# Patient Record
Sex: FEMALE | Race: WHITE | NOT HISPANIC OR LATINO | Employment: UNEMPLOYED | URBAN - METROPOLITAN AREA
[De-identification: names, ages, dates, MRNs, and addresses within clinical notes are randomized per-mention and may not be internally consistent; named-entity substitution may affect disease eponyms.]

---

## 2017-08-08 ENCOUNTER — OFFICE VISIT (OUTPATIENT)
Dept: MEDICAL GROUP | Facility: MEDICAL CENTER | Age: 37
End: 2017-08-08
Payer: COMMERCIAL

## 2017-08-08 VITALS
WEIGHT: 120 LBS | HEIGHT: 65 IN | DIASTOLIC BLOOD PRESSURE: 60 MMHG | HEART RATE: 65 BPM | BODY MASS INDEX: 19.99 KG/M2 | RESPIRATION RATE: 14 BRPM | SYSTOLIC BLOOD PRESSURE: 102 MMHG | TEMPERATURE: 99.8 F | OXYGEN SATURATION: 97 %

## 2017-08-08 DIAGNOSIS — M06.9 RHEUMATOID ARTHRITIS, INVOLVING UNSPECIFIED SITE, UNSPECIFIED RHEUMATOID FACTOR PRESENCE: ICD-10-CM

## 2017-08-08 DIAGNOSIS — Z00.00 PREVENTATIVE HEALTH CARE: ICD-10-CM

## 2017-08-08 PROCEDURE — 99203 OFFICE O/P NEW LOW 30 MIN: CPT | Performed by: PHYSICIAN ASSISTANT

## 2017-08-08 RX ORDER — SULFASALAZINE 500 MG/1
1500 TABLET ORAL 2 TIMES DAILY
Qty: 180 TAB | Refills: 2 | Status: SHIPPED | OUTPATIENT
Start: 2017-08-08 | End: 2017-10-03 | Stop reason: SDUPTHER

## 2017-08-08 ASSESSMENT — PATIENT HEALTH QUESTIONNAIRE - PHQ9: CLINICAL INTERPRETATION OF PHQ2 SCORE: 0

## 2017-08-08 NOTE — Clinical Note
[facility]  Kate Quinones PA-C  [address]  [place] [address]  Fax: [phone]   Authorization for Release/Disclosure of   Protected Health Information   Name: SUNSHINE MILLER : 1980 SSN: [id number]   Address: [facility] [place] [address] Phone:    [phone] (home)    I authorize the entity listed below to release/disclose the PHI below to:   [facility]/Kate Quinones PA-C and Fe Ortiz PA-C   Provider or Entity Name:     Address   City, State, Zip   Phone:      Fax:     Reason for request: continuity of care   Information to be released:    [  ] LAST COLONOSCOPY,  including any PATH REPORT and follow-up  [  ] LAST FIT/COLOGUARD RESULT [  ] LAST DEXA  [  ] LAST MAMMOGRAM  [  ] LAST PAP  [  ] LAST LABS [  ] RETINA EXAM REPORT  [  ] IMMUNIZATION RECORDS  [  ] Release all info      [  ] Check here and initial the line next to each item to release ALL health information INCLUDING  _____ Care and treatment for drug and / or alcohol abuse  _____ HIV testing, infection status, or AIDS  _____ Genetic Testing    DATES OF SERVICE OR TIME PERIOD TO BE DISCLOSED: _____________  I understand and acknowledge that:  * This Authorization may be revoked at any time by you in writing, except if your health information has already been used or disclosed.  * Your health information that will be used or disclosed as a result of you signing this authorization could be re-disclosed by the recipient. If this occurs, your re-disclosed health information may no longer be protected by State or Federal laws.  * You may refuse to sign this Authorization. Your refusal will not affect your ability to obtain treatment.  * This Authorization becomes effective upon signing and will  on (date) __________.      If no date is indicated, this Authorization will  one (1) year from the signature date.    Name: Sunshine Miller    Signature:   Date:     2017       PLEASE FAX REQUESTED RECORDS BACK TO: [phone]  [phone]

## 2017-08-08 NOTE — ASSESSMENT & PLAN NOTE
Pt has known RA, was under care of Dr. Malhotra. She currently takes Tocilizumab and Sulfasalazine. Last labs march 2017, last xrays last yr.   States RA is controlled on current meds,. Joints mostly affected R wrist, neck, collar bone.

## 2017-08-08 NOTE — Clinical Note
[facility]  Kate Quinones PA-C  [address]  [place] [address]  Fax: [phone]   Authorization for Release/Disclosure of   Protected Health Information   Name: SUNSHINE MILLER : 1980 SSN: [id number]   Address: [facility] [place] [address] Phone:    [phone] (home)    I authorize the entity listed below to release/disclose the PHI below to:   [facility]/Kate Quinones PA-C and Fe Ortiz PA-C   Provider or Entity Name:     Address   City, State, Zip   Phone:      Fax:     Reason for request: continuity of care   Information to be released:    [  ] LAST COLONOSCOPY,  including any PATH REPORT and follow-up  [  ] LAST FIT/COLOGUARD RESULT [  ] LAST DEXA  [  ] LAST MAMMOGRAM  [  ] LAST PAP  [  ] LAST LABS [  ] RETINA EXAM REPORT  [  ] IMMUNIZATION RECORDS  [  ] Release all info      [  ] Check here and initial the line next to each item to release ALL health information INCLUDING  _____ Care and treatment for drug and / or alcohol abuse  _____ HIV testing, infection status, or AIDS  _____ Genetic Testing    DATES OF SERVICE OR TIME PERIOD TO BE DISCLOSED: _____________  I understand and acknowledge that:  * This Authorization may be revoked at any time by you in writing, except if your health information has already been used or disclosed.  * Your health information that will be used or disclosed as a result of you signing this authorization could be re-disclosed by the recipient. If this occurs, your re-disclosed health information may no longer be protected by State or Federal laws.  * You may refuse to sign this Authorization. Your refusal will not affect your ability to obtain treatment.  * This Authorization becomes effective upon signing and will  on (date) __________.      If no date is indicated, this Authorization will  one (1) year from the signature date.    Name: Sunshine Miller    Signature:   Date:     2017       PLEASE FAX REQUESTED RECORDS BACK TO: [phone]  [phone]

## 2017-08-08 NOTE — MR AVS SNAPSHOT
"        Sunshine Durony   2017 1:40 PM   Office Visit   MRN: [id number]    Department:  [facility]   Dept Phone:  [phone]    Description:  Female : 1980   Provider:  Fe Ortiz PA-C           Reason for Visit     Establish Care Referrals (Rheumatologist) DX: RA      Allergies as of 2017     No Known Allergies      You were diagnosed with     Rheumatoid arthritis, involving unspecified site, unspecified rheumatoid factor presence (CMS-HCC)   [6221932]       Preventative health care   [172351]         Vital Signs     Blood Pressure Pulse Temperature Respirations Height Weight    102/60 mmHg 65 37.7 °C (99.8 °F) 14 1.651 m (5' 5\") 54.432 kg (120 lb)    Body Mass Index Oxygen Saturation Last Menstrual Period Smoking Status          19.97 kg/m2 97% 2017 Never Smoker         Basic Information     Date Of Birth Sex Race Ethnicity Preferred Language    1980 Female White Non- English      Problem List              ICD-10-CM Priority Class Noted - Resolved    Rheumatoid arthritis (CMS-HCC) M06.9   2014 - Present    Knee mass M25.869   2014 - Present    Hemorrhoid K64.9   2014 - Present      Health Maintenance        Date Due Completion Dates    PAP SMEAR 3/14/2016 3/14/2013 (Prv Comp)    Override on 3/14/2013: Previously completed (per patient, Dr. Sana Aguayo)    IMM INFLUENZA (1) 2017 10/21/2013    IMM DTaP/Tdap/Td Vaccine (2 - Td) 10/21/2023 10/21/2013            Current Immunizations     Influenza Vaccine Quad Inj (Pf) 10/21/2013  7:45 PM    MMR/Varicella Combined Vaccine  Incomplete    Tdap Vaccine  Incomplete, 10/21/2013  5:30 PM      Below and/or attached are the medications your provider expects you to take. Review all of your home medications and newly ordered medications with your provider and/or pharmacist. Follow medication instructions as directed by your provider and/or pharmacist. Please keep your medication list with you and share with " your provider. Update the information when medications are discontinued, doses are changed, or new medications (including over-the-counter products) are added; and carry medication information at all times in the event of emergency situations     Allergies:  No Known Allergies          Medications  Valid as of: August 08, 2017 -  1:59 PM    Generic Name Brand Name Tablet Size Instructions for use    Hydroxychloroquine Sulfate (Tab) PLAQUENIL 200 MG Take 200 mg by mouth every day.        Ibuprofen (Tab) MOTRIN 800 MG Take 1 Tab by mouth every 8 hours as needed for Mild Pain.        Norethindrone Acet-Ethinyl Est (Tab) Norethindrone Acet-Ethinyl Est 1.5-30 MG-MCG Take  by mouth.        Prenatal Vit-Fe Fumarate-FA (Tab) PRENATAL S 27-0.8 MG Take 1 Tab by mouth every morning.        SulfaSALAzine (Tab) AZULFIDINE 500 MG Take 3 Tabs by mouth 2 times a day. Take 3,  500 mg tabs 2 times daily        Tocilizumab (Solution Prefilled Syringe) Tocilizumab 162 MG/0.9ML Inject 1 Each as instructed every 7 days. Indications: Moderate to Severe Rheumatoid Arthritis        .                 Medicines prescribed today were sent to:     [facility] [place], [place] - [address] [facility]    [address] [facility] [place] [address]    Phone: [phone] Fax: [phone]    Open 24 Hours?: No    [facility] - [address]    [address] [facility] [address]    Phone: [phone] Fax: [phone]    Open 24 Hours?: No      Medication refill instructions:       If your prescription bottle indicates you have medication refills left, it is not necessary to call your provider’s office. Please contact your pharmacy and they will refill your medication.    If your prescription bottle indicates you do not have any refills left, you may request refills at any time through one of the following ways: The online [employer] system (except Urgent Care), by calling your provider’s office, or by  asking your pharmacy to contact your provider’s office with a refill request. Medication refills are processed only during regular business hours and may not be available until the next business day. Your provider may request additional information or to have a follow-up visit with you prior to refilling your medication.   *Please Note: Medication refills are assigned a new Rx number when refilled electronically. Your pharmacy may indicate that no refills were authorized even though a new prescription for the same medication is available at the pharmacy. Please request the medicine by name with the pharmacy before contacting your provider for a refill.        Your To Do List     Future Labs/Procedures Complete By Expires    CBC WITH DIFFERENTIAL  As directed 8/9/2018    COMP METABOLIC PANEL  As directed 8/9/2018    LIPID PROFILE  As directed 8/9/2018    TSH WITH REFLEX TO FT4  As directed 8/8/2018    VITAMIN D,25 HYDROXY  As directed 8/8/2018      Referral     A referral request has been sent to our patient care coordination department. Please allow 3-5 business days for us to process this request and contact you either by phone or mail. If you do not hear from us by the 5th business day, please call us at [phone].           [employer] Access Code: Activation code not generated  Current [employer] Status: Active

## 2017-08-30 ENCOUNTER — HOSPITAL ENCOUNTER (OUTPATIENT)
Dept: RADIOLOGY | Facility: MEDICAL CENTER | Age: 37
End: 2017-08-30
Attending: OBSTETRICS & GYNECOLOGY
Payer: COMMERCIAL

## 2017-08-30 DIAGNOSIS — R19.00 PELVIC MASS: ICD-10-CM

## 2017-08-30 PROCEDURE — 74177 CT ABD & PELVIS W/CONTRAST: CPT

## 2017-09-02 ENCOUNTER — HOSPITAL ENCOUNTER (OUTPATIENT)
Dept: LAB | Facility: MEDICAL CENTER | Age: 37
End: 2017-09-02
Attending: PHYSICIAN ASSISTANT
Payer: COMMERCIAL

## 2017-09-02 DIAGNOSIS — Z00.00 PREVENTATIVE HEALTH CARE: ICD-10-CM

## 2017-09-02 LAB
25(OH)D3 SERPL-MCNC: 32 NG/ML (ref 30–100)
ALBUMIN SERPL BCP-MCNC: 4.3 G/DL (ref 3.2–4.9)
ALBUMIN/GLOB SERPL: 2 G/DL
ALP SERPL-CCNC: 37 U/L (ref 30–99)
ALT SERPL-CCNC: 16 U/L (ref 2–50)
ANION GAP SERPL CALC-SCNC: 7 MMOL/L (ref 0–11.9)
AST SERPL-CCNC: 22 U/L (ref 12–45)
BASOPHILS # BLD AUTO: 1 % (ref 0–1.8)
BASOPHILS # BLD: 0.04 K/UL (ref 0–0.12)
BILIRUB SERPL-MCNC: 0.6 MG/DL (ref 0.1–1.5)
BUN SERPL-MCNC: 11 MG/DL (ref 8–22)
CALCIUM SERPL-MCNC: 9.3 MG/DL (ref 8.5–10.5)
CHLORIDE SERPL-SCNC: 106 MMOL/L (ref 96–112)
CHOLEST SERPL-MCNC: 172 MG/DL (ref 100–199)
CO2 SERPL-SCNC: 26 MMOL/L (ref 20–33)
CREAT SERPL-MCNC: 0.79 MG/DL (ref 0.5–1.4)
EOSINOPHIL # BLD AUTO: 0.09 K/UL (ref 0–0.51)
EOSINOPHIL NFR BLD: 2.3 % (ref 0–6.9)
ERYTHROCYTE [DISTWIDTH] IN BLOOD BY AUTOMATED COUNT: 45.6 FL (ref 35.9–50)
FASTING STATUS PATIENT QL REPORTED: NORMAL
GFR SERPL CREATININE-BSD FRML MDRD: >60 ML/MIN/1.73 M 2
GLOBULIN SER CALC-MCNC: 2.2 G/DL (ref 1.9–3.5)
GLUCOSE SERPL-MCNC: 82 MG/DL (ref 65–99)
HCT VFR BLD AUTO: 34.7 % (ref 37–47)
HDLC SERPL-MCNC: 95 MG/DL
HGB BLD-MCNC: 11.1 G/DL (ref 12–16)
IMM GRANULOCYTES # BLD AUTO: 0.01 K/UL (ref 0–0.11)
IMM GRANULOCYTES NFR BLD AUTO: 0.3 % (ref 0–0.9)
LDLC SERPL CALC-MCNC: 66 MG/DL
LYMPHOCYTES # BLD AUTO: 1.56 K/UL (ref 1–4.8)
LYMPHOCYTES NFR BLD: 39.2 % (ref 22–41)
MCH RBC QN AUTO: 30.4 PG (ref 27–33)
MCHC RBC AUTO-ENTMCNC: 32 G/DL (ref 33.6–35)
MCV RBC AUTO: 95.1 FL (ref 81.4–97.8)
MONOCYTES # BLD AUTO: 0.39 K/UL (ref 0–0.85)
MONOCYTES NFR BLD AUTO: 9.8 % (ref 0–13.4)
NEUTROPHILS # BLD AUTO: 1.89 K/UL (ref 2–7.15)
NEUTROPHILS NFR BLD: 47.4 % (ref 44–72)
NRBC # BLD AUTO: 0 K/UL
NRBC BLD AUTO-RTO: 0 /100 WBC
PLATELET # BLD AUTO: 252 K/UL (ref 164–446)
PMV BLD AUTO: 9.9 FL (ref 9–12.9)
POTASSIUM SERPL-SCNC: 4 MMOL/L (ref 3.6–5.5)
PROT SERPL-MCNC: 6.5 G/DL (ref 6–8.2)
RBC # BLD AUTO: 3.65 M/UL (ref 4.2–5.4)
SODIUM SERPL-SCNC: 139 MMOL/L (ref 135–145)
TRIGL SERPL-MCNC: 53 MG/DL (ref 0–149)
TSH SERPL DL<=0.005 MIU/L-ACNC: 1.32 UIU/ML (ref 0.3–3.7)
WBC # BLD AUTO: 4 K/UL (ref 4.8–10.8)

## 2017-09-02 PROCEDURE — 82306 VITAMIN D 25 HYDROXY: CPT

## 2017-09-02 PROCEDURE — 36415 COLL VENOUS BLD VENIPUNCTURE: CPT

## 2017-09-02 PROCEDURE — 85025 COMPLETE CBC W/AUTO DIFF WBC: CPT

## 2017-09-02 PROCEDURE — 80061 LIPID PANEL: CPT

## 2017-09-02 PROCEDURE — 84443 ASSAY THYROID STIM HORMONE: CPT

## 2017-09-02 PROCEDURE — 80053 COMPREHEN METABOLIC PANEL: CPT

## 2017-09-29 DIAGNOSIS — M06.9 RHEUMATOID ARTHRITIS, INVOLVING UNSPECIFIED SITE, UNSPECIFIED RHEUMATOID FACTOR PRESENCE: ICD-10-CM

## 2017-10-05 RX ORDER — TOCILIZUMAB 180 MG/ML
INJECTION, SOLUTION SUBCUTANEOUS
Qty: 3.6 ML | Refills: 1 | Status: SHIPPED | OUTPATIENT
Start: 2017-10-05 | End: 2017-10-25 | Stop reason: SDUPTHER

## 2017-10-05 RX ORDER — SULFASALAZINE 500 MG/1
1500 TABLET ORAL 2 TIMES DAILY
Qty: 180 TAB | Refills: 2 | Status: SHIPPED | OUTPATIENT
Start: 2017-10-05 | End: 2017-10-12 | Stop reason: SDUPTHER

## 2017-10-12 DIAGNOSIS — M06.9 RHEUMATOID ARTHRITIS, INVOLVING UNSPECIFIED SITE, UNSPECIFIED RHEUMATOID FACTOR PRESENCE: ICD-10-CM

## 2017-10-12 RX ORDER — SULFASALAZINE 500 MG/1
1500 TABLET ORAL 2 TIMES DAILY
Qty: 180 TAB | Refills: 2 | Status: SHIPPED | OUTPATIENT
Start: 2017-10-12 | End: 2017-12-19 | Stop reason: SDUPTHER

## 2017-10-25 DIAGNOSIS — M06.9 RHEUMATOID ARTHRITIS, INVOLVING UNSPECIFIED SITE, UNSPECIFIED RHEUMATOID FACTOR PRESENCE: ICD-10-CM

## 2017-12-19 DIAGNOSIS — M06.9 RHEUMATOID ARTHRITIS, INVOLVING UNSPECIFIED SITE, UNSPECIFIED RHEUMATOID FACTOR PRESENCE: ICD-10-CM

## 2017-12-20 RX ORDER — SULFASALAZINE 500 MG/1
1500 TABLET ORAL 2 TIMES DAILY
Qty: 180 TAB | Refills: 0 | Status: SHIPPED | OUTPATIENT
Start: 2017-12-20 | End: 2018-03-30 | Stop reason: SDUPTHER

## 2018-01-28 NOTE — PROGRESS NOTES
Chief Complaint- Rheumatoid arthritis      Sunshine Miller is a 37 y.o. female here as a new Rheumatology Consult referred by Fe Ortiz P.A.-C. for consultation regarding Rheumatoid arthritis    HPI:     Ms. Sunshine Miller presents with a history of rheumatoid arthritis followed by local Rheumatologist, Dr. Malhotra.    Our patient is CCP positive with Right wrist end stage erosive disease with joint space loss ands econdary OA and left IP thumb that has previous trauma.  SHe is RF negative and ANTHONY/SSA/SSB negative.      She was diagnosed with RA in [place].  She presented with  In 2006 she following a miscarriage she started to feel tightness in her neck.  When she became pregnancy again her symptoms resolved.  However following delivery she had recurrence of her symptoms.  She as finally diagnosed in 5/2009.  She did also have swelling in her feet, neck and thumb as well as her feet.  She also has had symptoms in her shoulders.  She denies sicca symptoms.  She has been told she has dry eyes.      When she moved to [place] she was followed by Denise Stewart then Brenna Malhotra    Overall lately she has been well maintained with actemra and sulfasalazine .  She has occasional pain in her wrists.        She has had intermittent rash since 2009 with appearance on her dorsum of the hands and feet that sometimes could turn into a fluid filled lesions and papules.  She manages this with clobetasol.     Previous Medication:  Never been on methotrexate  Tried plaquenil which did not do well  On sulfasalazine since day 1  Humira (didn't work and awful side effects)  remicade  Enbrel (first biologic and after pregnancy when she revisited this it did not work)  Orencia (did not work)  rituxan (did not work)      She notes that there is some mild morning stiffness lasting 10-20 minutes with's a few swollen joints and occasional stiff hands. She sometimes has hair loss (bernadine Humira) and sometimes has photosensitivity but  otherwise denies any eye inflammation or eye pain. She admits to a skin rash but also denies pleuritic chest pain. She admits to a nodes. She has chills and night sweats but no fevers. She is not fatigued as well as headaches as well as cavities and tinnitus. She denies any dizziness fainting or seizures or weakness. She denies any epistaxis or sinus trouble. She admits to night sweats. She denies any heart murmur or nausea or vomiting. She denies any diarrhea. She admits to constipation as well as blood in stool secondary to hemorrhoids. She denies any chest pain or irregular heart rates palpitations shortness of breath. She has regular menstrual periods and her last period was 12/28/2017. She admits to neck size but otherwise no swollen glands or thyroid problems. She drinks 3 glasses of alcohol a week.      Current Medications  tocilizumab 162 mg weekly Q Wednesday  Sulfasalazine 1500 mg BID      Pertinent serologies  Quantiferon negative 8/18/2016  HLA B26 negative  8/18/2016  Uric acid was 30 mg/dL  8/18/2016  SSA negative   8/18/2016  AAB negative 8/18/2016 lab lisa  Rheumatoid arthritis factor 13.7 (0-13.9) 8/18/2016        Results for CIERRA PEREZ (MRN [id number]) as of 1/27/2018 20:26   Ref. Range 1/25/2014 09:41   Hep B Surface Antibody Quant Latest Units: IU/L >1000.00   Hepatitis B Surface Antigen Latest Ref Range: Negative  Negative   Hepatitis B Ccore Ab, Total Latest Ref Range: Negative  Negative     Results for CIERRA PEREZ (MRN [id number]) as of 1/27/2018 20:26   Ref. Range 4/6/2013 11:51   Sjogren'S Anti-Ss-A Latest Ref Range: 0 - 40 AU/mL 1   Sjogren'S Anti-Ss-B Latest Ref Range: 0 - 40 AU/mL 0   Results for CIERRA PEREZ (MRN [id number]) as of 1/27/2018 20:26   Ref. Range 4/6/2013 11:51   Anti-Cariolipin Ab Igg Latest Ref Range: 0 - 14 GPL 3   Anti-Cardiolipin Ab Iga Latest Ref Range: 0 - 11 APL 4   Anti-Cardiolipin Ab Igm Latest Ref Range: 0 - 12 MPL 3   Beta-2 Glycoprotein I Ab Igg Latest Ref  Range: 0 - 20 SGU 7   Beta-2 Glycoprotein I Igm Latest Ref Range: 0 - 20 SMU 4       Single view of both hands 8/18/2016  Severe JSN, subchondral sclerosis and hypertrophic spurring of the radial carpal, intercarpal and carpal metacarpal joints of the right wrist.    Family history: Mother and grandmother had psoriasis, father had Lyme disease, mother had heart disease and colitis and high blood pressure.    Past medical history: Rheumatoid arthritis, lupus, knee lumpectomy.    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     • sulfaSALAzine (AZULFIDINE) 500 MG Tab Take 3 Tabs by mouth 2 times a day. Take 3,  500 mg tabs 2 times daily 180 Tab 0   • Tocilizumab (ACTEMRA) 162 MG/0.9ML Solution Prefilled Syringe Inject 1 Syringe as instructed every 7 days. 1 Syringe 0   • Norethindrone Acet-Ethinyl Est (JUNEL 1.5/30) 1.5-30 MG-MCG TABS Take  by mouth.     • hydroxychloroquine (PLAQUENIL) 200 MG TABS Take 200 mg by mouth every day.     • ibuprofen (MOTRIN) 800 MG TABS Take 1 Tab by mouth every 8 hours as needed for Mild Pain. 60 Tab 3   • prenatal vit/fe fumarate/fa (PRENATAL S) 27-0.8 MG TABS Take 1 Tab by mouth every morning.       No current facility-administered medications for this visit.      She  has a past medical history of Arthritis; Hemorrhoid (2/14/2014); Knee mass (2/14/2014); and Rheumatoid arthritis (CMS-HCC) (2/14/2014).  She  has a past surgical history that includes gyn surgery (2008); gyn surgery (2010); other; and repeat c section (10/21/2013).  Family History   Problem Relation Age of Onset   • Heart Disease Mother    • Hyperlipidemia Mother    • Hypertension Mother    • Cancer Maternal Aunt      esophageal CA, + HPV   • Hypertension Maternal Aunt    • Heart Disease  "Maternal Grandmother    • Heart Attack Maternal Grandmother 77   • Clotting Disorder Maternal Grandmother      DVT   • Cancer Maternal Grandmother      HPV   • Diabetes Maternal Grandfather    • Cancer Maternal Grandfather      Leukemia   • Cancer Paternal Grandmother      breast CA   • Rheumatologic Disease Paternal Grandmother      RA, SLE   • Rheumatologic Disease Other      RA     Family Status   Relation Status   • Mother Alive   • Father Alive   • Sister Alive   • Maternal Aunt Alive   • Maternal Grandmother Alive   • Maternal Grandfather Alive   • Paternal Grandmother Alive   • Paternal Grandfather     navy -- plane shot down   • Other Alive     Social History   Substance Use Topics   • Smoking status: Never Smoker   • Smokeless tobacco: Never Used   • Alcohol use 2.4 oz/week     3 Glasses of wine, 1 Cans of beer per week     Social History     Social History Narrative   • No narrative on file         ROS  Constitutional ROS: chronic night sweats  Eye ROS: No recent significant change in vision  Ear ROS: No ear pain  Mouth/Throat ROS: No recent change in voice or hoarseness  Neck ROS: No lumps or masses  Pulmonary ROS: No dyspnea on exertion, No wheezing, No shortness of breath  Cardiovascular ROS: No chest pain, No shortness of breath, No dyspnea on exertion  Gastrointestinal ROS: No abdominal pain  Musculoskeletal/Extremities ROS: No clubbing, occasional problems with right wrist  Hematologic/Lymphatic ROS: No coagulation disorder  Skin/Integumentary ROS: intermittent  Neurologic ROS: Normal development, No chronic headaches       Objective:     Blood pressure (!) 98/56, pulse 68, temperature 37.6 °C (99.6 °F), resp. rate 14, height 1.651 m (5' 5\"), weight 54.9 kg (121 lb), SpO2 96 %. Body mass index is 20.14 kg/m².  Physical Exam:    Vitals:    18 1048   BP: (!) 98/56   Pulse: 68   Resp: 14   Temp: 37.6 °C (99.6 °F)   SpO2: 96%   Weight: 54.9 kg (121 lb)   Height: 1.651 m (5' 5\")    Body " mass index is 20.14 kg/m².    General/Constitutional: NAD not diaphoretic, comfortable  Eyes: clear conjunctiva, no scleral icterus, EOMI  Ears, Nose, Mouth,Throat: no oral ulcers, good dentition, moist mucous membranes, no discharge from ears bilaterally  Cardiovascular: regular rate and rhythm.  No murmurs, gallops, rubs  Respiratory: normal effort, unlabored respiration.  On auscultation no wheezes, rales, rhonchi.  Clear to auscultation.  GI: soft, NTTP no hepatosplenomegaly, nondistended  Musculoskeletal  Axial:  Cervical:extension 60 degrees flexion 45 degrees rotation to right and left 60 degrees  Thoracic: no kypshosis  Upper Extremities:  No synovitis of the PIP, DIP, MCP however right wrist has severely limited ROM and fused  Elbows have good ROM bilateral.  Left wrist of good ROM bilateral    Lower Extremities:  No knee effusion bilateral, No crepitus bilateral  No MTP tenderness bilateral  Gait is normal  Skin: Limited skin exam.  no rashes, no digital ulcerations, no alopecia, no tophi, no skin thickening, no nodules  Neuro: CN II-XII grossly intact, Alert, Oriented x 3, moves all four extremities  Psych: normal affect, normal mood, judgement appropriate, follows commands, responses are appropritae  Heme/Lymph: no cervical adenopathy        Lab Results   Component Value Date/Time    QNTTBGOLD Comment 01/25/2014 09:41 AM     Lab Results   Component Value Date/Time    HEPBCORTOT Negative 01/25/2014 09:41 AM    HEPBSAG Negative 01/25/2014 09:41 AM     No results found for: HEPCAB  Lab Results   Component Value Date/Time    SODIUM 139 09/02/2017 10:24 AM    POTASSIUM 4.0 09/02/2017 10:24 AM    CHLORIDE 106 09/02/2017 10:24 AM    CO2 26 09/02/2017 10:24 AM    GLUCOSE 82 09/02/2017 10:24 AM    BUN 11 09/02/2017 10:24 AM    CREATININE 0.79 09/02/2017 10:24 AM      Lab Results   Component Value Date/Time    WBC 4.0 (L) 09/02/2017 10:24 AM    RBC 3.65 (L) 09/02/2017 10:24 AM    HEMOGLOBIN 11.1 (L) 09/02/2017  10:24 AM    HEMATOCRIT 34.7 (L) 09/02/2017 10:24 AM    MCV 95.1 09/02/2017 10:24 AM    MCH 30.4 09/02/2017 10:24 AM    MCHC 32.0 (L) 09/02/2017 10:24 AM    MPV 9.9 09/02/2017 10:24 AM    NEUTSPOLYS 47.40 09/02/2017 10:24 AM    LYMPHOCYTES 39.20 09/02/2017 10:24 AM    MONOCYTES 9.80 09/02/2017 10:24 AM    EOSINOPHILS 2.30 09/02/2017 10:24 AM    BASOPHILS 1.00 09/02/2017 10:24 AM    HYPOCHROMIA 1+ 01/25/2014 09:41 AM      Lab Results   Component Value Date/Time    CALCIUM 9.3 09/02/2017 10:24 AM    ASTSGOT 22 09/02/2017 10:24 AM    ALTSGPT 16 09/02/2017 10:24 AM    ALKPHOSPHAT 37 09/02/2017 10:24 AM    TBILIRUBIN 0.6 09/02/2017 10:24 AM    ALBUMIN 4.3 09/02/2017 10:24 AM    TOTPROTEIN 6.5 09/02/2017 10:24 AM     No results found for: URICACID, RHEUMFACTN, CCPANTIBODY, ANTINUCAB  Lab Results   Component Value Date/Time    SEDRATEWES 2 11/13/2014 08:59 AM    CREACTPROT 1.02 (H) 04/06/2013 11:51 AM     Lab Results   Component Value Date/Time    RUSSELVIPER 35.3 04/06/2013 11:51 AM    DRVVTINTERP Not Present 04/06/2013 11:51 AM    DRVVTINTERP Negative 04/06/2013 11:51 AM     Lab Results   Component Value Date/Time    IGGCARDIOLI 3 04/06/2013 11:51 AM    IGMCARDIOLI 3 04/06/2013 11:51 AM    IGACARDIOLI 4 04/06/2013 11:51 AM     No results found for: ANADIRECT, ANTIDNADS, RNPAB, SMITHAB, RZLTUIT13, SSAROAB, SSBLAAB, QVXYNW7RJ, CENTROMBAB  Lab Results   Component Value Date/Time    ANTISSASJ 1 04/06/2013 11:51 AM    ANTISSBSJ 0 04/06/2013 11:51 AM     No results found for: COLORURINE, SPECGRAVITY, PHURINE, GLUCOSEUR, KETONES, PROTEINURIN  No results found for: TOTPROTUR, TOTALVOLUME, [id number]  No results found for: SSA60, SSA52  No results found for: HBA1C  No results found for: CPKTOTAL  No results found for: G6PD  No results found for: [id number]  No results found for: ACESERUM  Lab Results   Component Value Date/Time    25HYDROXY 32 09/02/2017 10:24 AM     No results found for: TSH, FREEDIR  Lab Results   Component  Value Date/Time    EMY 1.320 09/02/2017 10:24 AM     No results found for: MICROSOMALA, ANTITHYROGL  No results found for: IGGLYMEABS  No results found for: ANTIMITOCHO, FACTIN  No results found for: IGA, TTRANSIGA, ENDOIGA  No results found for: FLTYPE, CRYSTALSBDF  No results found for: ISTATICAL  No results found for: ISTATCREAT  No results found for: CTELOPEP  No results found for: GBMABG  No results found for: PTHINTACT           Results for orders placed in visit on 12/12/13   MR-KNEE-W/O    Impression Popliteus myotendinous junction cystic change with edema indicating tendinopathy. A discrete tear is not identified    Large knee joint effusion    Shallow patellar apex cartilage fissuring without full thickness defect, flap or thinning identified    Medial proximal leg superficial mass which had complex cystic characteristics on comparison ultrasound. No significant change in size or morphology is identified given differences in technique. This may represent posttraumatic change, prior hematoma that has not resolved. Myxoid variant soft tissue sarcoma/neoplasm cannot be excluded and if indicated enhanced MRI may prove useful to clarify            Assessment and Plan:    CCP positive rheumatoid arthritis with end stage disease in the right wrist.    She has been well maintained on Actemra 162 mg weekly and sulfasalazine 1500 mg BID.  She has been stable.  Today she inquires about decreasing her medication load. I indicated that I would first like to evaluate and monitor her. If she remains stable we could decrease the sulfasalazine 1000 mg BID.  Alternatively we could also change the Actemra to 162 mg q other week as our second option.    Rash  Will monitor  Currently quiescent  Managed with topicals        Encounter high risk medication  Check lipid panel in March 2, 2018   Check CBC, CMP also in March 2018  Today we will get labs as well and hepatitis C and quantiferon      1. Cyclic citrullinated  peptide (CCP) antibody positive  Probiotic Product (PROBIOTIC DAILY PO)    HEP C VIRUS ANTIBODY    CBC WITH DIFFERENTIAL    COMP METABOLIC PANEL    WESTERGREN SED RATE    CRP QUANTITIVE (NON-CARDIAC)    ANTI-DNA (DS)    ANTHONY ANTIBODY WITH REFLEX   2. Encounter for long-term (current) use of high-risk medication  Probiotic Product (PROBIOTIC DAILY PO)    HEP C VIRUS ANTIBODY    CBC WITH DIFFERENTIAL    COMP METABOLIC PANEL    WESTERGREN SED RATE    CRP QUANTITIVE (NON-CARDIAC)    ANTI-DNA (DS)    ANTHONY ANTIBODY WITH REFLEX    TB TEST CELL IMM MEASURE AG (QUANTIFERON)   3. Rash  Probiotic Product (PROBIOTIC DAILY PO)    HEP C VIRUS ANTIBODY   4. Rheumatoid arthritis of multiple sites with negative rheumatoid factor (CMS-HCC)  Probiotic Product (PROBIOTIC DAILY PO)    HEP C VIRUS ANTIBODY    CBC WITH DIFFERENTIAL    COMP METABOLIC PANEL    WESTERGREN SED RATE    CRP QUANTITIVE (NON-CARDIAC)    ANTI-DNA (DS)    ANTHONY ANTIBODY WITH REFLEX     Return in about 3 months (around 4/30/2018).      Records requested.  Followup: Return in about 3 months (around 4/30/2018). or sooner prn  Patient was seen 60 minutes face-to-face (excluding time for procedures)  of which more than 50% the time was spent counseling the patient regarding  rheumatological conditions and care. Therapy was discussed in detail.  Thank you for this referral.

## 2018-01-31 ENCOUNTER — OFFICE VISIT (OUTPATIENT)
Dept: RHEUMATOLOGY | Facility: PHYSICIAN GROUP | Age: 38
End: 2018-01-31
Payer: COMMERCIAL

## 2018-01-31 VITALS
WEIGHT: 121 LBS | BODY MASS INDEX: 20.16 KG/M2 | TEMPERATURE: 99.6 F | DIASTOLIC BLOOD PRESSURE: 56 MMHG | RESPIRATION RATE: 14 BRPM | HEART RATE: 68 BPM | OXYGEN SATURATION: 96 % | HEIGHT: 65 IN | SYSTOLIC BLOOD PRESSURE: 98 MMHG

## 2018-01-31 DIAGNOSIS — R21 RASH: ICD-10-CM

## 2018-01-31 DIAGNOSIS — M06.09 RHEUMATOID ARTHRITIS OF MULTIPLE SITES WITH NEGATIVE RHEUMATOID FACTOR (HCC): ICD-10-CM

## 2018-01-31 DIAGNOSIS — R76.8 CYCLIC CITRULLINATED PEPTIDE (CCP) ANTIBODY POSITIVE: ICD-10-CM

## 2018-01-31 DIAGNOSIS — Z79.899 ENCOUNTER FOR LONG-TERM (CURRENT) USE OF HIGH-RISK MEDICATION: ICD-10-CM

## 2018-01-31 PROCEDURE — 99244 OFF/OP CNSLTJ NEW/EST MOD 40: CPT | Performed by: INTERNAL MEDICINE

## 2018-01-31 NOTE — LETTER
[facility]   [address], [address]  [place] [address]  Phone: [phone]  Fax: [phone]              Encounter Date: 1/31/2018    Dear Dr. Guy ref. provider found,    It was a pleasure seeing your patient, Sunshine Miller, on 1/31/2018. Diagnoses of Cyclic citrullinated peptide (CCP) antibody positive, Encounter for long-term (current) use of high-risk medication, Rash, and Rheumatoid arthritis of multiple sites with negative rheumatoid factor (CMS-[facility]) were pertinent to this visit.     Please find attached progress note which includes the history I obtained from Ms. Miller, my physical examination findings, my impression and recommendations.      Once again, it was a pleasure participating in your patient's care.  Please feel free to contact me if you have any questions or if I can be of any further assistance to your patients.      Sincerely,    Linda [place] M.D.  Electronically Signed          PROGRESS NOTE:  Chief Complaint- Rheumatoid arthritis      Sunshine Miller is a 37 y.o. female here as a new Rheumatology Consult referred by Fe Ortiz P.A.-C. for consultation regarding Rheumatoid arthritis    HPI:     Ms. Sunshine Miller presents with a history of rheumatoid arthritis followed by local Rheumatologist, Dr. Malhotra.    Our patient is CCP positive with Right wrist end stage erosive disease with joint space loss ands econdary OA and left IP thumb that has previous trauma.  SHe is RF negative and ANTHONY/SSA/SSB negative.      She was diagnosed with RA in [place].  She presented with  In 2006 she following a miscarriage she started to feel tightness in her neck.  When she became pregnancy again her symptoms resolved.  However following delivery she had recurrence of her symptoms.  She as finally diagnosed in 5/2009.  She did also have swelling in her feet, neck and thumb as well as her feet.  She also has had symptoms in her shoulders.  She denies sicca symptoms.  She has  been told she has dry eyes.      When she moved to [place] she was followed by Denise Stewart then Brenna Malhotra    Overall lately she has been well maintained with actemra and sulfasalazine .  She has occasional pain in her wrists.        She has had intermittent rash since 2009 with appearance on her dorsum of the hands and feet that sometimes could turn into a fluid filled lesions and papules.  She manages this with clobetasol.     Previous Medication:  Never been on methotrexate  Tried plaquenil which did not do well  On sulfasalazine since day 1  Humira (didn't work and awful side effects)  remicade  Enbrel (first biologic and after pregnancy when she revisited this it did not work)  Orencia (did not work)  rituxan (did not work)      She notes that there is some mild morning stiffness lasting 10-20 minutes with's a few swollen joints and occasional stiff hands. She sometimes has hair loss (bernadine Humira) and sometimes has photosensitivity but otherwise denies any eye inflammation or eye pain. She admits to a skin rash but also denies pleuritic chest pain. She admits to a nodes. She has chills and night sweats but no fevers. She is not fatigued as well as headaches as well as cavities and tinnitus. She denies any dizziness fainting or seizures or weakness. She denies any epistaxis or sinus trouble. She admits to night sweats. She denies any heart murmur or nausea or vomiting. She denies any diarrhea. She admits to constipation as well as blood in stool secondary to hemorrhoids. She denies any chest pain or irregular heart rates palpitations shortness of breath. She has regular menstrual periods and her last period was 12/28/2017. She admits to neck size but otherwise no swollen glands or thyroid problems. She drinks 3 glasses of alcohol a week.      Current Medications  tocilizumab 162 mg weekly Q Wednesday  Sulfasalazine 1500 mg BID      Pertinent serologies  Quantiferon negative 8/18/2016  HLA B26 negative   8/18/2016  Uric acid was 30 mg/dL  8/18/2016  SSA negative   8/18/2016  AAB negative 8/18/2016 lab lisa  Rheumatoid arthritis factor 13.7 (0-13.9) 8/18/2016        Results for CIERRA PEREZ (MRN [id number]) as of 1/27/2018 20:26   Ref. Range 1/25/2014 09:41   Hep B Surface Antibody Quant Latest Units: IU/L >1000.00   Hepatitis B Surface Antigen Latest Ref Range: Negative  Negative   Hepatitis B Ccore Ab, Total Latest Ref Range: Negative  Negative     Results for CIERRA PEREZ (MRN [id number]) as of 1/27/2018 20:26   Ref. Range 4/6/2013 11:51   Sjogren'S Anti-Ss-A Latest Ref Range: 0 - 40 AU/mL 1   Sjogren'S Anti-Ss-B Latest Ref Range: 0 - 40 AU/mL 0   Results for CIERRA PEREZ (MRN [id number]) as of 1/27/2018 20:26   Ref. Range 4/6/2013 11:51   Anti-Cariolipin Ab Igg Latest Ref Range: 0 - 14 GPL 3   Anti-Cardiolipin Ab Iga Latest Ref Range: 0 - 11 APL 4   Anti-Cardiolipin Ab Igm Latest Ref Range: 0 - 12 MPL 3   Beta-2 Glycoprotein I Ab Igg Latest Ref Range: 0 - 20 SGU 7   Beta-2 Glycoprotein I Igm Latest Ref Range: 0 - 20 SMU 4       Single view of both hands 8/18/2016  Severe JSN, subchondral sclerosis and hypertrophic spurring of the radial carpal, intercarpal and carpal metacarpal joints of the right wrist.    Family history: Mother and grandmother had psoriasis, father had Lyme disease, mother had heart disease and colitis and high blood pressure.    Past medical history: Rheumatoid arthritis, lupus, knee lumpectomy.    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     • sulfaSALAzine (AZULFIDINE) 500 MG Tab Take 3 Tabs by mouth 2 times a day. Take 3,  500 mg tabs 2 times daily 180 Tab 0   • Tocilizumab (ACTEMRA) 162 MG/0.9ML Solution Prefilled Syringe Inject 1 Syringe as instructed every 7  days. 1 Syringe 0   • Norethindrone Acet-Ethinyl Est (JUNEL ) 1.5-30 MG-MCG TABS Take  by mouth.     • hydroxychloroquine (PLAQUENIL) 200 MG TABS Take 200 mg by mouth every day.     • ibuprofen (MOTRIN) 800 MG TABS Take 1 Tab by mouth every 8 hours as needed for Mild Pain. 60 Tab 3   • prenatal vit/fe fumarate/fa (PRENATAL S) 27-0.8 MG TABS Take 1 Tab by mouth every morning.       No current facility-administered medications for this visit.      She  has a past medical history of Arthritis; Hemorrhoid (2014); Knee mass (2014); and Rheumatoid arthritis (CMS-HCC) (2014).  She  has a past surgical history that includes gyn surgery (); gyn surgery (); other; and repeat c section (10/21/2013).  Family History   Problem Relation Age of Onset   • Heart Disease Mother    • Hyperlipidemia Mother    • Hypertension Mother    • Cancer Maternal Aunt      esophageal CA, + HPV   • Hypertension Maternal Aunt    • Heart Disease Maternal Grandmother    • Heart Attack Maternal Grandmother 77   • Clotting Disorder Maternal Grandmother      DVT   • Cancer Maternal Grandmother      HPV   • Diabetes Maternal Grandfather    • Cancer Maternal Grandfather      Leukemia   • Cancer Paternal Grandmother      breast CA   • Rheumatologic Disease Paternal Grandmother      RA, SLE   • Rheumatologic Disease Other      RA     Family Status   Relation Status   • Mother Alive   • Father Alive   • Sister Alive   • Maternal Aunt Alive   • Maternal Grandmother Alive   • Maternal Grandfather Alive   • Paternal Grandmother Alive   • Paternal Grandfather     navy -- plane shot down   • Other Alive     Social History   Substance Use Topics   • Smoking status: Never Smoker   • Smokeless tobacco: Never Used   • Alcohol use 2.4 oz/week     3 Glasses of wine, 1 Cans of beer per week     Social History     Social History Narrative   • No narrative on file         ROS  Constitutional ROS: chronic night sweats  Eye ROS: No  "recent significant change in vision  Ear ROS: No ear pain  Mouth/Throat ROS: No recent change in voice or hoarseness  Neck ROS: No lumps or masses  Pulmonary ROS: No dyspnea on exertion, No wheezing, No shortness of breath  Cardiovascular ROS: No chest pain, No shortness of breath, No dyspnea on exertion  Gastrointestinal ROS: No abdominal pain  Musculoskeletal/Extremities ROS: No clubbing, occasional problems with right wrist  Hematologic/Lymphatic ROS: No coagulation disorder  Skin/Integumentary ROS: intermittent  Neurologic ROS: Normal development, No chronic headaches       Objective:     Blood pressure (!) 98/56, pulse 68, temperature 37.6 °C (99.6 °F), resp. rate 14, height 1.651 m (5' 5\"), weight 54.9 kg (121 lb), SpO2 96 %. Body mass index is 20.14 kg/m².  Physical Exam:    Vitals:    01/31/18 1048   BP: (!) 98/56   Pulse: 68   Resp: 14   Temp: 37.6 °C (99.6 °F)   SpO2: 96%   Weight: 54.9 kg (121 lb)   Height: 1.651 m (5' 5\")    Body mass index is 20.14 kg/m².    General/Constitutional: NAD not diaphoretic, comfortable  Eyes: clear conjunctiva, no scleral icterus, EOMI  Ears, Nose, Mouth,Throat: no oral ulcers, good dentition, moist mucous membranes, no discharge from ears bilaterally  Cardiovascular: regular rate and rhythm.  No murmurs, gallops, rubs  Respiratory: normal effort, unlabored respiration.  On auscultation no wheezes, rales, rhonchi.  Clear to auscultation.  GI: soft, NTTP no hepatosplenomegaly, nondistended  Musculoskeletal  Axial:  Cervical:extension 60 degrees flexion 45 degrees rotation to right and left 60 degrees  Thoracic: no kypshosis  Upper Extremities:  No synovitis of the PIP, DIP, MCP however right wrist has severely limited ROM and fused  Elbows have good ROM bilateral.  Left wrist of good ROM bilateral    Lower Extremities:  No knee effusion bilateral, No crepitus bilateral  No MTP tenderness bilateral  Gait is normal  Skin: Limited skin exam.  no rashes, no digital ulcerations, " no alopecia, no tophi, no skin thickening, no nodules  Neuro: CN II-XII grossly intact, Alert, Oriented x 3, moves all four extremities  Psych: normal affect, normal mood, judgement appropriate, follows commands, responses are appropritae  Heme/Lymph: no cervical adenopathy        Lab Results   Component Value Date/Time    QNTTBGOLD Comment 01/25/2014 09:41 AM     Lab Results   Component Value Date/Time    HEPBCORTOT Negative 01/25/2014 09:41 AM    HEPBSAG Negative 01/25/2014 09:41 AM     No results found for: HEPCAB  Lab Results   Component Value Date/Time    SODIUM 139 09/02/2017 10:24 AM    POTASSIUM 4.0 09/02/2017 10:24 AM    CHLORIDE 106 09/02/2017 10:24 AM    CO2 26 09/02/2017 10:24 AM    GLUCOSE 82 09/02/2017 10:24 AM    BUN 11 09/02/2017 10:24 AM    CREATININE 0.79 09/02/2017 10:24 AM      Lab Results   Component Value Date/Time    WBC 4.0 (L) 09/02/2017 10:24 AM    RBC 3.65 (L) 09/02/2017 10:24 AM    HEMOGLOBIN 11.1 (L) 09/02/2017 10:24 AM    HEMATOCRIT 34.7 (L) 09/02/2017 10:24 AM    MCV 95.1 09/02/2017 10:24 AM    MCH 30.4 09/02/2017 10:24 AM    MCHC 32.0 (L) 09/02/2017 10:24 AM    MPV 9.9 09/02/2017 10:24 AM    NEUTSPOLYS 47.40 09/02/2017 10:24 AM    LYMPHOCYTES 39.20 09/02/2017 10:24 AM    MONOCYTES 9.80 09/02/2017 10:24 AM    EOSINOPHILS 2.30 09/02/2017 10:24 AM    BASOPHILS 1.00 09/02/2017 10:24 AM    HYPOCHROMIA 1+ 01/25/2014 09:41 AM      Lab Results   Component Value Date/Time    CALCIUM 9.3 09/02/2017 10:24 AM    ASTSGOT 22 09/02/2017 10:24 AM    ALTSGPT 16 09/02/2017 10:24 AM    ALKPHOSPHAT 37 09/02/2017 10:24 AM    TBILIRUBIN 0.6 09/02/2017 10:24 AM    ALBUMIN 4.3 09/02/2017 10:24 AM    TOTPROTEIN 6.5 09/02/2017 10:24 AM     No results found for: URICACID, RHEUMFACTN, CCPANTIBODY, ANTINUCAB  Lab Results   Component Value Date/Time    SEDRATEWES 2 11/13/2014 08:59 AM    CREACTPROT 1.02 (H) 04/06/2013 11:51 AM     Lab Results   Component Value Date/Time    VIVIAN 35.3 04/06/2013 11:51 AM     DRVVTINTERP Not Present 04/06/2013 11:51 AM    DRVVTINTERP Negative 04/06/2013 11:51 AM     Lab Results   Component Value Date/Time    IGGCARDIOLI 3 04/06/2013 11:51 AM    IGMCARDIOLI 3 04/06/2013 11:51 AM    IGACARDIOLI 4 04/06/2013 11:51 AM     No results found for: ANADIRECT, ANTIDNADS, RNPAB, SMITHAB, VQKCVMW35, SSAROAB, SSBLAAB, ZPPFXH9LU, CENTROMBAB  Lab Results   Component Value Date/Time    ANTISSASJ 1 04/06/2013 11:51 AM    ANTISSBSJ 0 04/06/2013 11:51 AM     No results found for: COLORURINE, SPECGRAVITY, PHURINE, GLUCOSEUR, KETONES, PROTEINURIN  No results found for: TOTPROTUR, TOTALVOLUME, IYSQUQCD18  No results found for: SSA60, SSA52  No results found for: HBA1C  No results found for: CPKTOTAL  No results found for: G6PD  No results found for: [id number]  No results found for: ACESERUM  Lab Results   Component Value Date/Time    25HYDROXY 32 09/02/2017 10:24 AM     No results found for: TSH, FREEDIR  Lab Results   Component Value Date/Time    TSHULTRASEN 1.320 09/02/2017 10:24 AM     No results found for: MICROSOMALA, ANTITHYROGL  No results found for: IGGLYMEABS  No results found for: ANTIMITOCHO, FACTIN  No results found for: IGA, TTRANSIGA, ENDOIGA  No results found for: FLTYPE, CRYSTALSBDF  No results found for: ISTATICAL  No results found for: ISTATCREAT  No results found for: CTELOPEP  No results found for: GBMABG  No results found for: PTHINTACT           Results for orders placed in visit on 12/12/13   MR-KNEE-W/O    Impression Popliteus myotendinous junction cystic change with edema indicating tendinopathy. A discrete tear is not identified    Large knee joint effusion    Shallow patellar apex cartilage fissuring without full thickness defect, flap or thinning identified    Medial proximal leg superficial mass which had complex cystic characteristics on comparison ultrasound. No significant change in size or morphology is identified given differences in technique. This may represent  posttraumatic change, prior hematoma that has not resolved. Myxoid variant soft tissue sarcoma/neoplasm cannot be excluded and if indicated enhanced MRI may prove useful to clarify            Assessment and Plan:    CCP positive rheumatoid arthritis with end stage disease in the right wrist.    She has been well maintained on Actemra 162 mg weekly and sulfasalazine 1500 mg BID.  She has been stable.  Today she inquires about decreasing her medication load. I indicated that I would first like to evaluate and monitor her. If she remains stable we could decrease the sulfasalazine 1000 mg BID.  Alternatively we could also change the Actemra to 162 mg q other week as our second option.    Rash  Will monitor  Currently quiescent  Managed with topicals        Encounter high risk medication  Check lipid panel in March 2, 2018   Check CBC, CMP also in March 2018  Today we will get labs as well and hepatitis C and quantiferon      1. Cyclic citrullinated peptide (CCP) antibody positive  Probiotic Product (PROBIOTIC DAILY PO)    HEP C VIRUS ANTIBODY    CBC WITH DIFFERENTIAL    COMP METABOLIC PANEL    WESTERGREN SED RATE    CRP QUANTITIVE (NON-CARDIAC)    ANTI-DNA (DS)    ANTHONY ANTIBODY WITH REFLEX   2. Encounter for long-term (current) use of high-risk medication  Probiotic Product (PROBIOTIC DAILY PO)    HEP C VIRUS ANTIBODY    CBC WITH DIFFERENTIAL    COMP METABOLIC PANEL    WESTERGREN SED RATE    CRP QUANTITIVE (NON-CARDIAC)    ANTI-DNA (DS)    ANTHONY ANTIBODY WITH REFLEX    TB TEST CELL IMM MEASURE AG (QUANTIFERON)   3. Rash  Probiotic Product (PROBIOTIC DAILY PO)    HEP C VIRUS ANTIBODY   4. Rheumatoid arthritis of multiple sites with negative rheumatoid factor (CMS-[facility])  Probiotic Product (PROBIOTIC DAILY PO)    HEP C VIRUS ANTIBODY    CBC WITH DIFFERENTIAL    COMP METABOLIC PANEL    WESTERGREN SED RATE    CRP QUANTITIVE (NON-CARDIAC)    ANTI-DNA (DS)    ANTHONY ANTIBODY WITH REFLEX     Return in about 3 months (around  4/30/2018).      Records requested.  Followup: Return in about 3 months (around 4/30/2018). or sooner prn  Patient was seen 60 minutes face-to-face (excluding time for procedures)  of which more than 50% the time was spent counseling the patient regarding  rheumatological conditions and care. Therapy was discussed in detail.  Thank you for this referral.

## 2018-02-05 ENCOUNTER — HOSPITAL ENCOUNTER (OUTPATIENT)
Dept: LAB | Facility: MEDICAL CENTER | Age: 38
End: 2018-02-05
Attending: INTERNAL MEDICINE
Payer: COMMERCIAL

## 2018-02-05 DIAGNOSIS — M06.09 RHEUMATOID ARTHRITIS OF MULTIPLE SITES WITH NEGATIVE RHEUMATOID FACTOR (HCC): ICD-10-CM

## 2018-02-05 DIAGNOSIS — R76.8 CYCLIC CITRULLINATED PEPTIDE (CCP) ANTIBODY POSITIVE: ICD-10-CM

## 2018-02-05 DIAGNOSIS — Z79.899 ENCOUNTER FOR LONG-TERM (CURRENT) USE OF HIGH-RISK MEDICATION: ICD-10-CM

## 2018-02-05 DIAGNOSIS — R21 RASH: ICD-10-CM

## 2018-02-05 LAB
ALBUMIN SERPL BCP-MCNC: 4.5 G/DL (ref 3.2–4.9)
ALBUMIN/GLOB SERPL: 2.4 G/DL
ALP SERPL-CCNC: 36 U/L (ref 30–99)
ALT SERPL-CCNC: 84 U/L (ref 2–50)
ANION GAP SERPL CALC-SCNC: 9 MMOL/L (ref 0–11.9)
AST SERPL-CCNC: 187 U/L (ref 12–45)
BASOPHILS # BLD AUTO: 0.8 % (ref 0–1.8)
BASOPHILS # BLD: 0.05 K/UL (ref 0–0.12)
BILIRUB SERPL-MCNC: 0.5 MG/DL (ref 0.1–1.5)
BUN SERPL-MCNC: 14 MG/DL (ref 8–22)
CALCIUM SERPL-MCNC: 9.1 MG/DL (ref 8.5–10.5)
CHLORIDE SERPL-SCNC: 105 MMOL/L (ref 96–112)
CO2 SERPL-SCNC: 24 MMOL/L (ref 20–33)
CREAT SERPL-MCNC: 0.73 MG/DL (ref 0.5–1.4)
CRP SERPL HS-MCNC: 0.02 MG/DL (ref 0–0.75)
EOSINOPHIL # BLD AUTO: 0.17 K/UL (ref 0–0.51)
EOSINOPHIL NFR BLD: 2.9 % (ref 0–6.9)
ERYTHROCYTE [DISTWIDTH] IN BLOOD BY AUTOMATED COUNT: 47.6 FL (ref 35.9–50)
GLOBULIN SER CALC-MCNC: 1.9 G/DL (ref 1.9–3.5)
GLUCOSE SERPL-MCNC: 97 MG/DL (ref 65–99)
HCT VFR BLD AUTO: 33.2 % (ref 37–47)
HCV AB SER QL: NEGATIVE
HGB BLD-MCNC: 10.9 G/DL (ref 12–16)
IMM GRANULOCYTES # BLD AUTO: 0.02 K/UL (ref 0–0.11)
IMM GRANULOCYTES NFR BLD AUTO: 0.3 % (ref 0–0.9)
LYMPHOCYTES # BLD AUTO: 2.22 K/UL (ref 1–4.8)
LYMPHOCYTES NFR BLD: 37.4 % (ref 22–41)
MCH RBC QN AUTO: 31.8 PG (ref 27–33)
MCHC RBC AUTO-ENTMCNC: 32.8 G/DL (ref 33.6–35)
MCV RBC AUTO: 96.8 FL (ref 81.4–97.8)
MONOCYTES # BLD AUTO: 0.45 K/UL (ref 0–0.85)
MONOCYTES NFR BLD AUTO: 7.6 % (ref 0–13.4)
NEUTROPHILS # BLD AUTO: 3.02 K/UL (ref 2–7.15)
NEUTROPHILS NFR BLD: 51 % (ref 44–72)
NRBC # BLD AUTO: 0 K/UL
NRBC BLD-RTO: 0 /100 WBC
PLATELET # BLD AUTO: 269 K/UL (ref 164–446)
PMV BLD AUTO: 10 FL (ref 9–12.9)
POTASSIUM SERPL-SCNC: 4 MMOL/L (ref 3.6–5.5)
PROT SERPL-MCNC: 6.4 G/DL (ref 6–8.2)
RBC # BLD AUTO: 3.43 M/UL (ref 4.2–5.4)
SODIUM SERPL-SCNC: 138 MMOL/L (ref 135–145)
WBC # BLD AUTO: 5.9 K/UL (ref 4.8–10.8)

## 2018-02-05 PROCEDURE — 86225 DNA ANTIBODY NATIVE: CPT | Mod: 91

## 2018-02-05 PROCEDURE — 85025 COMPLETE CBC W/AUTO DIFF WBC: CPT

## 2018-02-05 PROCEDURE — 86235 NUCLEAR ANTIGEN ANTIBODY: CPT | Mod: 91

## 2018-02-05 PROCEDURE — 85652 RBC SED RATE AUTOMATED: CPT

## 2018-02-05 PROCEDURE — 36415 COLL VENOUS BLD VENIPUNCTURE: CPT

## 2018-02-05 PROCEDURE — 86038 ANTINUCLEAR ANTIBODIES: CPT

## 2018-02-05 PROCEDURE — 86140 C-REACTIVE PROTEIN: CPT

## 2018-02-05 PROCEDURE — 86480 TB TEST CELL IMMUN MEASURE: CPT

## 2018-02-05 PROCEDURE — 80053 COMPREHEN METABOLIC PANEL: CPT

## 2018-02-05 PROCEDURE — 86803 HEPATITIS C AB TEST: CPT

## 2018-02-06 LAB — ERYTHROCYTE [SEDIMENTATION RATE] IN BLOOD BY WESTERGREN METHOD: 1 MM/HOUR (ref 0–20)

## 2018-02-07 LAB
DSDNA AB TITR SER CLIF: NORMAL {TITER}
M TB TUBERC IFN-G BLD QL: NEGATIVE
M TB TUBERC IFN-G/MITOGEN IGNF BLD: 0.03
M TB TUBERC IGNF/MITOGEN IGNF CONTROL: 41.81 [IU]/ML
MITOGEN IGNF BCKGRD COR BLD-ACNC: 0.02 [IU]/ML
NUCLEAR IGG SER QL IA: NORMAL

## 2018-02-12 ENCOUNTER — TELEPHONE (OUTPATIENT)
Dept: RHEUMATOLOGY | Facility: PHYSICIAN GROUP | Age: 38
End: 2018-02-12

## 2018-02-12 DIAGNOSIS — R74.01 TRANSAMINITIS: ICD-10-CM

## 2018-02-12 NOTE — TELEPHONE ENCOUNTER
Please call patient    The liver enzymes are elevated.  I would like you to avoid any ibuprofen, tylenol/acetaminophen and alcohol.  I would also like for you to hold the actemra.  Please recheck liver enzymes in two weeks.    If they remain elevated we will decrease the sulfasalazine.

## 2018-02-26 ENCOUNTER — HOSPITAL ENCOUNTER (OUTPATIENT)
Dept: LAB | Facility: MEDICAL CENTER | Age: 38
End: 2018-02-26
Attending: INTERNAL MEDICINE
Payer: COMMERCIAL

## 2018-02-26 DIAGNOSIS — R74.01 TRANSAMINITIS: ICD-10-CM

## 2018-02-26 LAB
ALBUMIN SERPL BCP-MCNC: 4.6 G/DL (ref 3.2–4.9)
ALP SERPL-CCNC: 37 U/L (ref 30–99)
ALT SERPL-CCNC: 17 U/L (ref 2–50)
AST SERPL-CCNC: 21 U/L (ref 12–45)
BILIRUB CONJ SERPL-MCNC: <0.1 MG/DL (ref 0.1–0.5)
BILIRUB INDIRECT SERPL-MCNC: NORMAL MG/DL (ref 0–1)
BILIRUB SERPL-MCNC: 0.4 MG/DL (ref 0.1–1.5)
CK SERPL-CCNC: 133 U/L (ref 0–154)
PROT SERPL-MCNC: 6.6 G/DL (ref 6–8.2)

## 2018-02-26 PROCEDURE — 80076 HEPATIC FUNCTION PANEL: CPT

## 2018-02-26 PROCEDURE — 82085 ASSAY OF ALDOLASE: CPT

## 2018-02-26 PROCEDURE — 36415 COLL VENOUS BLD VENIPUNCTURE: CPT

## 2018-02-26 PROCEDURE — 82550 ASSAY OF CK (CPK): CPT

## 2018-02-28 LAB — ALDOLASE SERPL-CCNC: 1.8 U/L (ref 1.5–8.1)

## 2018-03-01 ENCOUNTER — TELEPHONE (OUTPATIENT)
Dept: RHEUMATOLOGY | Facility: PHYSICIAN GROUP | Age: 38
End: 2018-03-01

## 2018-03-01 DIAGNOSIS — Z79.899 ENCOUNTER FOR LONG-TERM (CURRENT) USE OF HIGH-RISK MEDICATION: ICD-10-CM

## 2018-03-02 NOTE — TELEPHONE ENCOUNTER
1. Caller Name: Sunshine Miller                                         Call Back Number: [phone] (home)       Patient approves a detailed voicemail message: N\A        Pt called stating she did her labs and wants to know the results she also. Wants to know what medication she should take please advised

## 2018-03-02 NOTE — TELEPHONE ENCOUNTER
Off biologic for 3 weeks.  Can restart.    Ok to decrease sulfasalazine to 1000 mg BID  Ok to restart biologic  But recheck labs CBC and CMP in 4 weeks.  She was previously on actemra q other week but was uncontrolled so this was increased to q week.  Will keep to q week.  Patient does admit to drinking that weekend before labs.

## 2018-03-30 ENCOUNTER — HOSPITAL ENCOUNTER (OUTPATIENT)
Dept: LAB | Facility: MEDICAL CENTER | Age: 38
End: 2018-03-30
Attending: INTERNAL MEDICINE
Payer: COMMERCIAL

## 2018-03-30 DIAGNOSIS — Z79.899 ENCOUNTER FOR LONG-TERM (CURRENT) USE OF HIGH-RISK MEDICATION: ICD-10-CM

## 2018-03-30 DIAGNOSIS — M06.9 RHEUMATOID ARTHRITIS, INVOLVING UNSPECIFIED SITE, UNSPECIFIED RHEUMATOID FACTOR PRESENCE: ICD-10-CM

## 2018-03-30 LAB
ALBUMIN SERPL BCP-MCNC: 4.2 G/DL (ref 3.2–4.9)
ALBUMIN/GLOB SERPL: 2 G/DL
ALP SERPL-CCNC: 34 U/L (ref 30–99)
ALT SERPL-CCNC: 21 U/L (ref 2–50)
ANION GAP SERPL CALC-SCNC: 10 MMOL/L (ref 0–11.9)
AST SERPL-CCNC: 22 U/L (ref 12–45)
BASOPHILS # BLD AUTO: 0.9 % (ref 0–1.8)
BASOPHILS # BLD: 0.04 K/UL (ref 0–0.12)
BILIRUB SERPL-MCNC: 0.5 MG/DL (ref 0.1–1.5)
BUN SERPL-MCNC: 14 MG/DL (ref 8–22)
CALCIUM SERPL-MCNC: 9.2 MG/DL (ref 8.5–10.5)
CHLORIDE SERPL-SCNC: 106 MMOL/L (ref 96–112)
CO2 SERPL-SCNC: 23 MMOL/L (ref 20–33)
CREAT SERPL-MCNC: 0.86 MG/DL (ref 0.5–1.4)
EOSINOPHIL # BLD AUTO: 0.06 K/UL (ref 0–0.51)
EOSINOPHIL NFR BLD: 1.3 % (ref 0–6.9)
ERYTHROCYTE [DISTWIDTH] IN BLOOD BY AUTOMATED COUNT: 44.1 FL (ref 35.9–50)
GLOBULIN SER CALC-MCNC: 2.1 G/DL (ref 1.9–3.5)
GLUCOSE SERPL-MCNC: 88 MG/DL (ref 65–99)
HCT VFR BLD AUTO: 33 % (ref 37–47)
HGB BLD-MCNC: 10.4 G/DL (ref 12–16)
IMM GRANULOCYTES # BLD AUTO: 0.02 K/UL (ref 0–0.11)
IMM GRANULOCYTES NFR BLD AUTO: 0.4 % (ref 0–0.9)
LYMPHOCYTES # BLD AUTO: 0.97 K/UL (ref 1–4.8)
LYMPHOCYTES NFR BLD: 20.6 % (ref 22–41)
MCH RBC QN AUTO: 28.7 PG (ref 27–33)
MCHC RBC AUTO-ENTMCNC: 31.5 G/DL (ref 33.6–35)
MCV RBC AUTO: 91.2 FL (ref 81.4–97.8)
MONOCYTES # BLD AUTO: 0.36 K/UL (ref 0–0.85)
MONOCYTES NFR BLD AUTO: 7.7 % (ref 0–13.4)
NEUTROPHILS # BLD AUTO: 3.25 K/UL (ref 2–7.15)
NEUTROPHILS NFR BLD: 69.1 % (ref 44–72)
NRBC # BLD AUTO: 0 K/UL
NRBC BLD-RTO: 0 /100 WBC
PLATELET # BLD AUTO: 303 K/UL (ref 164–446)
PMV BLD AUTO: 9.8 FL (ref 9–12.9)
POTASSIUM SERPL-SCNC: 4.2 MMOL/L (ref 3.6–5.5)
PROT SERPL-MCNC: 6.3 G/DL (ref 6–8.2)
RBC # BLD AUTO: 3.62 M/UL (ref 4.2–5.4)
SODIUM SERPL-SCNC: 139 MMOL/L (ref 135–145)
WBC # BLD AUTO: 4.7 K/UL (ref 4.8–10.8)

## 2018-03-30 PROCEDURE — 85025 COMPLETE CBC W/AUTO DIFF WBC: CPT

## 2018-03-30 PROCEDURE — 36415 COLL VENOUS BLD VENIPUNCTURE: CPT

## 2018-03-30 PROCEDURE — 80053 COMPREHEN METABOLIC PANEL: CPT

## 2018-03-30 RX ORDER — SULFASALAZINE 500 MG/1
1500 TABLET ORAL 2 TIMES DAILY
Qty: 180 TAB | Refills: 0 | Status: SHIPPED | OUTPATIENT
Start: 2018-03-30 | End: 2018-05-01 | Stop reason: SDUPTHER

## 2018-05-01 ENCOUNTER — OFFICE VISIT (OUTPATIENT)
Dept: RHEUMATOLOGY | Facility: PHYSICIAN GROUP | Age: 38
End: 2018-05-01
Payer: COMMERCIAL

## 2018-05-01 VITALS
HEART RATE: 66 BPM | OXYGEN SATURATION: 100 % | RESPIRATION RATE: 12 BRPM | BODY MASS INDEX: 20.93 KG/M2 | SYSTOLIC BLOOD PRESSURE: 80 MMHG | WEIGHT: 125.8 LBS | TEMPERATURE: 98.8 F | DIASTOLIC BLOOD PRESSURE: 60 MMHG

## 2018-05-01 DIAGNOSIS — Z79.899 ENCOUNTER FOR LONG-TERM (CURRENT) USE OF HIGH-RISK MEDICATION: ICD-10-CM

## 2018-05-01 DIAGNOSIS — K64.9 HEMORRHOIDS, UNSPECIFIED HEMORRHOID TYPE: ICD-10-CM

## 2018-05-01 DIAGNOSIS — M06.09 RHEUMATOID ARTHRITIS OF MULTIPLE SITES WITH NEGATIVE RHEUMATOID FACTOR (HCC): ICD-10-CM

## 2018-05-01 DIAGNOSIS — M06.9 RHEUMATOID ARTHRITIS, INVOLVING UNSPECIFIED SITE, UNSPECIFIED RHEUMATOID FACTOR PRESENCE: ICD-10-CM

## 2018-05-01 PROCEDURE — 99214 OFFICE O/P EST MOD 30 MIN: CPT | Performed by: INTERNAL MEDICINE

## 2018-05-01 RX ORDER — SULFASALAZINE 500 MG/1
1000 TABLET ORAL 2 TIMES DAILY
Qty: 120 TAB | Refills: 3 | Status: SHIPPED | OUTPATIENT
Start: 2018-05-01 | End: 2018-05-17

## 2018-05-01 ASSESSMENT — PAIN SCALES - GENERAL: PAINLEVEL: 3=SLIGHT PAIN

## 2018-05-01 ASSESSMENT — ENCOUNTER SYMPTOMS
FEVER: 0
CHILLS: 0
HEARTBURN: 0
CONSTIPATION: 1

## 2018-05-01 NOTE — PROGRESS NOTES
Established patient    Ms. Sunshine Miller is here for follow-up forCCP positive Rheumatoid Arthritis.    CCP positive rheumatoid Arthritis  Since last visit she had increasing right wrist pain after falling and slipping.  She otherwise denies any joint pain.  She has been working out lately.  We did decrease the sulfasalazine with no difference.  Her other joints do not have any new swelling or stiffness.  She has received cortisone injections in the past with poor response.    Current Medications  tocilizumab 162 mg weekly Q Wednesday  Sulfasalazine 1000 mg BID    Rash  persistent  Managed with clobetasol  Describes as papular and will get raised and blister with gluid    Elevated LFT  Resolved   Does admit she was drikning at the time    Hemorrhoids and chronic constipation  Last fall colon was inflamed  Since her colonoscopy she has had chronic constipation  She has a history of hemorrhoids and it flares once a month with increase bleeding.    RHEUM HISTORY  Ms. Sunshine Miller presents with a history of rheumatoid arthritis followed by local Rheumatologist, Dr. Malhotra.     Our patient is CCP positive with Right wrist end stage erosive disease with joint space loss ands econdary OA and left IP thumb that has previous trauma.  SHe is RF negative and ANTHONY/SSA/SSB negative.       She was diagnosed with RA in [place].  She presented with  In 2006 she following a miscarriage she started to feel tightness in her neck.  When she became pregnancy again her symptoms resolved.  However following delivery she had recurrence of her symptoms.  She as finally diagnosed in 5/2009.  She did also have swelling in her feet, neck and thumb as well as her feet.  She also has had symptoms in her shoulders.  She denies sicca symptoms.  She has been told she has dry eyes.       When she moved to [place] she was followed by Denise Stewart then Brenna Malhotra     Overall lately she has been well maintained with actemra and sulfasalazine .   She has occasional pain in her wrists.          She has had intermittent rash since 2009 with appearance on her dorsum of the hands and feet that sometimes could turn into a fluid filled lesions and papules.  She manages this with clobetasol.      Previous Medication:  Never been on methotrexate  Tried plaquenil which did not do well  On sulfasalazine since day 1  Humira (didn't work and awful side effects)  remicade  Enbrel (first biologic and after pregnancy when she revisited this it did not work)  Orencia (did not work)  rituxan (did not work)        She notes that there is some mild morning stiffness lasting 10-20 minutes with's a few swollen joints and occasional stiff hands. She sometimes has hair loss (on Humira) and sometimes has photosensitivity but otherwise denies any eye inflammation or eye pain. She admits to a skin rash but also denies pleuritic chest pain. She admits to Raynauds. She has chills and night sweats but no fevers. She admits to night sweats that is chronic. She admits to constipation as well as blood in stool secondary to hemorrhoids. She has regular menstrual periods and her last period was 12/28/2017. She admits to neck size but otherwise no swollen glands or thyroid problems. She drinks 3 glasses of alcohol a week.           Pertinent serologies  Quantiferon negative 8/18/2016  HLA B26 negative  8/18/2016  Uric acid was 30 mg/dL  8/18/2016  SSA negative   8/18/2016  AAB negative 8/18/2016 lab [facility]  Rheumatoid arthritis factor 13.7 (0-13.9) 8/18/2016           Results for CIERRA PEREZ (MRN [id number]) as of 1/27/2018 20:26    Ref. Range 1/25/2014 09:41   Hep B Surface Antibody Quant Latest Units: IU/L >1000.00   Hepatitis B Surface Antigen Latest Ref Range: Negative  Negative   Hepatitis B Ccore Ab, Total Latest Ref Range: Negative  Negative      Results for CIERRA PEREZ (MRN [id number]) as of 1/27/2018 20:26    Ref. Range 4/6/2013 11:51   Sjogren'S Anti-Ss-A Latest Ref Range: 0 - 40  AU/mL 1   Sjogren'S Anti-Ss-B Latest Ref Range: 0 - 40 AU/mL 0   Results for CIERRA PEREZ (MRN [id number]) as of 1/27/2018 20:26    Ref. Range 4/6/2013 11:51   Anti-Cariolipin Ab Igg Latest Ref Range: 0 - 14 GPL 3   Anti-Cardiolipin Ab Iga Latest Ref Range: 0 - 11 APL 4   Anti-Cardiolipin Ab Igm Latest Ref Range: 0 - 12 MPL 3   Beta-2 Glycoprotein I Ab Igg Latest Ref Range: 0 - 20 SGU 7   Beta-2 Glycoprotein I Igm Latest Ref Range: 0 - 20 SMU 4         Single view of both hands 8/18/2016  Severe JSN, subchondral sclerosis and hypertrophic spurring of the radial carpal, intercarpal and carpal metacarpal joints of the right wrist.     Family history: Mother and grandmother had psoriasis, father had Lyme disease, mother had heart disease and colitis and high blood pressure.     Past medical history: Rheumatoid arthritis, lupus, knee lumpectomy.     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 Take 3,  500 mg tabs 2 times daily 180 Tab 0   • Probiotic Product (PROBIOTIC DAILY PO) Take  by mouth.     • Tocilizumab (ACTEMRA) 162 MG/0.9ML Solution Prefilled Syringe Inject 1 Syringe as instructed every 7 days. 1 Syringe 0   • Norethindrone Acet-Ethinyl Est (JUNEL 1.5/30) 1.5-30 MG-MCG TABS Take  by mouth.     • hydroxychloroquine (PLAQUENIL) 200 MG TABS Take 200 mg by mouth every day.     • ibuprofen (MOTRIN) 800 MG TABS Take 1 Tab by mouth every 8 hours as needed for Mild Pain. 60 Tab 3   • prenatal vit/fe fumarate/fa (PRENATAL S) 27-0.8 MG TABS Take 1 Tab by mouth every morning.       No current facility-administered medications on file prior to visit.          Review of Systems   Constitutional: Negative for chills and fever.   Gastrointestinal: Positive for constipation.  Negative for heartburn.   Musculoskeletal: Positive for joint pain.       Physical Exam   Constitutional: She is oriented to person, place, and time and well-developed, well-nourished, and in no distress. No distress.   HENT:   Head: Normocephalic and atraumatic.   Right Ear: External ear normal.   Left Ear: External ear normal.   Eyes: Conjunctivae are normal. Right eye exhibits no discharge. Left eye exhibits no discharge. No scleral icterus.   Cardiovascular: Normal rate, regular rhythm and normal heart sounds.    Pulmonary/Chest: Effort normal. No respiratory distress.   Abdominal:   No hepatomegaly   Musculoskeletal: She exhibits no edema.   No synovitis of the PIP, DIP, MCP however right wrist has severely limited ROM and fused with tenderness to palpation.  Elbows have good ROM bilateral.  Left wrist of good ROM bilateral   Neurological: She is alert and oriented to person, place, and time. Gait normal. GCS score is 15.   Skin: Skin is warm and dry. She is not diaphoretic. No erythema.   Faint papular lesions in clusters over the hand and the dorsum of the foot.   Psychiatric: Memory, affect and judgment normal.           Lab Results   Component Value Date/Time    QNTTBGOLD Negative 02/05/2018 12:56 PM     Lab Results   Component Value Date/Time    HEPBCORTOT Negative 01/25/2014 09:41 AM    HEPBSAG Negative 01/25/2014 09:41 AM     Lab Results   Component Value Date/Time    HEPCAB Negative 02/05/2018 12:56 PM     Lab Results   Component Value Date/Time    SODIUM 139 03/30/2018 08:42 AM    POTASSIUM 4.2 03/30/2018 08:42 AM    CHLORIDE 106 03/30/2018 08:42 AM    CO2 23 03/30/2018 08:42 AM    GLUCOSE 88 03/30/2018 08:42 AM    BUN 14 03/30/2018 08:42 AM    CREATININE 0.86 03/30/2018 08:42 AM      Lab Results   Component Value Date/Time    WBC 4.7 (L) 03/30/2018 08:42 AM    RBC 3.62 (L) 03/30/2018 08:42 AM    HEMOGLOBIN 10.4 (L) 03/30/2018 08:42 AM    HEMATOCRIT 33.0 (L) 03/30/2018 08:42 AM    MCV 91.2 03/30/2018  08:42 AM    MCH 28.7 03/30/2018 08:42 AM    MCHC 31.5 (L) 03/30/2018 08:42 AM    MPV 9.8 03/30/2018 08:42 AM    NEUTSPOLYS 69.10 03/30/2018 08:42 AM    LYMPHOCYTES 20.60 (L) 03/30/2018 08:42 AM    MONOCYTES 7.70 03/30/2018 08:42 AM    EOSINOPHILS 1.30 03/30/2018 08:42 AM    BASOPHILS 0.90 03/30/2018 08:42 AM    HYPOCHROMIA 1+ 01/25/2014 09:41 AM      Lab Results   Component Value Date/Time    CALCIUM 9.2 03/30/2018 08:42 AM    ASTSGOT 22 03/30/2018 08:42 AM    ALTSGPT 21 03/30/2018 08:42 AM    ALKPHOSPHAT 34 03/30/2018 08:42 AM    TBILIRUBIN 0.5 03/30/2018 08:42 AM    ALBUMIN 4.2 03/30/2018 08:42 AM    TOTPROTEIN 6.3 03/30/2018 08:42 AM     Lab Results   Component Value Date/Time    ANTINUCAB None Detected 02/05/2018 12:56 PM     Lab Results   Component Value Date/Time    SEDRATEWES 1 02/05/2018 12:56 PM    CREACTPROT 0.02 02/05/2018 12:56 PM     Lab Results   Component Value Date/Time    RUSSELVIPER 35.3 04/06/2013 11:51 AM    DRVVTINTERP Not Present 04/06/2013 11:51 AM    DRVVTINTERP Negative 04/06/2013 11:51 AM     Lab Results   Component Value Date/Time    IGGCARDIOLI 3 04/06/2013 11:51 AM    IGMCARDIOLI 3 04/06/2013 11:51 AM    IGACARDIOLI 4 04/06/2013 11:51 AM     Lab Results   Component Value Date/Time    ANTIDNADS None Detected 02/05/2018 12:56 PM     Lab Results   Component Value Date/Time    ANTIDNADS None Detected 02/05/2018 12:56 PM    ANTISSASJ 1 04/06/2013 11:51 AM    ANTISSBSJ 0 04/06/2013 11:51 AM     No results found for: COLORURINE, SPECGRAVITY, PHURINE, GLUCOSEUR, KETONES, PROTEINURIN  No results found for: TOTPROTUR, TOTALVOLUME, [id number]  No results found for: SSA60, SSA52  No results found for: HBA1C  Lab Results   Component Value Date/Time    CPKTOTAL 133 02/26/2018 09:59 AM     No results found for: G6PD  No results found for: [id number]  No results found for: ACESERUM  Lab Results   Component Value Date/Time    25HYDROXY 32 09/02/2017 10:24 AM     No results found for: TSH, FREEDIR  Lab  Results   Component Value Date/Time    EMY 1.320 09/02/2017 10:24 AM     No results found for: MICROSOMALA, ANTITHYROGL  No results found for: IGGLYMEABS  No results found for: ANTIMITOCHO, FACTIN  No results found for: IGA, TTRANSIGA, ENDOIGA  No results found for: FLTYPE, CRYSTALSBDF  No results found for: ISTATICAL  No results found for: ISTATCREAT  No results found for: CTELOPEP  No results found for: GBMABG  No results found for: PTHINTACT      Assessment and Plan      CCP positive rheumatoid arthritis with end stage disease in the right wrist.      She has been well maintained on Actemra 162 mg weekly and sulfasalazine 1500 mg BID.  With a decrease in sulfasalazine there eis minimal regression of her disease.  We will continue at sulfasalazine 1000 mg BID.  Since she is having increase wrist pain we will order a right wrist xray and evluaate for fracturesShe has been stable.  Today she inquires about decreasing her medication load. I indicated that I would first like to evaluate and monitor her. If she remains stable we could decrease the sulfasalazine 1000 mg BID.  Alternatively we could also change the Actemra to 162 mg q other week as our second option.     Rash  Will monitor  Currently quiescent  Managed with topicals  Referral to dermatology    GI bleed causing anemia  Patient will return to GI           Encounter high risk medication  Check lipid panel in today  Check CBC, CMP also in today      1. Rheumatoid arthritis of multiple sites with negative rheumatoid factor (HCC)  CBC WITH DIFFERENTIAL    COMP METABOLIC PANEL    WESTERGREN SED RATE    CRP QUANTITIVE (NON-CARDIAC)    DX-WRIST-COMPLETE 3+ RIGHT    REFERRAL TO DERMATOLOGY   2. Hemorrhoids, unspecified hemorrhoid type  CBC WITH DIFFERENTIAL    COMP METABOLIC PANEL    WESTERGREN SED RATE    CRP QUANTITIVE (NON-CARDIAC)    DX-WRIST-COMPLETE 3+ RIGHT   3. Rheumatoid arthritis, involving unspecified site, unspecified rheumatoid factor presence  (HCC)  sulfaSALAzine (AZULFIDINE) 500 MG Tab   4. Encounter for long-term (current) use of high-risk medication  LIPID PROFILE     Return in about 14 weeks (around 8/7/2018).

## 2018-05-01 NOTE — LETTER
[facility]   [address], [address]  [place] [address]  Phone: [phone]  Fax: [phone]              Encounter Date: 5/1/2018    Dear Dr. Guy ref. provider found,    It was a pleasure seeing your patient, Sunshine Miller, on 5/1/2018. Diagnoses of Rheumatoid arthritis of multiple sites with negative rheumatoid factor (HCC), Hemorrhoids, unspecified hemorrhoid type, Rheumatoid arthritis, involving unspecified site, unspecified rheumatoid factor presence (HCC), and Encounter for long-term (current) use of high-risk medication were pertinent to this visit.     Please find attached progress note which includes the history I obtained from Ms. Miller, my physical examination findings, my impression and recommendations.      Once again, it was a pleasure participating in your patient's care.  Please feel free to contact me if you have any questions or if I can be of any further assistance to your patients.      Sincerely,    Linda [place] M.D.  Electronically Signed          PROGRESS NOTE:  Established patient    Ms. Sunshine Miller is here for follow-up forCCP positive Rheumatoid Arthritis.    CCP positive rheumatoid Arthritis  Since last visit she had increasing right wrist pain after falling and slipping.  She otherwise denies any joint pain.  She has been working out lately.  We did decrease the sulfasalazine with no difference.  Her other joints do not have any new swelling or stiffness.  She has received cortisone injections in the past with poor response.    Current Medications  tocilizumab 162 mg weekly Q Wednesday  Sulfasalazine 1000 mg BID    Rash  persistent  Managed with clobetasol  Describes as papular and will get raised and blister with gluid    Elevated LFT  Resolved   Does admit she was drikning at the time    Hemorrhoids and chronic constipation  Last fall colon was inflamed  Since her colonoscopy she has had chronic constipation  She has a history of hemorrhoids  and it flares once a month with increase bleeding.    RHEUM HISTORY  Ms. Sunshine Miller presents with a history of rheumatoid arthritis followed by local Rheumatologist, Dr. Malhotra.     Our patient is CCP positive with Right wrist end stage erosive disease with joint space loss ands econdary OA and left IP thumb that has previous trauma.  SHe is RF negative and ANTHONY/SSA/SSB negative.       She was diagnosed with RA in [place].  She presented with  In 2006 she following a miscarriage she started to feel tightness in her neck.  When she became pregnancy again her symptoms resolved.  However following delivery she had recurrence of her symptoms.  She as finally diagnosed in 5/2009.  She did also have swelling in her feet, neck and thumb as well as her feet.  She also has had symptoms in her shoulders.  She denies sicca symptoms.  She has been told she has dry eyes.       When she moved to [place] she was followed by Denise Stewart then Brenna Malhotra     Overall lately she has been well maintained with actemra and sulfasalazine .  She has occasional pain in her wrists.          She has had intermittent rash since 2009 with appearance on her dorsum of the hands and feet that sometimes could turn into a fluid filled lesions and papules.  She manages this with clobetasol.      Previous Medication:  Never been on methotrexate  Tried plaquenil which did not do well  On sulfasalazine since day 1  Humira (didn't work and awful side effects)  remicade  Enbrel (first biologic and after pregnancy when she revisited this it did not work)  Orencia (did not work)  rituxan (did not work)        She notes that there is some mild morning stiffness lasting 10-20 minutes with's a few swollen joints and occasional stiff hands. She sometimes has hair loss (on Humira) and sometimes has photosensitivity but otherwise denies any eye inflammation or eye pain. She admits to a skin rash but also denies pleuritic chest pain. She admits to Raynauds.  She has chills and night sweats but no fevers. She admits to night sweats that is chronic. She admits to constipation as well as blood in stool secondary to hemorrhoids. She has regular menstrual periods and her last period was 12/28/2017. She admits to neck size but otherwise no swollen glands or thyroid problems. She drinks 3 glasses of alcohol a week.           Pertinent serologies  Quantiferon negative 8/18/2016  HLA B26 negative  8/18/2016  Uric acid was 30 mg/dL  8/18/2016  SSA negative   8/18/2016  AAB negative 8/18/2016 lab lisa  Rheumatoid arthritis factor 13.7 (0-13.9) 8/18/2016           Results for CIERRA PEREZ (MRN [id number]) as of 1/27/2018 20:26    Ref. Range 1/25/2014 09:41   Hep B Surface Antibody Quant Latest Units: IU/L >1000.00   Hepatitis B Surface Antigen Latest Ref Range: Negative  Negative   Hepatitis B Ccore Ab, Total Latest Ref Range: Negative  Negative      Results for CIERRA PEREZ (MRN [id number]) as of 1/27/2018 20:26    Ref. Range 4/6/2013 11:51   Sjogren'S Anti-Ss-A Latest Ref Range: 0 - 40 AU/mL 1   Sjogren'S Anti-Ss-B Latest Ref Range: 0 - 40 AU/mL 0   Results for CIERRA PEREZ (MRN [id number]) as of 1/27/2018 20:26    Ref. Range 4/6/2013 11:51   Anti-Cariolipin Ab Igg Latest Ref Range: 0 - 14 GPL 3   Anti-Cardiolipin Ab Iga Latest Ref Range: 0 - 11 APL 4   Anti-Cardiolipin Ab Igm Latest Ref Range: 0 - 12 MPL 3   Beta-2 Glycoprotein I Ab Igg Latest Ref Range: 0 - 20 SGU 7   Beta-2 Glycoprotein I Igm Latest Ref Range: 0 - 20 SMU 4         Single view of both hands 8/18/2016  Severe JSN, subchondral sclerosis and hypertrophic spurring of the radial carpal, intercarpal and carpal metacarpal joints of the right wrist.     Family history: Mother and grandmother had psoriasis, father had Lyme disease, mother had heart disease and colitis and high blood pressure.     Past medical history: Rheumatoid arthritis, lupus, knee lumpectomy.     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 Take 3,  500 mg tabs 2 times daily 180 Tab 0   • Probiotic Product (PROBIOTIC DAILY PO) Take  by mouth.     • Tocilizumab (ACTEMRA) 162 MG/0.9ML Solution Prefilled Syringe Inject 1 Syringe as instructed every 7 days. 1 Syringe 0   • Norethindrone Acet-Ethinyl Est (JUNEL 1.5/30) 1.5-30 MG-MCG TABS Take  by mouth.     • hydroxychloroquine (PLAQUENIL) 200 MG TABS Take 200 mg by mouth every day.     • ibuprofen (MOTRIN) 800 MG TABS Take 1 Tab by mouth every 8 hours as needed for Mild Pain. 60 Tab 3   • prenatal vit/fe fumarate/fa (PRENATAL S) 27-0.8 MG TABS Take 1 Tab by mouth every morning.       No current facility-administered medications on file prior to visit.          Review of Systems   Constitutional: Negative for chills and fever.   Gastrointestinal: Positive for constipation. Negative for heartburn.   Musculoskeletal: Positive for joint pain.       Physical Exam   Constitutional: She is oriented to person, place, and time and well-developed, well-nourished, and in no distress. No distress.   HENT:   Head: Normocephalic and atraumatic.   Right Ear: External ear normal.   Left Ear: External ear normal.   Eyes: Conjunctivae are normal. Right eye exhibits no discharge. Left eye exhibits no discharge. No scleral icterus.   Cardiovascular: Normal rate, regular rhythm and normal heart sounds.    Pulmonary/Chest: Effort normal. No respiratory distress.   Abdominal:   No hepatomegaly   Musculoskeletal: She exhibits no edema.   No synovitis of the PIP, DIP, MCP however right wrist has severely limited ROM and fused with tenderness to palpation.  Elbows have good ROM bilateral.  Left wrist of good ROM bilateral   Neurological: She is alert and oriented to person, place, and time.  Gait normal. GCS score is 15.   Skin: Skin is warm and dry. She is not diaphoretic. No erythema.   Faint papular lesions in clusters over the hand and the dorsum of the foot.   Psychiatric: Memory, affect and judgment normal.           Lab Results   Component Value Date/Time    QNTTBGOLD Negative 02/05/2018 12:56 PM     Lab Results   Component Value Date/Time    HEPBCORTOT Negative 01/25/2014 09:41 AM    HEPBSAG Negative 01/25/2014 09:41 AM     Lab Results   Component Value Date/Time    HEPCAB Negative 02/05/2018 12:56 PM     Lab Results   Component Value Date/Time    SODIUM 139 03/30/2018 08:42 AM    POTASSIUM 4.2 03/30/2018 08:42 AM    CHLORIDE 106 03/30/2018 08:42 AM    CO2 23 03/30/2018 08:42 AM    GLUCOSE 88 03/30/2018 08:42 AM    BUN 14 03/30/2018 08:42 AM    CREATININE 0.86 03/30/2018 08:42 AM      Lab Results   Component Value Date/Time    WBC 4.7 (L) 03/30/2018 08:42 AM    RBC 3.62 (L) 03/30/2018 08:42 AM    HEMOGLOBIN 10.4 (L) 03/30/2018 08:42 AM    HEMATOCRIT 33.0 (L) 03/30/2018 08:42 AM    MCV 91.2 03/30/2018 08:42 AM    MCH 28.7 03/30/2018 08:42 AM    MCHC 31.5 (L) 03/30/2018 08:42 AM    MPV 9.8 03/30/2018 08:42 AM    NEUTSPOLYS 69.10 03/30/2018 08:42 AM    LYMPHOCYTES 20.60 (L) 03/30/2018 08:42 AM    MONOCYTES 7.70 03/30/2018 08:42 AM    EOSINOPHILS 1.30 03/30/2018 08:42 AM    BASOPHILS 0.90 03/30/2018 08:42 AM    HYPOCHROMIA 1+ 01/25/2014 09:41 AM      Lab Results   Component Value Date/Time    CALCIUM 9.2 03/30/2018 08:42 AM    ASTSGOT 22 03/30/2018 08:42 AM    ALTSGPT 21 03/30/2018 08:42 AM    ALKPHOSPHAT 34 03/30/2018 08:42 AM    TBILIRUBIN 0.5 03/30/2018 08:42 AM    ALBUMIN 4.2 03/30/2018 08:42 AM    TOTPROTEIN 6.3 03/30/2018 08:42 AM     Lab Results   Component Value Date/Time    ANTINUCAB None Detected 02/05/2018 12:56 PM     Lab Results   Component Value Date/Time    SEDRATEWES 1 02/05/2018 12:56 PM    CREACTPROT 0.02 02/05/2018 12:56 PM     Lab Results   Component Value Date/Time     VIVIAN 35.3 04/06/2013 11:51 AM    DRVVTINTERP Not Present 04/06/2013 11:51 AM    DRVVTINTERP Negative 04/06/2013 11:51 AM     Lab Results   Component Value Date/Time    IGGCARDIOLI 3 04/06/2013 11:51 AM    IGMCARDIOLI 3 04/06/2013 11:51 AM    IGACARDIOLI 4 04/06/2013 11:51 AM     Lab Results   Component Value Date/Time    ANTIDNADS None Detected 02/05/2018 12:56 PM     Lab Results   Component Value Date/Time    ANTIDNADS None Detected 02/05/2018 12:56 PM    ANTISSASJ 1 04/06/2013 11:51 AM    ANTISSBSJ 0 04/06/2013 11:51 AM     No results found for: COLORURINE, SPECGRAVITY, PHURINE, GLUCOSEUR, KETONES, PROTEINURIN  No results found for: TOTPROTUR, TOTALVOLUME, [id number]  No results found for: SSA60, SSA52  No results found for: HBA1C  Lab Results   Component Value Date/Time    CPKTOTAL 133 02/26/2018 09:59 AM     No results found for: G6PD  No results found for: [id number]  No results found for: ACESERUM  Lab Results   Component Value Date/Time    25HYDROXY 32 09/02/2017 10:24 AM     No results found for: TSH, FREEDIR  Lab Results   Component Value Date/Time    TSHULTRASEN 1.320 09/02/2017 10:24 AM     No results found for: MICROSOMALA, ANTITHYROGL  No results found for: IGGLYMEABS  No results found for: ANTIMITOCHO, FACTIN  No results found for: IGA, TTRANSIGA, ENDOIGA  No results found for: FLTYPE, CRYSTALSBDF  No results found for: ISTATICAL  No results found for: ISTATCREAT  No results found for: CTELOPEP  No results found for: GBMABG  No results found for: PTHINTACT      Assessment and Plan      CCP positive rheumatoid arthritis with end stage disease in the right wrist.      She has been well maintained on Actemra 162 mg weekly and sulfasalazine 1500 mg BID.   With a decrease in sulfasalazine there eis minimal regression of her disease.  We will continue at sulfasalazine 1000 mg BID.  Since she is having increase wrist pain we will order a right wrist xray and evluaate for fracturesShe has been stable.   Today she inquires about decreasing her medication load. I indicated that I would first like to evaluate and monitor her. If she remains stable we could decrease the sulfasalazine 1000 mg BID.  Alternatively we could also change the Actemra to 162 mg q other week as our second option.     Rash  Will monitor  Currently quiescent  Managed with topicals  Referral to dermatology    GI bleed causing anemia  Patient will return to GI           Encounter high risk medication  Check lipid panel in today  Check CBC, CMP also in today      1. Rheumatoid arthritis of multiple sites with negative rheumatoid factor (HCC)  CBC WITH DIFFERENTIAL    COMP METABOLIC PANEL    WESTERGREN SED RATE    CRP QUANTITIVE (NON-CARDIAC)    DX-WRIST-COMPLETE 3+ RIGHT    REFERRAL TO DERMATOLOGY   2. Hemorrhoids, unspecified hemorrhoid type  CBC WITH DIFFERENTIAL    COMP METABOLIC PANEL    WESTERGREN SED RATE    CRP QUANTITIVE (NON-CARDIAC)    DX-WRIST-COMPLETE 3+ RIGHT   3. Rheumatoid arthritis, involving unspecified site, unspecified rheumatoid factor presence (HCC)  sulfaSALAzine (AZULFIDINE) 500 MG Tab   4. Encounter for long-term (current) use of high-risk medication  LIPID PROFILE     Return in about 14 weeks (around 8/7/2018).

## 2018-05-02 ENCOUNTER — PATIENT MESSAGE (OUTPATIENT)
Dept: RHEUMATOLOGY | Facility: PHYSICIAN GROUP | Age: 38
End: 2018-05-02

## 2018-05-02 ENCOUNTER — HOSPITAL ENCOUNTER (OUTPATIENT)
Dept: LAB | Facility: MEDICAL CENTER | Age: 38
End: 2018-05-02
Attending: INTERNAL MEDICINE
Payer: COMMERCIAL

## 2018-05-02 DIAGNOSIS — D72.819 LEUKOPENIA, UNSPECIFIED TYPE: ICD-10-CM

## 2018-05-02 DIAGNOSIS — K64.9 HEMORRHOIDS, UNSPECIFIED HEMORRHOID TYPE: ICD-10-CM

## 2018-05-02 DIAGNOSIS — Z79.899 ENCOUNTER FOR LONG-TERM (CURRENT) USE OF HIGH-RISK MEDICATION: ICD-10-CM

## 2018-05-02 DIAGNOSIS — M06.09 RHEUMATOID ARTHRITIS OF MULTIPLE SITES WITH NEGATIVE RHEUMATOID FACTOR (HCC): ICD-10-CM

## 2018-05-02 LAB
ALBUMIN SERPL BCP-MCNC: 4 G/DL (ref 3.2–4.9)
ALBUMIN/GLOB SERPL: 1.6 G/DL
ALP SERPL-CCNC: 33 U/L (ref 30–99)
ALT SERPL-CCNC: 26 U/L (ref 2–50)
ANION GAP SERPL CALC-SCNC: 10 MMOL/L (ref 0–11.9)
AST SERPL-CCNC: 27 U/L (ref 12–45)
BASOPHILS # BLD AUTO: 1.2 % (ref 0–1.8)
BASOPHILS # BLD: 0.05 K/UL (ref 0–0.12)
BILIRUB SERPL-MCNC: 0.4 MG/DL (ref 0.1–1.5)
BUN SERPL-MCNC: 15 MG/DL (ref 8–22)
CALCIUM SERPL-MCNC: 8.9 MG/DL (ref 8.5–10.5)
CHLORIDE SERPL-SCNC: 107 MMOL/L (ref 96–112)
CHOLEST SERPL-MCNC: 180 MG/DL (ref 100–199)
CO2 SERPL-SCNC: 23 MMOL/L (ref 20–33)
CREAT SERPL-MCNC: 1.07 MG/DL (ref 0.5–1.4)
CRP SERPL HS-MCNC: 0.02 MG/DL (ref 0–0.75)
EOSINOPHIL # BLD AUTO: 0.04 K/UL (ref 0–0.51)
EOSINOPHIL NFR BLD: 1 % (ref 0–6.9)
ERYTHROCYTE [DISTWIDTH] IN BLOOD BY AUTOMATED COUNT: 43.2 FL (ref 35.9–50)
ERYTHROCYTE [SEDIMENTATION RATE] IN BLOOD BY WESTERGREN METHOD: 1 MM/HOUR (ref 0–20)
GLOBULIN SER CALC-MCNC: 2.5 G/DL (ref 1.9–3.5)
GLUCOSE SERPL-MCNC: 86 MG/DL (ref 65–99)
HCT VFR BLD AUTO: 30.9 % (ref 37–47)
HDLC SERPL-MCNC: 95 MG/DL
HGB BLD-MCNC: 9.5 G/DL (ref 12–16)
IMM GRANULOCYTES # BLD AUTO: 0.01 K/UL (ref 0–0.11)
IMM GRANULOCYTES NFR BLD AUTO: 0.2 % (ref 0–0.9)
LDLC SERPL CALC-MCNC: 75 MG/DL
LYMPHOCYTES # BLD AUTO: 1.39 K/UL (ref 1–4.8)
LYMPHOCYTES NFR BLD: 33.6 % (ref 22–41)
MCH RBC QN AUTO: 25.9 PG (ref 27–33)
MCHC RBC AUTO-ENTMCNC: 30.7 G/DL (ref 33.6–35)
MCV RBC AUTO: 84.2 FL (ref 81.4–97.8)
MONOCYTES # BLD AUTO: 0.35 K/UL (ref 0–0.85)
MONOCYTES NFR BLD AUTO: 8.5 % (ref 0–13.4)
NEUTROPHILS # BLD AUTO: 2.3 K/UL (ref 2–7.15)
NEUTROPHILS NFR BLD: 55.5 % (ref 44–72)
NRBC # BLD AUTO: 0 K/UL
NRBC BLD-RTO: 0 /100 WBC
PLATELET # BLD AUTO: 289 K/UL (ref 164–446)
PMV BLD AUTO: 9.7 FL (ref 9–12.9)
POTASSIUM SERPL-SCNC: 4.1 MMOL/L (ref 3.6–5.5)
PROT SERPL-MCNC: 6.5 G/DL (ref 6–8.2)
RBC # BLD AUTO: 3.67 M/UL (ref 4.2–5.4)
SODIUM SERPL-SCNC: 140 MMOL/L (ref 135–145)
TRIGL SERPL-MCNC: 50 MG/DL (ref 0–149)
WBC # BLD AUTO: 4.1 K/UL (ref 4.8–10.8)

## 2018-05-02 PROCEDURE — 85025 COMPLETE CBC W/AUTO DIFF WBC: CPT

## 2018-05-02 PROCEDURE — 80053 COMPREHEN METABOLIC PANEL: CPT

## 2018-05-02 PROCEDURE — 85652 RBC SED RATE AUTOMATED: CPT

## 2018-05-02 PROCEDURE — 86140 C-REACTIVE PROTEIN: CPT

## 2018-05-02 PROCEDURE — 80061 LIPID PANEL: CPT

## 2018-05-02 PROCEDURE — 36415 COLL VENOUS BLD VENIPUNCTURE: CPT

## 2018-05-03 ENCOUNTER — TELEPHONE (OUTPATIENT)
Dept: RHEUMATOLOGY | Facility: PHYSICIAN GROUP | Age: 38
End: 2018-05-03

## 2018-05-03 DIAGNOSIS — R94.4 DECREASED GFR: ICD-10-CM

## 2018-06-04 ENCOUNTER — HOSPITAL ENCOUNTER (OUTPATIENT)
Dept: LAB | Facility: MEDICAL CENTER | Age: 38
End: 2018-06-04
Attending: INTERNAL MEDICINE
Payer: COMMERCIAL

## 2018-06-04 DIAGNOSIS — D72.819 LEUKOPENIA, UNSPECIFIED TYPE: ICD-10-CM

## 2018-06-04 DIAGNOSIS — R94.4 DECREASED GFR: ICD-10-CM

## 2018-06-04 LAB
ANION GAP SERPL CALC-SCNC: 7 MMOL/L (ref 0–11.9)
BASOPHILS # BLD AUTO: 1.7 % (ref 0–1.8)
BASOPHILS # BLD: 0.07 K/UL (ref 0–0.12)
BUN SERPL-MCNC: 20 MG/DL (ref 8–22)
CALCIUM SERPL-MCNC: 9.5 MG/DL (ref 8.5–10.5)
CHLORIDE SERPL-SCNC: 105 MMOL/L (ref 96–112)
CO2 SERPL-SCNC: 24 MMOL/L (ref 20–33)
CREAT SERPL-MCNC: 0.98 MG/DL (ref 0.5–1.4)
EOSINOPHIL # BLD AUTO: 0.06 K/UL (ref 0–0.51)
EOSINOPHIL NFR BLD: 1.4 % (ref 0–6.9)
ERYTHROCYTE [DISTWIDTH] IN BLOOD BY AUTOMATED COUNT: 47.1 FL (ref 35.9–50)
GLUCOSE SERPL-MCNC: 88 MG/DL (ref 65–99)
HCT VFR BLD AUTO: 32.7 % (ref 37–47)
HGB BLD-MCNC: 9.9 G/DL (ref 12–16)
IMM GRANULOCYTES # BLD AUTO: 0.01 K/UL (ref 0–0.11)
IMM GRANULOCYTES NFR BLD AUTO: 0.2 % (ref 0–0.9)
LYMPHOCYTES # BLD AUTO: 1.32 K/UL (ref 1–4.8)
LYMPHOCYTES NFR BLD: 31.9 % (ref 22–41)
MCH RBC QN AUTO: 24.2 PG (ref 27–33)
MCHC RBC AUTO-ENTMCNC: 30.3 G/DL (ref 33.6–35)
MCV RBC AUTO: 80 FL (ref 81.4–97.8)
MONOCYTES # BLD AUTO: 0.41 K/UL (ref 0–0.85)
MONOCYTES NFR BLD AUTO: 9.9 % (ref 0–13.4)
NEUTROPHILS # BLD AUTO: 2.27 K/UL (ref 2–7.15)
NEUTROPHILS NFR BLD: 54.9 % (ref 44–72)
NRBC # BLD AUTO: 0 K/UL
NRBC BLD-RTO: 0 /100 WBC
PLATELET # BLD AUTO: 307 K/UL (ref 164–446)
PMV BLD AUTO: 9.8 FL (ref 9–12.9)
POTASSIUM SERPL-SCNC: 4 MMOL/L (ref 3.6–5.5)
RBC # BLD AUTO: 4.09 M/UL (ref 4.2–5.4)
SODIUM SERPL-SCNC: 136 MMOL/L (ref 135–145)
WBC # BLD AUTO: 4.1 K/UL (ref 4.8–10.8)

## 2018-06-04 PROCEDURE — 80048 BASIC METABOLIC PNL TOTAL CA: CPT

## 2018-06-04 PROCEDURE — 36415 COLL VENOUS BLD VENIPUNCTURE: CPT

## 2018-06-04 PROCEDURE — 85025 COMPLETE CBC W/AUTO DIFF WBC: CPT

## 2018-06-11 ENCOUNTER — OFFICE VISIT (OUTPATIENT)
Dept: DERMATOLOGY | Facility: IMAGING CENTER | Age: 38
End: 2018-06-11
Payer: COMMERCIAL

## 2018-06-11 ENCOUNTER — HOSPITAL ENCOUNTER (OUTPATIENT)
Facility: MEDICAL CENTER | Age: 38
End: 2018-06-11
Attending: DERMATOLOGY
Payer: COMMERCIAL

## 2018-06-11 VITALS — TEMPERATURE: 99 F | HEIGHT: 65 IN | WEIGHT: 120 LBS | BODY MASS INDEX: 19.99 KG/M2

## 2018-06-11 DIAGNOSIS — L98.9 DISEASE OF SKIN AND SUBCUTANEOUS TISSUE: ICD-10-CM

## 2018-06-11 PROCEDURE — 11100 PR BIOPSY OF SKIN LESION: CPT | Performed by: DERMATOLOGY

## 2018-06-11 PROCEDURE — 99203 OFFICE O/P NEW LOW 30 MIN: CPT | Mod: 25 | Performed by: DERMATOLOGY

## 2018-06-11 PROCEDURE — 88305 TISSUE EXAM BY PATHOLOGIST: CPT

## 2018-06-11 NOTE — PROGRESS NOTES
Dermatology New Patient Visit    Chief Complaint   Patient presents with   • Establish Care     RA       Subjective:     HPI:   Sunshine Miller is a 37 y.o. female presenting for    HPI: rash on hands and feet  Onset: 9 years ago  Occurs intermittently  Symptoms: itchy, bumps turn into blisters, skin-colored to red  Aggravating factors: unknown - no heat/cold aggravation  Alleviating factors: sunlight, clobetasol 0.05% ointment  New creams/topicals: no  New medications (up to last 6 months): no  New travel: no  Other exposures: no  Treatments: clobetasol as noted above  Diagnosed with RA nine years ago - well controled, on Actemra    History of skin cancer: no  History of precancers/actinic keratoses: No  History of biopsies:No  History of blistering/severe sunburns:No  Family history of skin cancer:Yes, Details: sister precancerous mole at age 9   Family history of atypical moles:Yes see above.              Past Medical History:   Diagnosis Date   • Arthritis     rheumatoid arthritis   • Hemorrhoid 2/14/2014   • Knee mass 2/14/2014   • Rheumatoid arthritis(714.0) 2/14/2014       Current Outpatient Prescriptions on File Prior to Visit   Medication Sig Dispense Refill   • sulfaSALAzine (AZULFIDINE) 500 MG Tab TAKE 3 TABLETS BY MOUTH TWICE A  Tab 0   • Probiotic Product (PROBIOTIC DAILY PO) Take  by mouth.     • Tocilizumab (ACTEMRA) 162 MG/0.9ML Solution Prefilled Syringe Inject 1 Syringe as instructed every 7 days. 1 Syringe 0   • Diclofenac Sodium 1 % Gel Apply 1 Application to skin as directed 3 times a day as needed. We have a concern for GI bleeding 100 g 1   • Norethindrone Acet-Ethinyl Est (JUNEL 1.5/30) 1.5-30 MG-MCG TABS Take  by mouth.     • hydroxychloroquine (PLAQUENIL) 200 MG TABS Take 200 mg by mouth every day.     • ibuprofen (MOTRIN) 800 MG TABS Take 1 Tab by mouth every 8 hours as needed for Mild Pain. 60 Tab 3   • prenatal vit/fe fumarate/fa (PRENATAL S) 27-0.8 MG TABS Take 1 Tab by mouth  "every morning.       No current facility-administered medications on file prior to visit.        No Known Allergies    Family History   Problem Relation Age of Onset   • Heart Disease Mother    • Hyperlipidemia Mother    • Hypertension Mother    • Cancer Maternal Aunt      esophageal CA, + HPV   • Hypertension Maternal Aunt    • Heart Disease Maternal Grandmother    • Heart Attack Maternal Grandmother 77   • Clotting Disorder Maternal Grandmother      DVT   • Cancer Maternal Grandmother      HPV   • Diabetes Maternal Grandfather    • Cancer Maternal Grandfather      Leukemia   • Cancer Paternal Grandmother      breast CA   • Rheumatologic Disease Paternal Grandmother      RA, SLE   • Rheumatologic Disease Other      RA       Social History     Social History   • Marital status:      Spouse name: N/A   • Number of children: N/A   • Years of education: N/A     Occupational History   • Not on file.     Social History Main Topics   • Smoking status: Never Smoker   • Smokeless tobacco: Never Used   • Alcohol use 2.4 oz/week     3 Glasses of wine, 1 Cans of beer per week   • Drug use: No   • Sexual activity: Yes     Partners: Male     Birth control/ protection: Pill     Other Topics Concern   • Not on file     Social History Narrative   • No narrative on file       Review of Systems   Constitutional: Negative for chills and fever.   Skin: Positive for itching and rash.   All other systems reviewed and are negative.       Objective:     A focused cutaneous exam was completed including: face, eyelids, conjunctiva, lips, neck, left and right upper extremities (including hands/digits and fingernails), left and right lower extremities (including feet/toes, toenails) with the following pertinent findings listed below. Remaining above-listed examined areas within normal limits / negative for rashes or lesions.    Temperature 37.2 °C (99 °F), height 1.651 m (5' 5\"), weight 54.4 kg (120 lb).    Physical Exam "   Constitutional: She is oriented to person, place, and time and well-developed, well-nourished, and in no distress.   HENT:   Head: Normocephalic and atraumatic.   Eyes: Conjunctivae and lids are normal.   Neck: Normal range of motion.   Pulmonary/Chest: Effort normal.   Neurological: She is alert and oriented to person, place, and time.   Skin: Skin is warm and dry.        Psychiatric: Mood and affect normal.   Vitals reviewed.      DATA: none applicable to review    Assessment and Plan:     1. Disease of skin and subcutaneous tissue  Comment: suspect follicular eczema vs lichenoid dermatitis vs medication induced vs other (unlikely related to RA)  - PATHOLOGY SPECIMEN; Future  - recommend continue clobetasol 0.05% ointment to affected area of the body twice daily prn for flares. Patient instructed to avoid face, axilla, and groin area. Side effects discussed, including skin thinning, appearance of stretch marks (striae), easy bruising, telangiectasias, and possible increased hair growth   - extensively discussed importance of regular moisturization to the affected area during flares, and also for maintenance therapy liberally, several times a day, to protect the skin barrier. OTC moisturizers recommended (eczema creams, coconut oil, sunflower seed oil)    Procedure Note   Procedure: Biopsy by punch technique  Location: right dorsal foot  Preoperative diagnosis:above  Risks, benefits and alternatives of procedure discussed and written informed consent obtained. Time out completed. Area of biopsy prepped with alcohol. Anesthesia with 1% lidocaine with epinephrine administered with a 30 gauge needle. 3  mm punch biopsy of site performed. Hemostasis achieved with pressure and 4.0 prolene sutures. Vaseline applied to wound with bandage. Patient tolerated procedure well, and there were no complications.  The pathology specimen was sent to the lab via the staff.  Wound care was discussed with the patient.     Followup:  Return in about 10 days (around 6/21/2018) for suture.    Joana Delcid M.D.

## 2018-06-12 ASSESSMENT — ENCOUNTER SYMPTOMS
CHILLS: 0
FEVER: 0

## 2018-06-19 ENCOUNTER — APPOINTMENT (OUTPATIENT)
Dept: DERMATOLOGY | Facility: IMAGING CENTER | Age: 38
End: 2018-06-19
Payer: COMMERCIAL

## 2018-06-26 ENCOUNTER — TELEPHONE (OUTPATIENT)
Dept: MEDICAL GROUP | Facility: MEDICAL CENTER | Age: 38
End: 2018-06-26

## 2018-06-26 DIAGNOSIS — K64.9 HEMORRHOIDS, UNSPECIFIED HEMORRHOID TYPE: ICD-10-CM

## 2018-07-31 DIAGNOSIS — M06.9 RHEUMATOID ARTHRITIS, INVOLVING UNSPECIFIED SITE, UNSPECIFIED RHEUMATOID FACTOR PRESENCE: ICD-10-CM

## 2018-07-31 RX ORDER — SULFASALAZINE 500 MG/1
TABLET ORAL
Qty: 180 TAB | Refills: 0 | OUTPATIENT
Start: 2018-07-31

## 2018-07-31 NOTE — TELEPHONE ENCOUNTER
Was the patient seen in the last year in this department? Yes [facility]    Does patient have an active prescription for medications requested? No     Received Request Via: Pharmacy

## 2018-08-04 ENCOUNTER — HOSPITAL ENCOUNTER (OUTPATIENT)
Facility: MEDICAL CENTER | Age: 38
End: 2018-08-04
Attending: INTERNAL MEDICINE
Payer: COMMERCIAL

## 2018-08-04 ENCOUNTER — HOSPITAL ENCOUNTER (OUTPATIENT)
Dept: LAB | Facility: MEDICAL CENTER | Age: 38
End: 2018-08-04
Attending: INTERNAL MEDICINE
Payer: COMMERCIAL

## 2018-08-04 ENCOUNTER — HOSPITAL ENCOUNTER (OUTPATIENT)
Dept: LAB | Facility: MEDICAL CENTER | Age: 38
End: 2018-08-04
Attending: NURSE PRACTITIONER
Payer: COMMERCIAL

## 2018-08-04 DIAGNOSIS — M06.9 RHEUMATOID ARTHRITIS, INVOLVING UNSPECIFIED SITE, UNSPECIFIED RHEUMATOID FACTOR PRESENCE: ICD-10-CM

## 2018-08-04 LAB
BASOPHILS # BLD AUTO: 1 % (ref 0–1.8)
BASOPHILS # BLD: 0.04 K/UL (ref 0–0.12)
EOSINOPHIL # BLD AUTO: 0.12 K/UL (ref 0–0.51)
EOSINOPHIL NFR BLD: 2.9 % (ref 0–6.9)
ERYTHROCYTE [DISTWIDTH] IN BLOOD BY AUTOMATED COUNT: 55.5 FL (ref 35.9–50)
FERRITIN SERPL-MCNC: 4.2 NG/ML (ref 10–291)
HCT VFR BLD AUTO: 33.4 % (ref 37–47)
HGB BLD-MCNC: 10.1 G/DL (ref 12–16)
IMM GRANULOCYTES # BLD AUTO: 0.01 K/UL (ref 0–0.11)
IMM GRANULOCYTES NFR BLD AUTO: 0.2 % (ref 0–0.9)
IRON SATN MFR SERPL: 6 % (ref 15–55)
IRON SERPL-MCNC: 30 UG/DL (ref 40–170)
LYMPHOCYTES # BLD AUTO: 1.12 K/UL (ref 1–4.8)
LYMPHOCYTES NFR BLD: 27.3 % (ref 22–41)
MCH RBC QN AUTO: 25 PG (ref 27–33)
MCHC RBC AUTO-ENTMCNC: 30.2 G/DL (ref 33.6–35)
MCV RBC AUTO: 82.7 FL (ref 81.4–97.8)
MONOCYTES # BLD AUTO: 0.39 K/UL (ref 0–0.85)
MONOCYTES NFR BLD AUTO: 9.5 % (ref 0–13.4)
NEUTROPHILS # BLD AUTO: 2.43 K/UL (ref 2–7.15)
NEUTROPHILS NFR BLD: 59.1 % (ref 44–72)
NRBC # BLD AUTO: 0 K/UL
NRBC BLD-RTO: 0 /100 WBC
PLATELET # BLD AUTO: 277 K/UL (ref 164–446)
PMV BLD AUTO: 9.8 FL (ref 9–12.9)
RBC # BLD AUTO: 4.04 M/UL (ref 4.2–5.4)
TIBC SERPL-MCNC: 473 UG/DL (ref 250–450)
WBC # BLD AUTO: 4.1 K/UL (ref 4.8–10.8)

## 2018-08-04 PROCEDURE — 80076 HEPATIC FUNCTION PANEL: CPT

## 2018-08-04 PROCEDURE — 36415 COLL VENOUS BLD VENIPUNCTURE: CPT

## 2018-08-04 PROCEDURE — 83550 IRON BINDING TEST: CPT

## 2018-08-04 PROCEDURE — 82955 ASSAY OF G6PD ENZYME: CPT

## 2018-08-04 PROCEDURE — 85025 COMPLETE CBC W/AUTO DIFF WBC: CPT

## 2018-08-04 PROCEDURE — 83540 ASSAY OF IRON: CPT

## 2018-08-04 PROCEDURE — 82728 ASSAY OF FERRITIN: CPT

## 2018-08-08 DIAGNOSIS — M06.9 RHEUMATOID ARTHRITIS, INVOLVING UNSPECIFIED SITE, UNSPECIFIED RHEUMATOID FACTOR PRESENCE: ICD-10-CM

## 2018-08-08 LAB
ALBUMIN SERPL BCP-MCNC: 4.4 G/DL (ref 3.2–4.9)
ALP SERPL-CCNC: 31 U/L (ref 30–99)
ALT SERPL-CCNC: 14 U/L (ref 2–50)
AST SERPL-CCNC: 21 U/L (ref 12–45)
BILIRUB CONJ SERPL-MCNC: <0.1 MG/DL (ref 0.1–0.5)
BILIRUB INDIRECT SERPL-MCNC: NORMAL MG/DL (ref 0–1)
BILIRUB SERPL-MCNC: 0.4 MG/DL (ref 0.1–1.5)
G6PD RBC-CCNC: 16 U/G HB (ref 9.9–16.6)
PROT SERPL-MCNC: 6.3 G/DL (ref 6–8.2)

## 2018-08-08 RX ORDER — SULFASALAZINE 500 MG/1
1000 TABLET ORAL 2 TIMES DAILY
Qty: 120 TAB | Refills: 0 | Status: SHIPPED | OUTPATIENT
Start: 2018-08-08 | End: 2018-08-16 | Stop reason: SDUPTHER

## 2018-08-16 ENCOUNTER — OFFICE VISIT (OUTPATIENT)
Dept: RHEUMATOLOGY | Facility: PHYSICIAN GROUP | Age: 38
End: 2018-08-16
Payer: COMMERCIAL

## 2018-08-16 VITALS
WEIGHT: 118 LBS | HEART RATE: 64 BPM | TEMPERATURE: 98.6 F | OXYGEN SATURATION: 97 % | BODY MASS INDEX: 19.64 KG/M2 | RESPIRATION RATE: 16 BRPM | DIASTOLIC BLOOD PRESSURE: 70 MMHG | SYSTOLIC BLOOD PRESSURE: 92 MMHG

## 2018-08-16 DIAGNOSIS — M06.9 RHEUMATOID ARTHRITIS, INVOLVING UNSPECIFIED SITE, UNSPECIFIED RHEUMATOID FACTOR PRESENCE: ICD-10-CM

## 2018-08-16 DIAGNOSIS — D64.9 ANEMIA, UNSPECIFIED TYPE: ICD-10-CM

## 2018-08-16 DIAGNOSIS — Z79.622 LONG-TERM CURRENT USE OF TOFACITINIB: ICD-10-CM

## 2018-08-16 DIAGNOSIS — M06.09 RHEUMATOID ARTHRITIS OF MULTIPLE SITES WITH NEGATIVE RHEUMATOID FACTOR (HCC): ICD-10-CM

## 2018-08-16 PROCEDURE — 99214 OFFICE O/P EST MOD 30 MIN: CPT | Performed by: INTERNAL MEDICINE

## 2018-08-16 RX ORDER — SULFASALAZINE 500 MG/1
1500 TABLET ORAL 2 TIMES DAILY
Qty: 180 TAB | Refills: 2 | Status: SHIPPED | OUTPATIENT
Start: 2018-08-16 | End: 2018-12-11 | Stop reason: SDUPTHER

## 2018-08-16 ASSESSMENT — ENCOUNTER SYMPTOMS
HEARTBURN: 0
CHILLS: 0
CONSTIPATION: 1
FEVER: 0

## 2018-08-16 NOTE — LETTER
"         [facility]   [address], [address]  [place] [address]  Phone: [phone]  Fax: [phone]              Encounter Date: 8/16/2018    Dear Dr. Guy ref. provider found,    It was a pleasure seeing your patient, Sunshine Miller, on 8/16/2018. Diagnoses of Rheumatoid arthritis of multiple sites with negative rheumatoid factor (HCC), Rheumatoid arthritis, involving unspecified site, unspecified rheumatoid factor presence (HCC), Long-term current use of tofacitinib, and Anemia, unspecified type were pertinent to this visit.     Please find attached progress note which includes the history I obtained from Ms. Miller, my physical examination findings, my impression and recommendations.      Once again, it was a pleasure participating in your patient's care.  Please feel free to contact me if you have any questions or if I can be of any further assistance to your patients.      Sincerely,    Linda [place] M.D.  Electronically Signed          PROGRESS NOTE:  Established patient    Ms. Sunshine Miller is here for follow-up forCCP positive Rheumatoid Arthritis.    CCP positive rheumatoid Arthritis  Arthritis is OK.  everything seems stable.  She is noticing more than usual achiness in her hands but nothing that lasts more than a day.  The hands feels best in the morning.  Last 3 weeks she has knee problems.  SHe notes \"crunching\" in her kness and will have pain and difficulty walking.  Pain is worse with extension.  Pain is aggravated with overuse.  She has been on sulfasalazine 1000 mg BID x 6 months.      Current Medications  tocilizumab 162 mg weekly Q Wednesday  Sulfasalazine 1000 mg BID      Hemorrhoids and chronic constipation  Last fall colon was inflamed  Since her colonoscopy she has had chronic constipation  She has not had a bleed for few months  Did see GI and recommended colonoscopy  Labs done for GI noted iron deficiency    RHEUM HISTORY  Ms. Sunshine Miller presents with a " history of rheumatoid arthritis followed by local Rheumatologist, Dr. Malhotra.     Our patient is CCP positive with Right wrist end stage erosive disease with joint space loss ands econdary OA and left IP thumb that has previous trauma.  SHe is RF negative and ANTHONY/SSA/SSB negative.       She was diagnosed with RA in [place].  She presented with  In 2006 she following a miscarriage she started to feel tightness in her neck.  When she became pregnancy again her symptoms resolved.  However following delivery she had recurrence of her symptoms.  She as finally diagnosed in 5/2009.  She did also have swelling in her feet, neck and thumb as well as her feet.  She also has had symptoms in her shoulders.  She denies sicca symptoms.  She has been told she has dry eyes.       When she moved to [place] she was followed by Denise Stewart then Brenna Malhotra     Overall lately she has been well maintained with actemra and sulfasalazine .  She has occasional pain in her wrists.          She has had intermittent rash since 2009 with appearance on her dorsum of the hands and feet that sometimes could turn into a fluid filled lesions and papules.  She manages this with clobetasol.      Previous Medication:  Never been on methotrexate  Tried plaquenil which did not do well  On sulfasalazine since day 1  Humira (didn't work and awful side effects)  remicade  Enbrel (first biologic and after pregnancy when she revisited this it did not work)  Orencia (did not work)  rituxan (did not work)        She notes that there is some mild morning stiffness lasting 10-20 minutes with's a few swollen joints and occasional stiff hands. She sometimes has hair loss (on Humira) and sometimes has photosensitivity but otherwise denies any eye inflammation or eye pain. She admits to a skin rash but also denies pleuritic chest pain. She admits to Raynauds. She has chills and night sweats but no fevers. She admits to night sweats that is chronic. She admits to  constipation as well as blood in stool secondary to hemorrhoids. She has regular menstrual periods and her last period was 12/28/2017. She admits to neck size but otherwise no swollen glands or thyroid problems. She drinks 3 glasses of alcohol a week.           Pertinent serologies  Quantiferon negative 8/18/2016  HLA B26 negative  8/18/2016  Uric acid was 30 mg/dL  8/18/2016  SSA negative   8/18/2016  AAB negative 8/18/2016 lab lisa  Rheumatoid arthritis factor 13.7 (0-13.9) 8/18/2016           Results for CIERRA PEREZ (MRN [id number]) as of 1/27/2018 20:26    Ref. Range 1/25/2014 09:41   Hep B Surface Antibody Quant Latest Units: IU/L >1000.00   Hepatitis B Surface Antigen Latest Ref Range: Negative  Negative   Hepatitis B Ccore Ab, Total Latest Ref Range: Negative  Negative      Results for CIERRA PEREZ (MRN [id number]) as of 1/27/2018 20:26    Ref. Range 4/6/2013 11:51   Sjogren'S Anti-Ss-A Latest Ref Range: 0 - 40 AU/mL 1   Sjogren'S Anti-Ss-B Latest Ref Range: 0 - 40 AU/mL 0   Results for CIERRA PEREZ (MRN [id number]) as of 1/27/2018 20:26    Ref. Range 4/6/2013 11:51   Anti-Cariolipin Ab Igg Latest Ref Range: 0 - 14 GPL 3   Anti-Cardiolipin Ab Iga Latest Ref Range: 0 - 11 APL 4   Anti-Cardiolipin Ab Igm Latest Ref Range: 0 - 12 MPL 3   Beta-2 Glycoprotein I Ab Igg Latest Ref Range: 0 - 20 SGU 7   Beta-2 Glycoprotein I Igm Latest Ref Range: 0 - 20 SMU 4         Single view of both hands 8/18/2016  Severe JSN, subchondral sclerosis and hypertrophic spurring of the radial carpal, intercarpal and carpal metacarpal joints of the right wrist.     Family history: Mother and grandmother had psoriasis, father had Lyme disease, mother had heart disease and colitis and high blood pressure.     Past medical history: Rheumatoid arthritis, lupus, knee lumpectomy.     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     • Tocilizumab (ACTEMRA) 162 MG/0.9ML Solution Prefilled Syringe Inject 1 Syringe as instructed every 7 days. 1 Syringe 0   • Diclofenac Sodium 1 % Gel Apply 1 Application to skin as directed 3 times a day as needed. We have a concern for GI bleeding 100 g 1     No current facility-administered medications on file prior to visit.          Review of Systems   Constitutional: Negative for chills and fever.   Gastrointestinal: Positive for constipation. Negative for heartburn.   Musculoskeletal: Positive for joint pain.   Skin: Negative for rash.     Vitals:    08/16/18 1257   BP: (!) 92/70   Pulse: 64   Resp: 16   Temp: 37 °C (98.6 °F)   SpO2: 97%   Weight: 53.5 kg (118 lb)       Physical Exam   Constitutional: She is oriented to person, place, and time and well-developed, well-nourished, and in no distress. No distress.   HENT:   Head: Normocephalic and atraumatic.   Right Ear: External ear normal.   Left Ear: External ear normal.   Eyes: Conjunctivae are normal. Right eye exhibits no discharge. Left eye exhibits no discharge. No scleral icterus.   Cardiovascular: Normal rate, regular rhythm and normal heart sounds.    Pulmonary/Chest: Effort normal. No stridor. No respiratory distress. She has no wheezes. She has no rales.   Abdominal: Soft. She exhibits no distension. There is no tenderness.   No hepatomegaly   Musculoskeletal: She exhibits no edema.   No synovitis of the PIP, DIP, MCP however right wrist has severely limited ROM and fused with tenderness to palpation.  Elbows have good ROM bilateral.  Left wrist of good ROM bilateral.  Mild fluid but no tenderness on the right knee.  No swelling in the left knee.   Neurological: She is alert and oriented to person, place, and time. Gait normal. GCS score is 15.   Skin: Skin is warm and dry. She is not diaphoretic. No erythema.      Psychiatric: Mood, memory, affect and judgment normal.           Lab Results   Component Value Date/Time    QNTTBGOLD Negative 02/05/2018 12:56 PM     Lab Results   Component Value Date/Time    HEPBCORTOT Negative 01/25/2014 09:41 AM    HEPBSAG Negative 01/25/2014 09:41 AM     Lab Results   Component Value Date/Time    HEPCAB Negative 02/05/2018 12:56 PM     Lab Results   Component Value Date/Time    SODIUM 136 06/04/2018 09:50 AM    POTASSIUM 4.0 06/04/2018 09:50 AM    CHLORIDE 105 06/04/2018 09:50 AM    CO2 24 06/04/2018 09:50 AM    GLUCOSE 88 06/04/2018 09:50 AM    BUN 20 06/04/2018 09:50 AM    CREATININE 0.98 06/04/2018 09:50 AM      Lab Results   Component Value Date/Time    WBC 4.1 (L) 08/04/2018 08:26 AM    RBC 4.04 (L) 08/04/2018 08:26 AM    HEMOGLOBIN 10.1 (L) 08/04/2018 08:26 AM    HEMATOCRIT 33.4 (L) 08/04/2018 08:26 AM    MCV 82.7 08/04/2018 08:26 AM    MCH 25.0 (L) 08/04/2018 08:26 AM    MCHC 30.2 (L) 08/04/2018 08:26 AM    MPV 9.8 08/04/2018 08:26 AM    NEUTSPOLYS 59.10 08/04/2018 08:26 AM    LYMPHOCYTES 27.30 08/04/2018 08:26 AM    MONOCYTES 9.50 08/04/2018 08:26 AM    EOSINOPHILS 2.90 08/04/2018 08:26 AM    BASOPHILS 1.00 08/04/2018 08:26 AM    HYPOCHROMIA 1+ 01/25/2014 09:41 AM      Lab Results   Component Value Date/Time    CALCIUM 9.5 06/04/2018 09:50 AM    ASTSGOT 21 08/04/2018 08:26 AM    ALTSGPT 14 08/04/2018 08:26 AM    ALKPHOSPHAT 31 08/04/2018 08:26 AM    TBILIRUBIN 0.4 08/04/2018 08:26 AM    ALBUMIN 4.4 08/04/2018 08:26 AM    TOTPROTEIN 6.3 08/04/2018 08:26 AM     Lab Results   Component Value Date/Time    ANTINUCAB None Detected 02/05/2018 12:56 PM     Lab Results   Component Value Date/Time    SEDRATEWES 1 05/02/2018 09:51 AM    CREACTPROT 0.02 05/02/2018 09:51 AM     Lab Results   Component Value Date/Time    VIVIAN 35.3 04/06/2013 11:51 AM    DRVVTINTERP Not Present 04/06/2013 11:51 AM    DRVVTINTERP Negative 04/06/2013 11:51 AM     Lab Results   Component Value Date/Time     IGGCARDIOLI 3 04/06/2013 11:51 AM    IGMCARDIOLI 3 04/06/2013 11:51 AM    IGACARDIOLI 4 04/06/2013 11:51 AM     Lab Results   Component Value Date/Time    ANTIDNADS None Detected 02/05/2018 12:56 PM     Lab Results   Component Value Date/Time    ANTIDNADS None Detected 02/05/2018 12:56 PM    ANTISSASJ 1 04/06/2013 11:51 AM    ANTISSBSJ 0 04/06/2013 11:51 AM     No results found for: COLORURINE, SPECGRAVITY, PHURINE, GLUCOSEUR, KETONES, PROTEINURIN  No results found for: TOTPROTUR, TOTALVOLUME, [id number]  No results found for: SSA60, SSA52  No results found for: HBA1C  Lab Results   Component Value Date/Time    CPKTOTAL 133 02/26/2018 09:59 AM     Lab Results   Component Value Date/Time    G6PD 16.0 08/04/2018 08:25 AM     No results found for: [id number]  No results found for: ACESERUM  Lab Results   Component Value Date/Time    25HYDROXY 32 09/02/2017 10:24 AM     No results found for: TSH, FREEDIR  Lab Results   Component Value Date/Time    TSHULTRASEN 1.320 09/02/2017 10:24 AM     No results found for: MICROSOMALA, ANTITHYROGL  No results found for: IGGLYMEABS  No results found for: ANTIMITOCHO, FACTIN  No results found for: IGA, TTRANSIGA, ENDOIGA  No results found for: FLTYPE, CRYSTALSBDF  No results found for: ISTATICAL  No results found for: ISTATCREAT  No results found for: CTELOPEP  No results found for: GBMABG  No results found for: PTHINTACT      Assessment and Plan      CCP positive rheumatoid arthritis with end stage disease in the right wrist.      She has been well maintained on Actemra 162 mg weekly and sulfasalazine 1000 mg BID.   Recently started to have pain in the knee which is aggravated with use so possibly mechanical.  However I am concern for possible low grade disease activtiy since her RA has been difficult to control we will increase sulfasalazine back to 1500 mg BID.     For knee pain will obtain knee xray      GI bleed causing anemia>  Patient will return to GI      Encounter high risk  medication  Check lipid panel in next visit  Check CBC, CMP also in next visit  Labs today appear stable      1. Rheumatoid arthritis of multiple sites with negative rheumatoid factor (HCC)  DX-KNEE 3 VIEWS RIGHT   2. Rheumatoid arthritis, involving unspecified site, unspecified rheumatoid factor presence (HCC)  sulfaSALAzine (AZULFIDINE) 500 MG Tab   3. Long-term current use of tofacitinib     4. Anemia, unspecified type       Return in about 3 months (around 11/16/2018).

## 2018-08-16 NOTE — PROGRESS NOTES
"Established patient    Ms. Sunshine Miller is here for follow-up forCCP positive Rheumatoid Arthritis.    CCP positive rheumatoid Arthritis  Arthritis is OK.  everything seems stable.  She is noticing more than usual achiness in her hands but nothing that lasts more than a day.  The hands feels best in the morning.  Last 3 weeks she has knee problems.  SHe notes \"crunching\" in her kness and will have pain and difficulty walking.  Pain is worse with extension.  Pain is aggravated with overuse.  She has been on sulfasalazine 1000 mg BID x 6 months.      Current Medications  tocilizumab 162 mg weekly Q Wednesday  Sulfasalazine 1000 mg BID      Hemorrhoids and chronic constipation  Last fall colon was inflamed  Since her colonoscopy she has had chronic constipation  She has not had a bleed for few months  Did see GI and recommended colonoscopy  Labs done for GI noted iron deficiency    RHEUM HISTORY  Ms. Sunshine Miller presents with a history of rheumatoid arthritis followed by local Rheumatologist, Dr. Malhotra.     Our patient is CCP positive with Right wrist end stage erosive disease with joint space loss ands econdary OA and left IP thumb that has previous trauma.  SHe is RF negative and ANTHONY/SSA/SSB negative.       She was diagnosed with RA in [place].  She presented with  In 2006 she following a miscarriage she started to feel tightness in her neck.  When she became pregnancy again her symptoms resolved.  However following delivery she had recurrence of her symptoms.  She as finally diagnosed in 5/2009.  She did also have swelling in her feet, neck and thumb as well as her feet.  She also has had symptoms in her shoulders.  She denies sicca symptoms.  She has been told she has dry eyes.       When she moved to [place] she was followed by Denise Stewart then Brenna Malhotra     Overall lately she has been well maintained with actemra and sulfasalazine .  She has occasional pain in her wrists.          She has had " intermittent rash since 2009 with appearance on her dorsum of the hands and feet that sometimes could turn into a fluid filled lesions and papules.  She manages this with clobetasol.      Previous Medication:  Never been on methotrexate  Tried plaquenil which did not do well  On sulfasalazine since day 1  Humira (didn't work and awful side effects)  remicade  Enbrel (first biologic and after pregnancy when she revisited this it did not work)  Orencia (did not work)  rituxan (did not work)        She notes that there is some mild morning stiffness lasting 10-20 minutes with's a few swollen joints and occasional stiff hands. She sometimes has hair loss (on Humira) and sometimes has photosensitivity but otherwise denies any eye inflammation or eye pain. She admits to a skin rash but also denies pleuritic chest pain. She admits to Raynauds. She has chills and night sweats but no fevers. She admits to night sweats that is chronic. She admits to constipation as well as blood in stool secondary to hemorrhoids. She has regular menstrual periods and her last period was 12/28/2017. She admits to neck size but otherwise no swollen glands or thyroid problems. She drinks 3 glasses of alcohol a week.           Pertinent serologies  Quantiferon negative 8/18/2016  HLA B26 negative  8/18/2016  Uric acid was 30 mg/dL  8/18/2016  SSA negative   8/18/2016  AAB negative 8/18/2016 lab lisa  Rheumatoid arthritis factor 13.7 (0-13.9) 8/18/2016           Results for CIERRA PEREZ (MRN [id number]) as of 1/27/2018 20:26    Ref. Range 1/25/2014 09:41   Hep B Surface Antibody Quant Latest Units: IU/L >1000.00   Hepatitis B Surface Antigen Latest Ref Range: Negative  Negative   Hepatitis B Ccore Ab, Total Latest Ref Range: Negative  Negative      Results for CIERRA PEREZ (MRN [id number]) as of 1/27/2018 20:26    Ref. Range 4/6/2013 11:51   Sjogren'S Anti-Ss-A Latest Ref Range: 0 - 40 AU/mL 1   Sjogren'S Anti-Ss-B Latest Ref Range: 0 - 40 AU/mL 0    Results for CIERRA PEREZ (MRN [id number]) as of 1/27/2018 20:26    Ref. Range 4/6/2013 11:51   Anti-Cariolipin Ab Igg Latest Ref Range: 0 - 14 GPL 3   Anti-Cardiolipin Ab Iga Latest Ref Range: 0 - 11 APL 4   Anti-Cardiolipin Ab Igm Latest Ref Range: 0 - 12 MPL 3   Beta-2 Glycoprotein I Ab Igg Latest Ref Range: 0 - 20 SGU 7   Beta-2 Glycoprotein I Igm Latest Ref Range: 0 - 20 SMU 4         Single view of both hands 8/18/2016  Severe JSN, subchondral sclerosis and hypertrophic spurring of the radial carpal, intercarpal and carpal metacarpal joints of the right wrist.     Family history: Mother and grandmother had psoriasis, father had Lyme disease, mother had heart disease and colitis and high blood pressure.     Past medical history: Rheumatoid arthritis, lupus, knee lumpectomy.     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     • Tocilizumab (ACTEMRA) 162 MG/0.9ML Solution Prefilled Syringe Inject 1 Syringe as instructed every 7 days. 1 Syringe 0   • Diclofenac Sodium 1 % Gel Apply 1 Application to skin as directed 3 times a day as needed. We have a concern for GI bleeding 100 g 1     No current facility-administered medications on file prior to visit.          Review of Systems   Constitutional: Negative for chills and fever.   Gastrointestinal: Positive for constipation. Negative for heartburn.   Musculoskeletal: Positive for joint pain.   Skin: Negative for rash.     Vitals:    08/16/18 1257   BP: (!) 92/70   Pulse: 64   Resp: 16   Temp: 37 °C (98.6 °F)   SpO2: 97%   Weight: 53.5 kg (118 lb)       Physical Exam   Constitutional: She is oriented to person, place, and time and well-developed, well-nourished, and in no distress. No distress.   HENT:   Head: Normocephalic and  atraumatic.   Right Ear: External ear normal.   Left Ear: External ear normal.   Eyes: Conjunctivae are normal. Right eye exhibits no discharge. Left eye exhibits no discharge. No scleral icterus.   Cardiovascular: Normal rate, regular rhythm and normal heart sounds.    Pulmonary/Chest: Effort normal. No stridor. No respiratory distress. She has no wheezes. She has no rales.   Abdominal: Soft. She exhibits no distension. There is no tenderness.   No hepatomegaly   Musculoskeletal: She exhibits no edema.   No synovitis of the PIP, DIP, MCP however right wrist has severely limited ROM and fused with tenderness to palpation.  Elbows have good ROM bilateral.  Left wrist of good ROM bilateral.  Mild fluid but no tenderness on the right knee.  No swelling in the left knee.   Neurological: She is alert and oriented to person, place, and time. Gait normal. GCS score is 15.   Skin: Skin is warm and dry. She is not diaphoretic. No erythema.   Psychiatric: Mood, memory, affect and judgment normal.           Lab Results   Component Value Date/Time    QNTTBGOLD Negative 02/05/2018 12:56 PM     Lab Results   Component Value Date/Time    HEPBCORTOT Negative 01/25/2014 09:41 AM    HEPBSAG Negative 01/25/2014 09:41 AM     Lab Results   Component Value Date/Time    HEPCAB Negative 02/05/2018 12:56 PM     Lab Results   Component Value Date/Time    SODIUM 136 06/04/2018 09:50 AM    POTASSIUM 4.0 06/04/2018 09:50 AM    CHLORIDE 105 06/04/2018 09:50 AM    CO2 24 06/04/2018 09:50 AM    GLUCOSE 88 06/04/2018 09:50 AM    BUN 20 06/04/2018 09:50 AM    CREATININE 0.98 06/04/2018 09:50 AM      Lab Results   Component Value Date/Time    WBC 4.1 (L) 08/04/2018 08:26 AM    RBC 4.04 (L) 08/04/2018 08:26 AM    HEMOGLOBIN 10.1 (L) 08/04/2018 08:26 AM    HEMATOCRIT 33.4 (L) 08/04/2018 08:26 AM    MCV 82.7 08/04/2018 08:26 AM    MCH 25.0 (L) 08/04/2018 08:26 AM    MCHC 30.2 (L) 08/04/2018 08:26 AM    MPV 9.8 08/04/2018 08:26 AM    NEUTSPOLYS 59.10  08/04/2018 08:26 AM    LYMPHOCYTES 27.30 08/04/2018 08:26 AM    MONOCYTES 9.50 08/04/2018 08:26 AM    EOSINOPHILS 2.90 08/04/2018 08:26 AM    BASOPHILS 1.00 08/04/2018 08:26 AM    HYPOCHROMIA 1+ 01/25/2014 09:41 AM      Lab Results   Component Value Date/Time    CALCIUM 9.5 06/04/2018 09:50 AM    ASTSGOT 21 08/04/2018 08:26 AM    ALTSGPT 14 08/04/2018 08:26 AM    ALKPHOSPHAT 31 08/04/2018 08:26 AM    TBILIRUBIN 0.4 08/04/2018 08:26 AM    ALBUMIN 4.4 08/04/2018 08:26 AM    TOTPROTEIN 6.3 08/04/2018 08:26 AM     Lab Results   Component Value Date/Time    ANTINUCAB None Detected 02/05/2018 12:56 PM     Lab Results   Component Value Date/Time    SEDRATEWES 1 05/02/2018 09:51 AM    CREACTPROT 0.02 05/02/2018 09:51 AM     Lab Results   Component Value Date/Time    RUSSELVIPER 35.3 04/06/2013 11:51 AM    DRVVTINTERP Not Present 04/06/2013 11:51 AM    DRVVTINTERP Negative 04/06/2013 11:51 AM     Lab Results   Component Value Date/Time    IGGCARDIOLI 3 04/06/2013 11:51 AM    IGMCARDIOLI 3 04/06/2013 11:51 AM    IGACARDIOLI 4 04/06/2013 11:51 AM     Lab Results   Component Value Date/Time    ANTIDNADS None Detected 02/05/2018 12:56 PM     Lab Results   Component Value Date/Time    ANTIDNADS None Detected 02/05/2018 12:56 PM    ANTISSASJ 1 04/06/2013 11:51 AM    ANTISSBSJ 0 04/06/2013 11:51 AM     No results found for: COLORURINE, SPECGRAVITY, PHURINE, GLUCOSEUR, KETONES, PROTEINURIN  No results found for: TOTPROTUR, TOTALVOLUME, [id number]  No results found for: SSA60, SSA52  No results found for: HBA1C  Lab Results   Component Value Date/Time    CPKTOTAL 133 02/26/2018 09:59 AM     Lab Results   Component Value Date/Time    G6PD 16.0 08/04/2018 08:25 AM     No results found for: [id number]  No results found for: ACESERUM  Lab Results   Component Value Date/Time    25HYDROXY 32 09/02/2017 10:24 AM     No results found for: TSH, FREEDIR  Lab Results   Component Value Date/Time    TSHULTRASEN 1.320 09/02/2017 10:24 AM     No  results found for: MICROSOMALA, ANTITHYROGL  No results found for: IGGLYMEABS  No results found for: ANTIMITOCHO, FACTIN  No results found for: IGA, TTRANSIGA, ENDOIGA  No results found for: FLTYPE, CRYSTALSBDF  No results found for: ISTATICAL  No results found for: ISTATCREAT  No results found for: CTELOPEP  No results found for: GBMABG  No results found for: PTHINTACT      Assessment and Plan      CCP positive rheumatoid arthritis with end stage disease in the right wrist.      She has been well maintained on Actemra 162 mg weekly and sulfasalazine 1000 mg BID.  Recently started to have pain in the knee which is aggravated with use so possibly mechanical.  However I am concern for possible low grade disease activtiy since her RA has been difficult to control we will increase sulfasalazine back to 1500 mg BID.     For knee pain will obtain knee xray      GI bleed causing anemia>  Patient will return to GI      Encounter high risk medication  Check lipid panel in next visit  Check CBC, CMP also in next visit  Labs today appear stable      1. Rheumatoid arthritis of multiple sites with negative rheumatoid factor (HCC)  DX-KNEE 3 VIEWS RIGHT   2. Rheumatoid arthritis, involving unspecified site, unspecified rheumatoid factor presence (HCC)  sulfaSALAzine (AZULFIDINE) 500 MG Tab   3. Long-term current use of tofacitinib     4. Anemia, unspecified type       Return in about 3 months (around 11/16/2018).

## 2018-08-18 ENCOUNTER — HOSPITAL ENCOUNTER (OUTPATIENT)
Dept: LAB | Facility: MEDICAL CENTER | Age: 38
End: 2018-08-18
Attending: INTERNAL MEDICINE
Payer: COMMERCIAL

## 2018-08-18 DIAGNOSIS — M06.09 RHEUMATOID ARTHRITIS OF MULTIPLE SITES WITH NEGATIVE RHEUMATOID FACTOR (HCC): ICD-10-CM

## 2018-08-18 DIAGNOSIS — K64.9 HEMORRHOIDS, UNSPECIFIED HEMORRHOID TYPE: ICD-10-CM

## 2018-08-18 LAB
ALBUMIN SERPL BCP-MCNC: 4.2 G/DL (ref 3.2–4.9)
ALBUMIN/GLOB SERPL: 1.9 G/DL
ALP SERPL-CCNC: 35 U/L (ref 30–99)
ALT SERPL-CCNC: 18 U/L (ref 2–50)
ANION GAP SERPL CALC-SCNC: 9 MMOL/L (ref 0–11.9)
AST SERPL-CCNC: 22 U/L (ref 12–45)
BASOPHILS # BLD AUTO: 0.9 % (ref 0–1.8)
BASOPHILS # BLD: 0.05 K/UL (ref 0–0.12)
BILIRUB SERPL-MCNC: 0.6 MG/DL (ref 0.1–1.5)
BUN SERPL-MCNC: 15 MG/DL (ref 8–22)
CALCIUM SERPL-MCNC: 9.2 MG/DL (ref 8.5–10.5)
CHLORIDE SERPL-SCNC: 106 MMOL/L (ref 96–112)
CO2 SERPL-SCNC: 23 MMOL/L (ref 20–33)
CREAT SERPL-MCNC: 0.85 MG/DL (ref 0.5–1.4)
CRP SERPL HS-MCNC: 0.02 MG/DL (ref 0–0.75)
EOSINOPHIL # BLD AUTO: 0.09 K/UL (ref 0–0.51)
EOSINOPHIL NFR BLD: 1.6 % (ref 0–6.9)
ERYTHROCYTE [DISTWIDTH] IN BLOOD BY AUTOMATED COUNT: 54.1 FL (ref 35.9–50)
ERYTHROCYTE [SEDIMENTATION RATE] IN BLOOD BY WESTERGREN METHOD: 4 MM/HOUR (ref 0–20)
GLOBULIN SER CALC-MCNC: 2.2 G/DL (ref 1.9–3.5)
GLUCOSE SERPL-MCNC: 73 MG/DL (ref 65–99)
HCT VFR BLD AUTO: 33.9 % (ref 37–47)
HGB BLD-MCNC: 10.5 G/DL (ref 12–16)
IMM GRANULOCYTES # BLD AUTO: 0.01 K/UL (ref 0–0.11)
IMM GRANULOCYTES NFR BLD AUTO: 0.2 % (ref 0–0.9)
LYMPHOCYTES # BLD AUTO: 1.47 K/UL (ref 1–4.8)
LYMPHOCYTES NFR BLD: 26.7 % (ref 22–41)
MCH RBC QN AUTO: 25.7 PG (ref 27–33)
MCHC RBC AUTO-ENTMCNC: 31 G/DL (ref 33.6–35)
MCV RBC AUTO: 82.9 FL (ref 81.4–97.8)
MONOCYTES # BLD AUTO: 0.52 K/UL (ref 0–0.85)
MONOCYTES NFR BLD AUTO: 9.4 % (ref 0–13.4)
NEUTROPHILS # BLD AUTO: 3.37 K/UL (ref 2–7.15)
NEUTROPHILS NFR BLD: 61.2 % (ref 44–72)
NRBC # BLD AUTO: 0 K/UL
NRBC BLD-RTO: 0 /100 WBC
PLATELET # BLD AUTO: 299 K/UL (ref 164–446)
PMV BLD AUTO: 10.2 FL (ref 9–12.9)
POTASSIUM SERPL-SCNC: 4.3 MMOL/L (ref 3.6–5.5)
PROT SERPL-MCNC: 6.4 G/DL (ref 6–8.2)
RBC # BLD AUTO: 4.09 M/UL (ref 4.2–5.4)
SODIUM SERPL-SCNC: 138 MMOL/L (ref 135–145)
WBC # BLD AUTO: 5.5 K/UL (ref 4.8–10.8)

## 2018-08-18 PROCEDURE — 80053 COMPREHEN METABOLIC PANEL: CPT

## 2018-08-18 PROCEDURE — 36415 COLL VENOUS BLD VENIPUNCTURE: CPT

## 2018-08-18 PROCEDURE — 86140 C-REACTIVE PROTEIN: CPT

## 2018-08-18 PROCEDURE — 85025 COMPLETE CBC W/AUTO DIFF WBC: CPT

## 2018-08-18 PROCEDURE — 85652 RBC SED RATE AUTOMATED: CPT

## 2018-10-31 DIAGNOSIS — M06.9 RHEUMATOID ARTHRITIS, INVOLVING UNSPECIFIED SITE, UNSPECIFIED RHEUMATOID FACTOR PRESENCE: ICD-10-CM

## 2018-11-01 RX ORDER — TOCILIZUMAB 180 MG/ML
INJECTION, SOLUTION SUBCUTANEOUS
Qty: 3.6 ML | Refills: 0 | OUTPATIENT
Start: 2018-11-01

## 2018-11-01 NOTE — TELEPHONE ENCOUNTER
Patient needs to contact and f/u w rheumatology office for any rheum medicine refill.    Fe Ortiz P.A.-C.

## 2018-11-01 NOTE — TELEPHONE ENCOUNTER
Was the patient seen in the last year in this department? No 1 Year     Does patient have an active prescription for medications requested? No     Received Request Via: Patient

## 2018-11-06 DIAGNOSIS — M06.9 RHEUMATOID ARTHRITIS, INVOLVING UNSPECIFIED SITE, UNSPECIFIED RHEUMATOID FACTOR PRESENCE: ICD-10-CM

## 2018-11-07 NOTE — TELEPHONE ENCOUNTER
Was the patient seen in the last year in this department? Yes 8/16/18, next 3/13/19. Labs 8/18/18.     Does patient have an active prescription for medications requested? Yes    Received Request Via: Patient    Routed to Dr. Lu for review.

## 2018-12-06 NOTE — PROGRESS NOTES
"Subjective:   CC: Sunshine Miller is a 37 y.o. female here today for establishing care. Patient was under care Kate Stacie REGALADO    Rheumatoid arthritis  Pt has known RA, was under care of Dr. Malhotra. She currently takes Tocilizumab and Sulfasalazine. Last labs march 2017, last xrays last yr.   States RA is controlled on current meds,. Joints mostly affected R wrist, neck, collar bone.     She has ROM of R wrist.        Current medicines (including changes today)  Current Outpatient Prescriptions   Medication Sig Dispense Refill   • Tocilizumab (ACTEMRA) 162 MG/0.9ML Solution Prefilled Syringe Inject 1 Each as instructed every 7 days. Indications: Moderate to Severe Rheumatoid Arthritis 4 Syringe 1   • sulfaSALAzine (AZULFIDINE) 500 MG Tab Take 3 Tabs by mouth 2 times a day. Take 3,  500 mg tabs 2 times daily 180 Tab 2   • Norethindrone Acet-Ethinyl Est (JUNEL 1.5/30) 1.5-30 MG-MCG TABS Take  by mouth.     • hydroxychloroquine (PLAQUENIL) 200 MG TABS Take 200 mg by mouth every day.     • ibuprofen (MOTRIN) 800 MG TABS Take 1 Tab by mouth every 8 hours as needed for Mild Pain. 60 Tab 3   • prenatal vit/fe fumarate/fa (PRENATAL S) 27-0.8 MG TABS Take 1 Tab by mouth every morning.       No current facility-administered medications for this visit.         Past medical, surgical, family, and social history are reviewed in Epic chart by me today.   Medications and allergies reviewed in Epic chart by me today.         ROS   No chest pain, no shortness of breath, no abdominal pain  As documented in history of present illness above     Objective:     Blood pressure 102/60, pulse 65, temperature 37.7 °C (99.8 °F), resp. rate 14, height 1.651 m (5' 5\"), weight 54.432 kg (120 lb), last menstrual period 07/23/2017, SpO2 97 %. Body mass index is 19.97 kg/(m^2).   Physical Exam:  Constitutional: Alert, oriented in no acute distress.  Psych: Eye contact is good, speech goal directed, affect calm  Lungs: Unlabored respiratory effort, " Telephone Encounter by Antonella Zamora RN, BSN at 10/23/17 11:35 AM     Author:  Antonella Zamora RN BSN Service:  (none) Author Type:  Registered Nurse     Filed:  10/23/17 11:37 AM Encounter Date:  10/23/2017 Status:  Signed     :  Antonella Zamora RN, BSN (Registered Nurse)            Spoke with patients daughter LATRICIA Barber.[SM1.1M]  Reviewed upper and lower GI symptoms/concerns with[SM1.1T] Oswaldo Newman. Oswaldo Newman states patient has no[SM1.1M] upper and lower GI symptoms.[SM1.1T]  Advised EAG is out of the office until 11/1.   Oswaldo Newman verbalized understanding.[SM1.1M]  Electronically Signed by:    Antonella Zamora RN, BSN , 10/23/2017[SM1.2T]          Revision History        User Key Date/Time User Provider Type Action    > SM1.2 10/23/17 11:37 AM Antonella Zamora RN, BSN Registered Nurse Sign     SM1.1 10/23/17 11:35 AM Antonella Zamora RN, BSN Registered Nurse     M - Manual, T - Template clear to auscultation bilaterally with good excursion, no wheez or rhonci  CV: regular rate and rhythm. No lower extremity edema  Skin: no lesions in visible areas.          Assessment and Plan:   The following treatment plan was discussed    1. Rheumatoid arthritis, involving unspecified site, unspecified rheumatoid factor presence (CMS-[facility])  -- REFERRAL TO RHEUMATOLOGY  - Tocilizumab (ACTEMRA) 162 MG/0.9ML Solution Prefilled Syringe; Inject 1 Each as instructed every 7 days. Indications: Moderate to Severe Rheumatoid Arthritis  Dispense: 4 Syringe; Refill: 1  - sulfaSALAzine (AZULFIDINE) 500 MG Tab; Take 3 Tabs by mouth 2 times a day. Take 3,  500 mg tabs 2 times daily  Dispense: 180 Tab; Refill: 2    2. Preventative health care  - CBC WITH DIFFERENTIAL; Future  - COMP METABOLIC PANEL; Future  - LIPID PROFILE; Future  - TSH WITH REFLEX TO FT4; Future  - VITAMIN D,25 HYDROXY; Future      Followup: Return in about 3 months (around 11/8/2017).         Please note that this dictation was created using voice recognition software. I have made every reasonable attempt to correct obvious errors, but I expect that there are errors of grammar and possibly content that I did not discover before finalizing the note.

## 2018-12-11 DIAGNOSIS — M06.9 RHEUMATOID ARTHRITIS, INVOLVING UNSPECIFIED SITE, UNSPECIFIED RHEUMATOID FACTOR PRESENCE: Primary | ICD-10-CM

## 2018-12-11 DIAGNOSIS — Z79.899 ON SULFASALAZINE THERAPY: ICD-10-CM

## 2018-12-11 RX ORDER — SULFASALAZINE 500 MG/1
1500 TABLET ORAL 2 TIMES DAILY
Qty: 180 TAB | Refills: 0 | Status: SHIPPED | OUTPATIENT
Start: 2018-12-11 | End: 2019-03-26 | Stop reason: SDUPTHER

## 2018-12-11 NOTE — TELEPHONE ENCOUNTER
Was the patient seen in the last year in this department? Yes  8/16/18, next 3/13/18. Labs 8/18/18.  Does patient have an active prescription for medications requested? Yes    Received Request Via: Pharmacy     Routed to Dr. Lu for review.

## 2019-01-03 DIAGNOSIS — M06.9 RHEUMATOID ARTHRITIS, INVOLVING UNSPECIFIED SITE, UNSPECIFIED RHEUMATOID FACTOR PRESENCE: ICD-10-CM

## 2019-01-03 NOTE — TELEPHONE ENCOUNTER
Was the patient seen in the last year in this department? Yes  Aug Labs    Does patient have an active prescription for medications requested? No     Received Request Via: Pharmacy     Pt scheduled mica Winkler

## 2019-01-04 RX ORDER — TOCILIZUMAB 180 MG/ML
INJECTION, SOLUTION SUBCUTANEOUS
Qty: 10.8 ML | Refills: 2 | Status: SHIPPED | OUTPATIENT
Start: 2019-01-04 | End: 2019-11-06 | Stop reason: SDUPTHER

## 2019-01-04 NOTE — TELEPHONE ENCOUNTER
Labs ordered by Dr. Lu 12/2018    tashia need fasting lipid panel as well, will add to labs    Refill approved

## 2019-01-09 DIAGNOSIS — M06.9 RHEUMATOID ARTHRITIS, INVOLVING UNSPECIFIED SITE, UNSPECIFIED RHEUMATOID FACTOR PRESENCE: ICD-10-CM

## 2019-01-09 RX ORDER — SULFASALAZINE 500 MG/1
1500 TABLET ORAL 2 TIMES DAILY
Qty: 180 TAB | Refills: 0 | Status: CANCELLED | OUTPATIENT
Start: 2019-01-09

## 2019-03-13 ENCOUNTER — OFFICE VISIT (OUTPATIENT)
Dept: RHEUMATOLOGY | Facility: MEDICAL CENTER | Age: 39
End: 2019-03-13
Payer: COMMERCIAL

## 2019-03-13 VITALS
SYSTOLIC BLOOD PRESSURE: 100 MMHG | OXYGEN SATURATION: 100 % | BODY MASS INDEX: 23.25 KG/M2 | HEART RATE: 64 BPM | DIASTOLIC BLOOD PRESSURE: 56 MMHG | HEIGHT: 62 IN | TEMPERATURE: 97.8 F | WEIGHT: 126.32 LBS

## 2019-03-13 DIAGNOSIS — Z79.899 HIGH RISK MEDICATION USE: ICD-10-CM

## 2019-03-13 DIAGNOSIS — M06.9 RHEUMATOID ARTHRITIS, INVOLVING UNSPECIFIED SITE, UNSPECIFIED RHEUMATOID FACTOR PRESENCE: Primary | ICD-10-CM

## 2019-03-13 DIAGNOSIS — D64.9 ANEMIA, UNSPECIFIED TYPE: ICD-10-CM

## 2019-03-13 DIAGNOSIS — K64.9 HEMORRHOIDS, UNSPECIFIED HEMORRHOID TYPE: ICD-10-CM

## 2019-03-13 DIAGNOSIS — Z79.899 ON SULFASALAZINE THERAPY: ICD-10-CM

## 2019-03-13 PROCEDURE — 99214 OFFICE O/P EST MOD 30 MIN: CPT | Performed by: INTERNAL MEDICINE

## 2019-03-13 RX ORDER — IBUPROFEN 200 MG
500 CAPSULE ORAL DAILY
COMMUNITY
End: 2023-01-31

## 2019-03-13 RX ORDER — CHOLECALCIFEROL (VITAMIN D3) 25 MCG
CAPSULE ORAL
COMMUNITY

## 2019-03-13 NOTE — PROGRESS NOTES
RHEUMATOLOGY CLINIC FOLLOW UP NOTE        Chief complaint: follow up        HPI:  37 y/o F with RA presents today for follow up,  she is a new patient to me, previously followed by Dr. Schaeffer, last seen 8/2018.    Remains on SSZ 1000mg BID (increased at the last visit from 1000mg BID) and Actemra 162mg weekly.  Rates her pain at a 4/10.  She did have about a month where her fingers and toes were more swollen and painful, this was in the setting of moving and having to clean a lot, as well as missing a dose of Actemra due to having a URI.  She feels now her joints are doing better.  Denies side effects from SSZ.  No current fevers or rashes.  She feels the Actemra is the first medicine to work since Enbrel.          Rheum Background:  Per Dr. 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  SHe is RF negative and ANTHONY/SSA/SSB negative.       She was diagnosed with RA in [place].  She presented with  In 2006 she following a miscarriage she started to feel tightness in her neck.  When she became pregnant again her symptoms resolved.  However following delivery she had recurrence of her symptoms.  She as finally diagnosed in 5/2009.  She did also have swelling in her feet, neck and thumb as well as her feet.  She also has had symptoms in her shoulders.  She denies sicca symptoms.  She has been told she has dry eyes.       When she moved to [place] she was followed by Denise Malhotra   She has had intermittent rash since 2009 with appearance on her dorsum of the hands and feet that sometimes could turn into a fluid filled lesions and papules.  She manages this with clobetasol.      Previous Medication:  Never been on methotrexate  Tried plaquenil which did not do well  On sulfasalazine since day 1  Humira (didn't work and awful side effects)  remicade  Enbrel (first biologic and after pregnancy when she  revisited this it did not work)  Orencia (did not work)  rituxan (did not work)         Pertinent serologies  Quantiferon negative 8/18/2016  HLA B26 negative  8/18/2016  Uric acid was 30 mg/dL  8/18/2016  SSA negative   8/18/2016\SSB negativeAAB negative 8/18/2016 lab lisa  Rheumatoid arthritis factor 13.7 (0-13.9) 8/18/2016  Cardiolipin and beta 2 glycoprotein negative    Hep B/C serologies negative 1/2014     Imaging:   Single view of both hands 8/18/2016  Severe JSN, subchondral sclerosis and hypertrophic spurring of the radial carpal, intercarpal and carpal metacarpal joints of the right wrist.     Family history: Mother and grandmother had psoriasis, father had Lyme disease, mother had heart disease and colitis and high blood pressure.     Past medical history: Rheumatoid arthritis, lupus, knee lumpectomy.     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 Authorizing Provider   calcium carbonate (OS-MARY 500) 1250 (500 Ca) MG Tab Take 500 mg by mouth every day.   Yes Physician Outpatient   Zinc Sulfate (ZINC 15 PO) Take  by mouth.   Yes Physician Outpatient   Magnesium Gluconate (MAGNESIUM 27 PO) Take  by mouth.   Yes Physician Outpatient   Cholecalciferol (VITAMIN D-3) 1000 units Cap Take  by mouth.   Yes Physician Outpatient   ACTEMRA 162 MG/0.9ML Solution Prefilled Syringe INJECT THE CONTENTS OF 1 SYRINGE (162MG) SUBCUTANEOUSLY ONE TIME EVERY 7 DAYS AS DIRECTED. 1/4/19  Yes Heidi Winkler M.D.   sulfaSALAzine (AZULFIDINE) 500 MG Tab Take 3 Tabs by mouth 2 times a day. 12/11/18  Yes Rosette Lu M.D.   Diclofenac Sodium 1 % Gel Apply 1 Application to skin as directed 3  "times a day as needed. We have a concern for GI bleeding 5/1/18   Linda Schaeffer M.D.   Probiotic Product (PROBIOTIC DAILY PO) Take  by mouth.    Physician Outpatient           Past Medical History:   Diagnosis Date   • Arthritis     rheumatoid arthritis   • Hemorrhoid 2/14/2014   • Knee mass 2/14/2014   • Rheumatoid arthritis(714.0) 2/14/2014            reports that she has never smoked. She has never used smokeless tobacco. She reports that she drinks about 2.4 oz of alcohol per week . She reports that she does not use drugs.             Physical Exam:     /56 (BP Location: Right arm, Patient Position: Sitting, BP Cuff Size: Adult)   Pulse 64   Temp 36.6 °C (97.8 °F) (Temporal)   Ht 1.575 m (5' 2\")   Wt 57.3 kg (126 lb 5.2 oz)   SpO2 100%   BMI 23.10 kg/m²         GEN: well-appearing, NAD  Head: no focal alopecia, no hair thinning  ENT: no conjunctival icterus or injection,  CV: nml S1, S2, no murmurs, RRR  Pulm: normal chest expansion, speaking in full sentences, CTAB, no wheezing   MUSCUSKELETAL:  no edema, dactylitis, entesitis     SHOULDERs: no tenderness  ELBOWS:  no tenderness no synovitis  WRISTS:   no tenderness no synovitisl          MCPS:   Right 3rd MCP with tenderness, no swelling  PIPS:    Right 3rd PIP with very mild swelling and tenderness, left 3rd PIP with tenderness without swelling  DIPS: no tenderness no synovitis  KNEES:  no tenderness no synovitis              SKIN: no rashes, skin changes, nail changes, no periungual erythema.               Labs:    Results for orders placed or performed during the hospital encounter of 08/18/18   CBC WITH DIFFERENTIAL   Result Value Ref Range    WBC 5.5 4.8 - 10.8 K/uL    RBC 4.09 (L) 4.20 - 5.40 M/uL    Hemoglobin 10.5 (L) 12.0 - 16.0 g/dL    Hematocrit 33.9 (L) 37.0 - 47.0 %    MCV 82.9 81.4 - 97.8 fL    MCH 25.7 (L) 27.0 - 33.0 pg    MCHC 31.0 (L) 33.6 - 35.0 g/dL    RDW 54.1 (H) 35.9 - 50.0 fL    Platelet Count 299 164 - 446 K/uL    MPV 10.2 9.0 " - 12.9 fL    Neutrophils-Polys 61.20 44.00 - 72.00 %    Lymphocytes 26.70 22.00 - 41.00 %    Monocytes 9.40 0.00 - 13.40 %    Eosinophils 1.60 0.00 - 6.90 %    Basophils 0.90 0.00 - 1.80 %    Immature Granulocytes 0.20 0.00 - 0.90 %    Nucleated RBC 0.00 /100 WBC    Neutrophils (Absolute) 3.37 2.00 - 7.15 K/uL    Lymphs (Absolute) 1.47 1.00 - 4.80 K/uL    Monos (Absolute) 0.52 0.00 - 0.85 K/uL    Eos (Absolute) 0.09 0.00 - 0.51 K/uL    Baso (Absolute) 0.05 0.00 - 0.12 K/uL    Immature Granulocytes (abs) 0.01 0.00 - 0.11 K/uL    NRBC (Absolute) 0.00 K/uL   COMP METABOLIC PANEL   Result Value Ref Range    Sodium 138 135 - 145 mmol/L    Potassium 4.3 3.6 - 5.5 mmol/L    Chloride 106 96 - 112 mmol/L    Co2 23 20 - 33 mmol/L    Anion Gap 9.0 0.0 - 11.9    Glucose 73 65 - 99 mg/dL    Bun 15 8 - 22 mg/dL    Creatinine 0.85 0.50 - 1.40 mg/dL    Calcium 9.2 8.5 - 10.5 mg/dL    AST(SGOT) 22 12 - 45 U/L    ALT(SGPT) 18 2 - 50 U/L    Alkaline Phosphatase 35 30 - 99 U/L    Total Bilirubin 0.6 0.1 - 1.5 mg/dL    Albumin 4.2 3.2 - 4.9 g/dL    Total Protein 6.4 6.0 - 8.2 g/dL    Globulin 2.2 1.9 - 3.5 g/dL    A-G Ratio 1.9 g/dL   WESTERGREN SED RATE   Result Value Ref Range    Sed Rate Westergren 4 0 - 20 mm/hour   CRP QUANTITIVE (NON-CARDIAC)   Result Value Ref Range    Stat C-Reactive Protein 0.02 0.00 - 0.75 mg/dL   ESTIMATED GFR   Result Value Ref Range    GFR If African American >60 >60 mL/min/1.73 m 2    GFR If Non African American >60 >60 mL/min/1.73 m 2         Impression/Plan:     1.  Rheumatoid arthritis  - CCP positive. with end stage disease in the right wrist s/p fusion    2.  High risk medication use    She has been well maintained on Actemra 162 mg weekly and sulfasalazine 1000 mg BID.  She did have episode of swelling and pain in the setting of moving and holding her Actemra due to an infection however now doing well.  We will continue to monitor if continues to flare on stable regimen.    Labs 8/2018 reviewed:  CBC  with anemia to 10.5 (baseline)  CMP normal  ESR and CRP normal    Plan:  - Continue Actemra weekly 162mg  - Continue SSZ 1000mg BID- if continues to flare may consider increasing to 1500mg BID- will refill once get lab results  - CBC, CMP, ESR and CRP Q 3 months- due now and June, standing order placed x 1 year  - Repeat lipid profile now  - May consider updating xrays at next visit      3.  Anemia  4.  Hemorrhoids - with bleeding  Had colonoscopy in past however would like to see another GI specialist, I have placed a new referral                     Patient will be in contact with me for any questions or concerns, will otherwise follow up with me as scheduled in 4 months.        Heidi Winkler MD

## 2019-03-14 ENCOUNTER — HOSPITAL ENCOUNTER (OUTPATIENT)
Dept: LAB | Facility: MEDICAL CENTER | Age: 39
End: 2019-03-14
Attending: INTERNAL MEDICINE
Payer: COMMERCIAL

## 2019-03-14 DIAGNOSIS — Z79.899 HIGH RISK MEDICATION USE: ICD-10-CM

## 2019-03-14 DIAGNOSIS — M06.9 RHEUMATOID ARTHRITIS, INVOLVING UNSPECIFIED SITE, UNSPECIFIED RHEUMATOID FACTOR PRESENCE: ICD-10-CM

## 2019-03-14 LAB
ALBUMIN SERPL BCP-MCNC: 4.4 G/DL (ref 3.2–4.9)
ALBUMIN/GLOB SERPL: 2 G/DL
ALP SERPL-CCNC: 30 U/L (ref 30–99)
ALT SERPL-CCNC: 18 U/L (ref 2–50)
ANION GAP SERPL CALC-SCNC: 7 MMOL/L (ref 0–11.9)
AST SERPL-CCNC: 22 U/L (ref 12–45)
BASOPHILS # BLD AUTO: 1.7 % (ref 0–1.8)
BASOPHILS # BLD: 0.06 K/UL (ref 0–0.12)
BILIRUB SERPL-MCNC: 0.5 MG/DL (ref 0.1–1.5)
BUN SERPL-MCNC: 12 MG/DL (ref 8–22)
CALCIUM SERPL-MCNC: 8.9 MG/DL (ref 8.5–10.5)
CHLORIDE SERPL-SCNC: 107 MMOL/L (ref 96–112)
CHOLEST SERPL-MCNC: 214 MG/DL (ref 100–199)
CO2 SERPL-SCNC: 25 MMOL/L (ref 20–33)
CREAT SERPL-MCNC: 0.87 MG/DL (ref 0.5–1.4)
CRP SERPL HS-MCNC: <0.02 MG/DL (ref 0–0.75)
EOSINOPHIL # BLD AUTO: 0.12 K/UL (ref 0–0.51)
EOSINOPHIL NFR BLD: 3.3 % (ref 0–6.9)
ERYTHROCYTE [DISTWIDTH] IN BLOOD BY AUTOMATED COUNT: 50.5 FL (ref 35.9–50)
ERYTHROCYTE [SEDIMENTATION RATE] IN BLOOD BY WESTERGREN METHOD: <1 MM/HOUR (ref 0–20)
FASTING STATUS PATIENT QL REPORTED: NORMAL
GLOBULIN SER CALC-MCNC: 2.2 G/DL (ref 1.9–3.5)
GLUCOSE SERPL-MCNC: 91 MG/DL (ref 65–99)
HCT VFR BLD AUTO: 32.9 % (ref 37–47)
HDLC SERPL-MCNC: 102 MG/DL
HGB BLD-MCNC: 10.2 G/DL (ref 12–16)
IMM GRANULOCYTES # BLD AUTO: 0.01 K/UL (ref 0–0.11)
IMM GRANULOCYTES NFR BLD AUTO: 0.3 % (ref 0–0.9)
LDLC SERPL CALC-MCNC: 100 MG/DL
LYMPHOCYTES # BLD AUTO: 1.42 K/UL (ref 1–4.8)
LYMPHOCYTES NFR BLD: 39.2 % (ref 22–41)
MCH RBC QN AUTO: 28.3 PG (ref 27–33)
MCHC RBC AUTO-ENTMCNC: 31 G/DL (ref 33.6–35)
MCV RBC AUTO: 91.1 FL (ref 81.4–97.8)
MONOCYTES # BLD AUTO: 0.38 K/UL (ref 0–0.85)
MONOCYTES NFR BLD AUTO: 10.5 % (ref 0–13.4)
NEUTROPHILS # BLD AUTO: 1.63 K/UL (ref 2–7.15)
NEUTROPHILS NFR BLD: 45 % (ref 44–72)
NRBC # BLD AUTO: 0 K/UL
NRBC BLD-RTO: 0 /100 WBC
PLATELET # BLD AUTO: 316 K/UL (ref 164–446)
PMV BLD AUTO: 9.3 FL (ref 9–12.9)
POTASSIUM SERPL-SCNC: 3.8 MMOL/L (ref 3.6–5.5)
PROT SERPL-MCNC: 6.6 G/DL (ref 6–8.2)
RBC # BLD AUTO: 3.61 M/UL (ref 4.2–5.4)
SODIUM SERPL-SCNC: 139 MMOL/L (ref 135–145)
TRIGL SERPL-MCNC: 59 MG/DL (ref 0–149)
WBC # BLD AUTO: 3.6 K/UL (ref 4.8–10.8)

## 2019-03-14 PROCEDURE — 86140 C-REACTIVE PROTEIN: CPT

## 2019-03-14 PROCEDURE — 85025 COMPLETE CBC W/AUTO DIFF WBC: CPT

## 2019-03-14 PROCEDURE — 80061 LIPID PANEL: CPT

## 2019-03-14 PROCEDURE — 85652 RBC SED RATE AUTOMATED: CPT

## 2019-03-14 PROCEDURE — 80053 COMPREHEN METABOLIC PANEL: CPT

## 2019-03-14 PROCEDURE — 36415 COLL VENOUS BLD VENIPUNCTURE: CPT

## 2019-03-26 DIAGNOSIS — M06.9 RHEUMATOID ARTHRITIS, INVOLVING UNSPECIFIED SITE, UNSPECIFIED RHEUMATOID FACTOR PRESENCE: Primary | ICD-10-CM

## 2019-03-26 RX ORDER — SULFASALAZINE 500 MG/1
1000 TABLET ORAL 2 TIMES DAILY
Qty: 180 TAB | Refills: 0 | Status: SHIPPED | OUTPATIENT
Start: 2019-03-26 | End: 2019-05-13 | Stop reason: SDUPTHER

## 2019-03-26 NOTE — TELEPHONE ENCOUNTER
Was the patient seen in the last year in this department? Yes  3/13/19, next 8/1/19. Labs 3/14/19.  Does patient have an active prescription for medications requested? Yes    Received Request Via: Pharmacy     Routed to Dr. Winkler for review

## 2019-03-26 NOTE — TELEPHONE ENCOUNTER
CBC with low WBC (3.6), has been low in past, and neutropenia to 1.6. CMP normal.    SSZ refill approved but I have sent a message to the patient asking to repeat the CBC in 1 month, this has been ordered.

## 2019-04-25 ENCOUNTER — HOSPITAL ENCOUNTER (OUTPATIENT)
Dept: LAB | Facility: MEDICAL CENTER | Age: 39
End: 2019-04-25
Attending: INTERNAL MEDICINE
Payer: COMMERCIAL

## 2019-04-25 DIAGNOSIS — M06.9 RHEUMATOID ARTHRITIS, INVOLVING UNSPECIFIED SITE, UNSPECIFIED RHEUMATOID FACTOR PRESENCE: ICD-10-CM

## 2019-04-25 LAB
BASOPHILS # BLD AUTO: 1.7 % (ref 0–1.8)
BASOPHILS # BLD: 0.07 K/UL (ref 0–0.12)
EOSINOPHIL # BLD AUTO: 0.09 K/UL (ref 0–0.51)
EOSINOPHIL NFR BLD: 2.1 % (ref 0–6.9)
ERYTHROCYTE [DISTWIDTH] IN BLOOD BY AUTOMATED COUNT: 45.1 FL (ref 35.9–50)
HCT VFR BLD AUTO: 32 % (ref 37–47)
HGB BLD-MCNC: 9.6 G/DL (ref 12–16)
IMM GRANULOCYTES # BLD AUTO: 0.01 K/UL (ref 0–0.11)
IMM GRANULOCYTES NFR BLD AUTO: 0.2 % (ref 0–0.9)
LYMPHOCYTES # BLD AUTO: 1.48 K/UL (ref 1–4.8)
LYMPHOCYTES NFR BLD: 35.2 % (ref 22–41)
MCH RBC QN AUTO: 26.7 PG (ref 27–33)
MCHC RBC AUTO-ENTMCNC: 30 G/DL (ref 33.6–35)
MCV RBC AUTO: 89.1 FL (ref 81.4–97.8)
MONOCYTES # BLD AUTO: 0.43 K/UL (ref 0–0.85)
MONOCYTES NFR BLD AUTO: 10.2 % (ref 0–13.4)
NEUTROPHILS # BLD AUTO: 2.12 K/UL (ref 2–7.15)
NEUTROPHILS NFR BLD: 50.6 % (ref 44–72)
NRBC # BLD AUTO: 0 K/UL
NRBC BLD-RTO: 0 /100 WBC
PLATELET # BLD AUTO: 301 K/UL (ref 164–446)
PMV BLD AUTO: 9.4 FL (ref 9–12.9)
RBC # BLD AUTO: 3.59 M/UL (ref 4.2–5.4)
WBC # BLD AUTO: 4.2 K/UL (ref 4.8–10.8)

## 2019-04-25 PROCEDURE — 85025 COMPLETE CBC W/AUTO DIFF WBC: CPT

## 2019-04-25 PROCEDURE — 36415 COLL VENOUS BLD VENIPUNCTURE: CPT

## 2019-08-01 ENCOUNTER — APPOINTMENT (OUTPATIENT)
Dept: RHEUMATOLOGY | Facility: MEDICAL CENTER | Age: 39
End: 2019-08-01
Payer: COMMERCIAL

## 2019-08-18 DIAGNOSIS — M06.9 RHEUMATOID ARTHRITIS, INVOLVING UNSPECIFIED SITE, UNSPECIFIED RHEUMATOID FACTOR PRESENCE: ICD-10-CM

## 2019-08-19 NOTE — TELEPHONE ENCOUNTER
Was the patient seen in the last year in this department? Yes  March labs  Does patient have an active prescription for medications requested? No     Received Request Via: Pharmacy

## 2019-08-20 ENCOUNTER — HOSPITAL ENCOUNTER (EMERGENCY)
Facility: MEDICAL CENTER | Age: 39
End: 2019-08-21
Attending: EMERGENCY MEDICINE
Payer: COMMERCIAL

## 2019-08-20 ENCOUNTER — APPOINTMENT (OUTPATIENT)
Dept: RADIOLOGY | Facility: MEDICAL CENTER | Age: 39
End: 2019-08-20
Attending: EMERGENCY MEDICINE
Payer: COMMERCIAL

## 2019-08-20 ENCOUNTER — OFFICE VISIT (OUTPATIENT)
Dept: URGENT CARE | Facility: CLINIC | Age: 39
End: 2019-08-20
Payer: COMMERCIAL

## 2019-08-20 ENCOUNTER — TELEPHONE (OUTPATIENT)
Dept: URGENT CARE | Facility: CLINIC | Age: 39
End: 2019-08-20

## 2019-08-20 VITALS
HEART RATE: 60 BPM | SYSTOLIC BLOOD PRESSURE: 120 MMHG | HEIGHT: 65 IN | DIASTOLIC BLOOD PRESSURE: 82 MMHG | WEIGHT: 123.8 LBS | TEMPERATURE: 97.5 F | OXYGEN SATURATION: 100 % | BODY MASS INDEX: 20.62 KG/M2 | RESPIRATION RATE: 16 BRPM

## 2019-08-20 DIAGNOSIS — R42 DIZZINESS: ICD-10-CM

## 2019-08-20 DIAGNOSIS — R42 VERTIGO: ICD-10-CM

## 2019-08-20 LAB
ALBUMIN SERPL BCP-MCNC: 4.3 G/DL (ref 3.2–4.9)
ALBUMIN/GLOB SERPL: 2 G/DL
ALP SERPL-CCNC: 35 U/L (ref 30–99)
ALT SERPL-CCNC: 26 U/L (ref 2–50)
ANION GAP SERPL CALC-SCNC: 7 MMOL/L (ref 0–11.9)
ANISOCYTOSIS BLD QL SMEAR: ABNORMAL
AST SERPL-CCNC: 23 U/L (ref 12–45)
BASOPHILS # BLD AUTO: 0.9 % (ref 0–1.8)
BASOPHILS # BLD: 0.06 K/UL (ref 0–0.12)
BILIRUB SERPL-MCNC: 0.2 MG/DL (ref 0.1–1.5)
BUN SERPL-MCNC: 14 MG/DL (ref 8–22)
CALCIUM SERPL-MCNC: 9.2 MG/DL (ref 8.5–10.5)
CHLORIDE SERPL-SCNC: 106 MMOL/L (ref 96–112)
CO2 SERPL-SCNC: 25 MMOL/L (ref 20–33)
COMMENT 1642: NORMAL
CREAT SERPL-MCNC: 0.76 MG/DL (ref 0.5–1.4)
EOSINOPHIL # BLD AUTO: 0.14 K/UL (ref 0–0.51)
EOSINOPHIL NFR BLD: 2.2 % (ref 0–6.9)
ERYTHROCYTE [DISTWIDTH] IN BLOOD BY AUTOMATED COUNT: 51.1 FL (ref 35.9–50)
GLOBULIN SER CALC-MCNC: 2.1 G/DL (ref 1.9–3.5)
GLUCOSE SERPL-MCNC: 99 MG/DL (ref 65–99)
HCG SERPL QL: NEGATIVE
HCT VFR BLD AUTO: 32.2 % (ref 37–47)
HGB BLD-MCNC: 9.4 G/DL (ref 12–16)
HYPOCHROMIA BLD QL SMEAR: ABNORMAL
IMM GRANULOCYTES # BLD AUTO: 0.01 K/UL (ref 0–0.11)
IMM GRANULOCYTES NFR BLD AUTO: 0.2 % (ref 0–0.9)
LYMPHOCYTES # BLD AUTO: 2.4 K/UL (ref 1–4.8)
LYMPHOCYTES NFR BLD: 37.2 % (ref 22–41)
MCH RBC QN AUTO: 23.6 PG (ref 27–33)
MCHC RBC AUTO-ENTMCNC: 29.2 G/DL (ref 33.6–35)
MCV RBC AUTO: 80.7 FL (ref 81.4–97.8)
MICROCYTES BLD QL SMEAR: ABNORMAL
MONOCYTES # BLD AUTO: 0.65 K/UL (ref 0–0.85)
MONOCYTES NFR BLD AUTO: 10.1 % (ref 0–13.4)
MORPHOLOGY BLD-IMP: NORMAL
NEUTROPHILS # BLD AUTO: 3.2 K/UL (ref 2–7.15)
NEUTROPHILS NFR BLD: 49.4 % (ref 44–72)
NRBC # BLD AUTO: 0 K/UL
NRBC BLD-RTO: 0 /100 WBC
OVALOCYTES BLD QL SMEAR: NORMAL
PLATELET # BLD AUTO: 320 K/UL (ref 164–446)
PLATELET BLD QL SMEAR: NORMAL
PMV BLD AUTO: 9.3 FL (ref 9–12.9)
POIKILOCYTOSIS BLD QL SMEAR: NORMAL
POTASSIUM SERPL-SCNC: 3.4 MMOL/L (ref 3.6–5.5)
PROT SERPL-MCNC: 6.4 G/DL (ref 6–8.2)
RBC # BLD AUTO: 3.99 M/UL (ref 4.2–5.4)
RBC BLD AUTO: PRESENT
SODIUM SERPL-SCNC: 138 MMOL/L (ref 135–145)
WBC # BLD AUTO: 6.5 K/UL (ref 4.8–10.8)

## 2019-08-20 PROCEDURE — 99214 OFFICE O/P EST MOD 30 MIN: CPT | Performed by: NURSE PRACTITIONER

## 2019-08-20 PROCEDURE — 93005 ELECTROCARDIOGRAM TRACING: CPT

## 2019-08-20 PROCEDURE — 85025 COMPLETE CBC W/AUTO DIFF WBC: CPT

## 2019-08-20 PROCEDURE — 93005 ELECTROCARDIOGRAM TRACING: CPT | Performed by: EMERGENCY MEDICINE

## 2019-08-20 PROCEDURE — 80053 COMPREHEN METABOLIC PANEL: CPT

## 2019-08-20 PROCEDURE — A9270 NON-COVERED ITEM OR SERVICE: HCPCS | Performed by: EMERGENCY MEDICINE

## 2019-08-20 PROCEDURE — 99283 EMERGENCY DEPT VISIT LOW MDM: CPT

## 2019-08-20 PROCEDURE — 700102 HCHG RX REV CODE 250 W/ 637 OVERRIDE(OP): Performed by: EMERGENCY MEDICINE

## 2019-08-20 PROCEDURE — 70551 MRI BRAIN STEM W/O DYE: CPT

## 2019-08-20 PROCEDURE — 84703 CHORIONIC GONADOTROPIN ASSAY: CPT

## 2019-08-20 RX ORDER — MECLIZINE HYDROCHLORIDE 25 MG/1
25 TABLET ORAL ONCE
Status: COMPLETED | OUTPATIENT
Start: 2019-08-20 | End: 2019-08-20

## 2019-08-20 RX ADMIN — MECLIZINE HYDROCHLORIDE 25 MG: 25 TABLET ORAL at 22:40

## 2019-08-20 NOTE — TELEPHONE ENCOUNTER
Patient called stating she decided she wanted an MRI and is wondering if you can order that for her.     [phone] ok to leave detailed voicemail.

## 2019-08-20 NOTE — TELEPHONE ENCOUNTER
I returned the patient's phone call, I have discussed with the patient I am typically unable to order and/or schedule MRIs for same day as an outpatient with urgent care.  Furthermore, due to differentials, I feel that the patient requires a higher level care.  She states agreement and understanding and will go to the emergency room today for further evaluation and care.

## 2019-08-20 NOTE — PROGRESS NOTES
"Chief Complaint   Patient presents with   • Dizziness     x3days, dizziness, vomiting, worse in morning, headache, turned at night and got worse       HISTORY OF PRESENT ILLNESS: Patient is a 39 y.o. female, with a history of RA, who presents to urgent care today with complaints of dizziness.  Notes her dizziness started 2 days ago when she went to lie down for her nights rest.  At that time she felt vertigo-like sensation, described as \"the room is spinning \".  She woke yesterday morning and symptoms persisted.  Since then she continues to have the vertigo sensation, exacerbated with head movement and positional changes.  Admits to associated nausea.  She does note that prior to her onset of symptoms, 2 mornings ago, she did have a headache which she related to drinking too much alcohol the night before.  The headache improved throughout that day but then returned again yesterday, and is again returned today.  The headache is frontal, described as pressure, different than her typical headaches.  She denies any numbness, tingling, tinnitus, unilateral weakness, confusion, droop.  She denies previous history of the same.        Patient Active Problem List    Diagnosis Date Noted   • Rheumatoid arthritis (HCC) 02/14/2014   • Knee mass 02/14/2014   • Hemorrhoid 02/14/2014       Allergies:Patient has no known allergies.    Current Outpatient Medications Ordered in Epic   Medication Sig Dispense Refill   • sulfaSALAzine (AZULFIDINE) 500 MG Tab TAKE 2 TABLETS BY MOUTH TWICE A  Tab 0   • calcium carbonate (OS-MARY 500) 1250 (500 Ca) MG Tab Take 500 mg by mouth every day.     • Zinc Sulfate (ZINC 15 PO) Take  by mouth.     • Magnesium Gluconate (MAGNESIUM 27 PO) Take  by mouth.     • Cholecalciferol (VITAMIN D-3) 1000 units Cap Take  by mouth.     • ACTEMRA 162 MG/0.9ML Solution Prefilled Syringe INJECT THE CONTENTS OF 1 SYRINGE (162MG) SUBCUTANEOUSLY ONE TIME EVERY 7 DAYS AS DIRECTED. 10.8 mL 2   • Diclofenac Sodium " 1 % Gel Apply 1 Application to skin as directed 3 times a day as needed. We have a concern for GI bleeding 100 g 1   • Probiotic Product (PROBIOTIC DAILY PO) Take  by mouth.       No current Epic-ordered facility-administered medications on file.        Past Medical History:   Diagnosis Date   • Arthritis     rheumatoid arthritis   • Hemorrhoid 2014   • Knee mass 2014   • Rheumatoid arthritis(714.0) 2014       Social History     Tobacco Use   • Smoking status: Never Smoker   • Smokeless tobacco: Never Used   Substance Use Topics   • Alcohol use: Yes     Alcohol/week: 2.4 oz     Types: 3 Glasses of wine, 1 Cans of beer per week   • Drug use: No       Family Status   Relation Name Status   • Mo  Alive   • Fa  Alive   • Sis  Alive   • MAunt  Alive   • MGMo  Alive   • MGFa  Alive   • PGMo  Alive   • PGFa          navy -- plane shot down   • OTHER Pat cousin Alive     Family History   Problem Relation Age of Onset   • Heart Disease Mother    • Hyperlipidemia Mother    • Hypertension Mother    • Cancer Maternal Aunt         esophageal CA, + HPV   • Hypertension Maternal Aunt    • Heart Disease Maternal Grandmother    • Heart Attack Maternal Grandmother 77   • Clotting Disorder Maternal Grandmother         DVT   • Cancer Maternal Grandmother         HPV   • Diabetes Maternal Grandfather    • Cancer Maternal Grandfather         Leukemia   • Cancer Paternal Grandmother         breast CA   • Rheumatologic Disease Paternal Grandmother         RA, SLE   • Rheumatologic Disease Other         RA       ROS:  Review of Systems   Constitutional: Negative for fever, chills, weight loss, malaise, and fatigue.   HENT: Negative for tinnitus, ear pain, nosebleeds, congestion, sore throat and neck pain.    Eyes: Negative for vision changes.   Neuro: Positive for headache, vertigo sensation.  Negative for weakness, seizure, LOC.   Cardiovascular: Negative for chest pain, palpitations, orthopnea and leg swelling.  "  Respiratory: Negative for cough, sputum production, shortness of breath and wheezing.   Gastrointestinal: Negative for abdominal pain, nausea, vomiting or diarrhea.   Genitourinary: Negative for dysuria, urgency and frequency.  Musculoskeletal: Negative for falls, neck pain, back pain, joint pain, myalgias.   Skin: Negative for rash, diaphoresis.     Exam:  /82 (BP Location: Left arm, Patient Position: Sitting, BP Cuff Size: Adult)   Pulse 60   Temp 36.4 °C (97.5 °F) (Temporal)   Resp 16   Ht 1.651 m (5' 5\")   Wt 56.2 kg (123 lb 12.8 oz)   SpO2 100%   General: well-nourished, well-developed female in NAD  Head: normocephalic, atraumatic  Eyes: PERRLA, no conjunctival injection, acuity grossly intact, lids normal.  Ears: normal shape and symmetry, no tenderness, no discharge. External canals are without any significant edema or erythema. Tympanic membranes are without any inflammation, no effusion. Gross auditory acuity is intact.  Nose: symmetrical without tenderness, no discharge.  Mouth/Throat: reasonable hygiene, no erythema, exudates or tonsillar enlargement.  Neck: no masses, range of motion within normal limits, no tracheal deviation. No obvious thyroid enlargement.   Lymph: no cervical adenopathy. No supraclavicular adenopathy.   Neuro: alert and oriented. Cranial nerves 1-12 grossly intact. No sensory deficit.    Cardiovascular: regular rate and rhythm. No edema.  Pulmonary: no distress. Chest is symmetrical with respiration, no wheezes, crackles, or rhonchi.   Musculoskeletal: no clubbing, appropriate muscle tone, gait is stable.  Skin: warm, dry, intact, no clubbing, no cyanosis, no rashes.   Psych: appropriate mood, affect, judgement.         Assessment/Plan:  1. Vertigo             Patient is a pleasant 39-year-old female who presents the clinic today with complaints of vertigo-like sensation.  Upon examination I do not see any neurological deficits, her symptoms are reproducible with " lateral head movements.  Due to her presentation, I have discussed symptoms are very similar to BPPV.  Nevertheless, she and I are both concerned about the addition of her new abnormal headache.  Due to such, I feel the patient requires a higher level of care including closer monitoring, stat lab work and/or imaging for further evaluation as I cannot exclude further central neurological etiologies in the urgent care setting. This has been discussed with the patient and she states agreement and understanding.  The patient is stable to leave POV at this time and will go directly to ED without delay, she will not drive, I have offered the patient an ambulance ride but she is declining.         Please note that this dictation was created using voice recognition software. I have made every reasonable attempt to correct obvious errors, but I expect that there are errors of grammar and possibly content that I did not discover before finalizing the note.      CAROLEE Kay.

## 2019-08-21 VITALS
TEMPERATURE: 98.6 F | WEIGHT: 123.02 LBS | HEIGHT: 65 IN | HEART RATE: 77 BPM | OXYGEN SATURATION: 99 % | RESPIRATION RATE: 16 BRPM | SYSTOLIC BLOOD PRESSURE: 114 MMHG | BODY MASS INDEX: 20.5 KG/M2 | DIASTOLIC BLOOD PRESSURE: 66 MMHG

## 2019-08-21 LAB — EKG IMPRESSION: NORMAL

## 2019-08-21 RX ORDER — MECLIZINE HYDROCHLORIDE 25 MG/1
25 TABLET ORAL 3 TIMES DAILY PRN
Qty: 20 TAB | Refills: 0 | Status: SHIPPED | OUTPATIENT
Start: 2019-08-21

## 2019-08-21 RX ORDER — ONDANSETRON 4 MG/1
4 TABLET, FILM COATED ORAL EVERY 4 HOURS PRN
Qty: 10 TAB | Refills: 0 | Status: SHIPPED | OUTPATIENT
Start: 2019-08-21 | End: 2019-11-06

## 2019-08-21 NOTE — ED PROVIDER NOTES
ED Provider Note    Chief Complaint:   Dizziness, vertigo    HPI:  Sunshine Miller is a 39 y.o. female who presents with chief complaint of disequilibrium.  Her symptoms began 3 nights ago.  She initially had an episode of nausea coupled with a sensation of being off balance.  Symptoms initially worsened, then began to improve though never completely resolved.  This evening, she again developed symptoms of worsening balance, sensation of the room spinning, as well as associated nausea.  She did not have any episodes of vomiting, denies disequilibrium.  Initially, 3 days ago she developed a frontal headache.  Headache has since resolved.  Headache onset was initially gradual.  She denies any recent fevers, denies any pain radiating to the neck or back.  She denies any previous history of similar symptoms.  Denies associated tinnitus.    She had no chest pain, no shortness of breath, no abdominal pain.  She denies any abnormal rashes or lesions.  She does have a history of rheumatoid arthritis, otherwise no significant past medical history.    She went to urgent care earlier today, they were concerned that her symptoms may be related to central vertigo, so she was sent to the emergency department for further evaluation.    Review of Systems:  See HPI for pertinent positives and negatives. All other systems negative.    Past Medical History:   has a past medical history of Arthritis, Hemorrhoid (2/14/2014), Knee mass (2/14/2014), and Rheumatoid arthritis(714.0) (2/14/2014).    Social History:  Social History     Tobacco Use   • Smoking status: Never Smoker   • Smokeless tobacco: Never Used   Substance and Sexual Activity   • Alcohol use: Yes     Alcohol/week: 2.4 oz     Types: 3 Glasses of wine, 1 Cans of beer per week   • Drug use: No   • Sexual activity: Yes     Partners: Male     Birth control/protection: Pill       Surgical History:   has a past surgical history that includes gyn surgery (2008); gyn surgery  "(2010); other; and repeat c section (10/21/2013).    Current Medications:  Home Medications     Reviewed by Cali Moura R.N. (Registered Nurse) on 08/20/19 at 1859  Med List Status: Partial   Medication Last Dose Status   ACTEMRA 162 MG/0.9ML Solution Prefilled Syringe  Active   calcium carbonate (OS-MARY 500) 1250 (500 Ca) MG Tab  Active   Cholecalciferol (VITAMIN D-3) 1000 units Cap  Active   Diclofenac Sodium 1 % Gel  Active   Magnesium Gluconate (MAGNESIUM 27 PO)  Active   Probiotic Product (PROBIOTIC DAILY PO)  Active   sulfaSALAzine (AZULFIDINE) 500 MG Tab  Active   Zinc Sulfate (ZINC 15 PO)  Active                Allergies:  No Known Allergies    Physical Exam:  Vital Signs: /66   Pulse 77   Temp 37 °C (98.6 °F) (Temporal)   Resp 16   Ht 1.651 m (5' 5\")   Wt 55.8 kg (123 lb 0.3 oz)   SpO2 99%   BMI 20.47 kg/m²   Constitutional: Alert, no acute distress  HENT: Moist mucus membranes, normal posterior pharynx, no intraoral lesions  Eyes: Pupils equal and reactive, normal conjunctiva  Neck: Supple, normal range of motion, no stridor  Cardiovascular: Extremities are warm and well perfused, no murmur appreciated, normal cardiac auscultation  Pulmonary: No respiratory distress, normal work of breathing, no accessory muscule usage, breath sounds clear and equal bilaterally  Abdomen: Non-distended  Skin: Warm, dry, no rashes or lesions  Musculoskeletal: Normal range of motion in all extremities, no swelling or deformity noted  Neurologic: CN II-XII intact, speech normal, muscle strength 5/5 in all four extremities, normal  strength bilaterally, sensation grossly intact, normal finger-to-nose, no pronator drift  Psychiatric: Normal and appropriate mood and affect    Medical records reviewed for continuity of care.  Urgent care clinic note reviewed from earlier today.  Patient was seen with concern for dizziness.  It began 2 days prior to arrival.  This is described as a sensation of vertigo with " associated frontal headache.  She was referred to the emergency department for further evaluation.    Labs:  Labs Reviewed   CBC WITH DIFFERENTIAL - Abnormal; Notable for the following components:       Result Value    RBC 3.99 (*)     Hemoglobin 9.4 (*)     Hematocrit 32.2 (*)     MCV 80.7 (*)     MCH 23.6 (*)     MCHC 29.2 (*)     RDW 51.1 (*)     All other components within normal limits   COMP METABOLIC PANEL - Abnormal; Notable for the following components:    Potassium 3.4 (*)     All other components within normal limits   HCG QUAL SERUM   ESTIMATED GFR   PERIPHERAL SMEAR REVIEW   PLATELET ESTIMATE   MORPHOLOGY   DIFFERENTIAL COMMENT       Radiology:  MR-BRAIN-W/O    (Results Pending)        ED Medications Administered:  Medications   meclizine (ANTIVERT) tablet 25 mg (25 mg Oral Given 8/20/19 2240)       Differential diagnosis:  Benign positional vertigo, CVA, intra-cranial mass    MDM:  Patient presents with chief complaint of vertiginous symptoms and mild headache that is been intermittently worsening and improving over the past 3 days.  Headache was gradual onset, not the most severe headache she is ever had, less concerning for subarachnoid hemorrhage.  She has had no fevers, no neck or back pain, no evidence of encephalitis or meningitis.  She has no focal neurologic deficits, last normal was 3 days ago, no indication for stroke activation on arrival.    On laboratory evaluation she has a normal white blood count, again less concerning for infectious etiology.  Hemoglobin is 9.4, this is not significant we changed from her baseline.  She reports a long-standing history of anemia.  She has no electrolyte abnormalities.  hCG is negative.    She has no stroke risk factors excepting rheumatoid arthritis.  She has no history of coagulopathy or DVT.  MRI obtained, preliminary read is negative for acute process.    She was treated with meclizine in the emergency department with some improvement of symptoms.   Plan at this time is for discharge home with meclizine and Zofran, she is also provided with information on the Epley maneuver.  She is counseled to follow-up with her primary care physician within 1 to 2 days for complete recheck. Return precautions were discussed with the patient, and provided in written form with the patient's discharge instructions.     Disposition:  Discharge home in stable condition    Final Impression:  1. Vertigo    2. Dizziness      Discharge Medication List as of 8/21/2019 12:09 AM      START taking these medications    Details   meclizine (ANTIVERT) 25 MG Tab Take 1 Tab by mouth 3 times a day as needed., Disp-20 Tab, R-0, Print Rx Paper      ondansetron (ZOFRAN) 4 MG Tab tablet 4 mg, EVERY 4 HOURS PRN Starting Wed 8/21/2019, Disp-10 Tab, R-0, Oral             Electronically signed by: Erika Dillard, 8/21/2019 3:36 AM

## 2019-08-21 NOTE — ED TRIAGE NOTES
"Chief Complaint   Patient presents with   • Dizziness     since Sunday evening, persistent. worse with fast movement. intermittent headache, nausea.    • Sent from Urgent Care     r/o BPPV, work-up     Pt to triage for above. EKG completed. NAD noted. VSS. No other neuro s/sx.     Pt returned to [facility]. Educated on triage process and to inform staff of any changes.     /72   Pulse 63   Temp 37 °C (98.6 °F) (Temporal)   Resp 20   Ht 1.651 m (5' 5\")   Wt 55.8 kg (123 lb 0.3 oz)   SpO2 99%   BMI 20.47 kg/m²     "

## 2019-08-21 NOTE — DISCHARGE INSTRUCTIONS
Please follow-up with your primary care physician for complete recheck within 2 to 3 days.  Return to the emergency department if you develop any new or worsening symptoms including worsening dizziness, lightheadedness, nausea or vomiting, headaches, or any further concerns.  You may take the medications as prescribed if needed to control your symptoms.  You do not have to continue these medications if they are not helping.  If you are having no alleviation of your symptoms, return to the emergency department for recheck.

## 2019-08-22 RX ORDER — SULFASALAZINE 500 MG/1
TABLET ORAL
Qty: 360 TAB | Refills: 0 | Status: SHIPPED | OUTPATIENT
Start: 2019-08-22 | End: 2019-11-06 | Stop reason: SDUPTHER

## 2019-08-22 NOTE — TELEPHONE ENCOUNTER
Labs 8/20/19 ordered by outside physician reviewed:  CMP mildly low K, other wise normal  CBC with stable anemia at 9.4 (baseline around 10), MCV 80    SSZ refill approved, patient has follow up in november    Should have labs as ordered by me a few days prior to that appt

## 2019-11-05 ENCOUNTER — HOSPITAL ENCOUNTER (OUTPATIENT)
Dept: LAB | Facility: MEDICAL CENTER | Age: 39
End: 2019-11-05
Attending: INTERNAL MEDICINE
Payer: COMMERCIAL

## 2019-11-05 DIAGNOSIS — Z79.899 HIGH RISK MEDICATION USE: ICD-10-CM

## 2019-11-05 DIAGNOSIS — M06.9 RHEUMATOID ARTHRITIS, INVOLVING UNSPECIFIED SITE, UNSPECIFIED RHEUMATOID FACTOR PRESENCE: ICD-10-CM

## 2019-11-05 LAB
ALBUMIN SERPL BCP-MCNC: 4.2 G/DL (ref 3.2–4.9)
ALBUMIN/GLOB SERPL: 2 G/DL
ALP SERPL-CCNC: 32 U/L (ref 30–99)
ALT SERPL-CCNC: 19 U/L (ref 2–50)
ANION GAP SERPL CALC-SCNC: 8 MMOL/L (ref 0–11.9)
AST SERPL-CCNC: 24 U/L (ref 12–45)
BASOPHILS # BLD AUTO: 1.2 % (ref 0–1.8)
BASOPHILS # BLD: 0.06 K/UL (ref 0–0.12)
BILIRUB SERPL-MCNC: 0.5 MG/DL (ref 0.1–1.5)
BUN SERPL-MCNC: 17 MG/DL (ref 8–22)
CALCIUM SERPL-MCNC: 9.3 MG/DL (ref 8.5–10.5)
CHLORIDE SERPL-SCNC: 109 MMOL/L (ref 96–112)
CO2 SERPL-SCNC: 22 MMOL/L (ref 20–33)
CREAT SERPL-MCNC: 0.75 MG/DL (ref 0.5–1.4)
CRP SERPL HS-MCNC: 0.03 MG/DL (ref 0–0.75)
EOSINOPHIL # BLD AUTO: 0.08 K/UL (ref 0–0.51)
EOSINOPHIL NFR BLD: 1.6 % (ref 0–6.9)
ERYTHROCYTE [DISTWIDTH] IN BLOOD BY AUTOMATED COUNT: 48.4 FL (ref 35.9–50)
ERYTHROCYTE [SEDIMENTATION RATE] IN BLOOD BY WESTERGREN METHOD: <1 MM/HOUR (ref 0–20)
GLOBULIN SER CALC-MCNC: 2.1 G/DL (ref 1.9–3.5)
GLUCOSE SERPL-MCNC: 84 MG/DL (ref 65–99)
HCT VFR BLD AUTO: 28.1 % (ref 37–47)
HGB BLD-MCNC: 8.7 G/DL (ref 12–16)
IMM GRANULOCYTES # BLD AUTO: 0.01 K/UL (ref 0–0.11)
IMM GRANULOCYTES NFR BLD AUTO: 0.2 % (ref 0–0.9)
LYMPHOCYTES # BLD AUTO: 1.91 K/UL (ref 1–4.8)
LYMPHOCYTES NFR BLD: 38 % (ref 22–41)
MCH RBC QN AUTO: 24 PG (ref 27–33)
MCHC RBC AUTO-ENTMCNC: 31 G/DL (ref 33.6–35)
MCV RBC AUTO: 77.6 FL (ref 81.4–97.8)
MONOCYTES # BLD AUTO: 0.54 K/UL (ref 0–0.85)
MONOCYTES NFR BLD AUTO: 10.7 % (ref 0–13.4)
NEUTROPHILS # BLD AUTO: 2.43 K/UL (ref 2–7.15)
NEUTROPHILS NFR BLD: 48.3 % (ref 44–72)
NRBC # BLD AUTO: 0 K/UL
NRBC BLD-RTO: 0 /100 WBC
PLATELET # BLD AUTO: 306 K/UL (ref 164–446)
PMV BLD AUTO: 9.7 FL (ref 9–12.9)
POTASSIUM SERPL-SCNC: 4 MMOL/L (ref 3.6–5.5)
PROT SERPL-MCNC: 6.3 G/DL (ref 6–8.2)
RBC # BLD AUTO: 3.62 M/UL (ref 4.2–5.4)
SODIUM SERPL-SCNC: 139 MMOL/L (ref 135–145)
WBC # BLD AUTO: 5 K/UL (ref 4.8–10.8)

## 2019-11-05 PROCEDURE — 80053 COMPREHEN METABOLIC PANEL: CPT

## 2019-11-05 PROCEDURE — 85652 RBC SED RATE AUTOMATED: CPT

## 2019-11-05 PROCEDURE — 36415 COLL VENOUS BLD VENIPUNCTURE: CPT

## 2019-11-05 PROCEDURE — 85025 COMPLETE CBC W/AUTO DIFF WBC: CPT

## 2019-11-05 PROCEDURE — 86140 C-REACTIVE PROTEIN: CPT

## 2019-11-06 ENCOUNTER — OFFICE VISIT (OUTPATIENT)
Dept: RHEUMATOLOGY | Facility: MEDICAL CENTER | Age: 39
End: 2019-11-06
Payer: COMMERCIAL

## 2019-11-06 VITALS
TEMPERATURE: 97.6 F | WEIGHT: 125.8 LBS | SYSTOLIC BLOOD PRESSURE: 122 MMHG | HEIGHT: 65 IN | HEART RATE: 99 BPM | OXYGEN SATURATION: 99 % | BODY MASS INDEX: 20.96 KG/M2 | DIASTOLIC BLOOD PRESSURE: 60 MMHG

## 2019-11-06 DIAGNOSIS — K64.9 HEMORRHOIDS, UNSPECIFIED HEMORRHOID TYPE: ICD-10-CM

## 2019-11-06 DIAGNOSIS — Z79.899 HIGH RISK MEDICATION USE: ICD-10-CM

## 2019-11-06 DIAGNOSIS — M06.9 RHEUMATOID ARTHRITIS, INVOLVING UNSPECIFIED SITE, UNSPECIFIED RHEUMATOID FACTOR PRESENCE: Primary | ICD-10-CM

## 2019-11-06 DIAGNOSIS — D64.9 ANEMIA, UNSPECIFIED TYPE: ICD-10-CM

## 2019-11-06 PROCEDURE — 99214 OFFICE O/P EST MOD 30 MIN: CPT | Performed by: INTERNAL MEDICINE

## 2019-11-06 RX ORDER — SULFASALAZINE 500 MG/1
1000 TABLET ORAL 2 TIMES DAILY
Qty: 360 TAB | Refills: 0 | Status: SHIPPED | OUTPATIENT
Start: 2019-11-06 | End: 2020-01-10 | Stop reason: SDUPTHER

## 2019-11-06 NOTE — PROGRESS NOTES
RHEUMATOLOGY CLINIC FOLLOW UP NOTE        Chief complaint: follow up RA      HPI:  40 y/o F with RA presents today for follow up,  last seen by me 3/2019.     Remains on SSZ 1000mg BIDand Actemra 162mg weekly.  Reports she will still flare once every couple months for a few days in her wrists, fingers, ankles or toes but this is still a lot less than prior.  These are usually triggered by high stress and low sleep.  She uses voltaren gel as needed with this and if really bad will take advil but tries to avoid this. Rates overall disease at  2-3/10. No fevers or infections. Did have an episode of vertigo in august but this has resolved for the most part, still with occasional dizziness.    Still with rectal bleeding that will occur every now and again for 3-4 days.  She reports that her periods last 4 days, first day will be heavy but the others are pretty average.        Rheum Background:  Per Dr. 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  SHe is RF negative and ANTHONY/SSA/SSB negative.       She was diagnosed with RA in [place].  She presented with  In 2006 she following a miscarriage she started to feel tightness in her neck.  When she became pregnant again her symptoms resolved.  However following delivery she had recurrence of her symptoms.  She as finally diagnosed in 5/2009.  She did also have swelling in her feet, neck and thumb as well as her feet.  She also has had symptoms in her shoulders.  She denies sicca symptoms.  She has been told she has dry eyes.       When she moved to [place] she was followed by Denise Stewart then Brenna Malhotra   She has had intermittent rash since 2009 with appearance on her dorsum of the hands and feet that sometimes could turn into a fluid filled lesions and papules.  She manages this with clobetasol.      Previous Medication:  Never been on methotrexate  Tried plaquenil  which did not do well  On sulfasalazine since day 1 in 2009  Humira (didn't work and awful side effects)  remicade  Enbrel (first biologic and after pregnancy when she revisited this it did not work)  Orencia (did not work)  rituxan (did not work)  Actemra- started around 2015?         Pertinent serologies  Quantiferon negative 8/18/2016  HLA B26 negative  8/18/2016  Uric acid was 30 mg/dL  8/18/2016  SSA negative   8/18/2016\SSB negativeAAB negative 8/18/2016 lab lisa  Rheumatoid arthritis factor 13.7 (0-13.9) 8/18/2016  Cardiolipin and beta 2 glycoprotein negative     Hep B/C serologies negative 1/2014     Imaging:   Single view of both hands 8/18/2016  Severe JSN, subchondral sclerosis and hypertrophic spurring of the radial carpal, intercarpal and carpal metacarpal joints of the right wrist.     Family history: Mother and grandmother had psoriasis, father had Lyme disease, mother had heart disease and colitis and high blood pressure.     Past medical history: Rheumatoid arthritis, lupus, knee lumpectomy.     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 Authorizing Provider   sulfaSALAzine (AZULFIDINE) 500 MG Tab TAKE 2 TABLETS BY MOUTH TWICE A DAY 8/22/19   Heidi Winkler M.D.   meclizine (ANTIVERT) 25 MG Tab Take 1 Tab by mouth 3 times a day as needed. 8/21/19   Erika Dillard M.D.   ondansetron (ZOFRAN) 4 MG Tab tablet Take 1 Tab by mouth every four hours as needed for Nausea/Vomiting. 8/21/19   Erika Dillard M.D.   calcium carbonate (OS-MARY 500) 1250 (500 Ca) MG Tab Take 500 mg by mouth every day.    Physician Outpatient   Zinc Sulfate (ZINC 15 PO) Take  by mouth.     "Physician Outpatient   Magnesium Gluconate (MAGNESIUM 27 PO) Take  by mouth.    Physician Outpatient   Cholecalciferol (VITAMIN D-3) 1000 units Cap Take  by mouth.    Physician Outpatient   ACTEMRA 162 MG/0.9ML Solution Prefilled Syringe INJECT THE CONTENTS OF 1 SYRINGE (162MG) SUBCUTANEOUSLY ONE TIME EVERY 7 DAYS AS DIRECTED. 1/4/19   Heidi Winkler M.D.   Diclofenac Sodium 1 % Gel Apply 1 Application to skin as directed 3 times a day as needed. We have a concern for GI bleeding 5/1/18   Linda Schaeffer M.D.   Probiotic Product (PROBIOTIC DAILY PO) Take  by mouth.    Physician Outpatient           Past Medical History:   Diagnosis Date   • Arthritis     rheumatoid arthritis   • Hemorrhoid 2/14/2014   • Knee mass 2/14/2014   • Rheumatoid arthritis(714.0) 2/14/2014            reports that she has never smoked. She has never used smokeless tobacco. She reports current alcohol use of about 2.4 oz of alcohol per week. She reports that she does not use drugs.             Physical Exam:     /60 (BP Location: Left arm, Patient Position: Sitting, BP Cuff Size: Adult)   Pulse 99   Temp 36.4 °C (97.6 °F) (Temporal)   Ht 1.651 m (5' 5\")   Wt 57.1 kg (125 lb 12.8 oz)   SpO2 99%   BMI 20.93 kg/m²         GEN: well-appearing, NAD  Head: no focal alopecia, no hair thinning  ENT: no conjunctival icterus or injection  Pulm: normal chest expansion, speaking in full sentences  MUSCUSKELETAL:  no edema, dactylitis, entesitis     SHOULDERs: full ROM no tenderness  ELBOWS:  no tenderness no synovitis  WRISTS:   no tenderness no synovitis, right wrist with fusion with minimal flexion and extension  CMC JOINT:  R normal L normal          MCPS: Right second MCP with tenderness without synovitis  PIPS:    no tenderness no synovitis  DIPS: no tenderness no synovitis  KNEES:  no tenderness no synovitis              ANKLES:  no tenderness no synovitis           MTPS: Left second MTP with tenderness without synovitis   IP TOES: no " tenderness no synovitis  SKIN: no rashes, skin changes, nail changes, no periungual erythema.               Labs:    Results for orders placed or performed during the hospital encounter of 11/05/19   CRP QUANTITIVE (NON-CARDIAC)   Result Value Ref Range    Stat C-Reactive Protein 0.03 0.00 - 0.75 mg/dL   WESTERGREN SED RATE   Result Value Ref Range    Sed Rate Westergren <1 0 - 20 mm/hour   Comp Metabolic Panel   Result Value Ref Range    Sodium 139 135 - 145 mmol/L    Potassium 4.0 3.6 - 5.5 mmol/L    Chloride 109 96 - 112 mmol/L    Co2 22 20 - 33 mmol/L    Anion Gap 8.0 0.0 - 11.9    Glucose 84 65 - 99 mg/dL    Bun 17 8 - 22 mg/dL    Creatinine 0.75 0.50 - 1.40 mg/dL    Calcium 9.3 8.5 - 10.5 mg/dL    AST(SGOT) 24 12 - 45 U/L    ALT(SGPT) 19 2 - 50 U/L    Alkaline Phosphatase 32 30 - 99 U/L    Total Bilirubin 0.5 0.1 - 1.5 mg/dL    Albumin 4.2 3.2 - 4.9 g/dL    Total Protein 6.3 6.0 - 8.2 g/dL    Globulin 2.1 1.9 - 3.5 g/dL    A-G Ratio 2.0 g/dL   CBC WITH DIFFERENTIAL   Result Value Ref Range    WBC 5.0 4.8 - 10.8 K/uL    RBC 3.62 (L) 4.20 - 5.40 M/uL    Hemoglobin 8.7 (L) 12.0 - 16.0 g/dL    Hematocrit 28.1 (L) 37.0 - 47.0 %    MCV 77.6 (L) 81.4 - 97.8 fL    MCH 24.0 (L) 27.0 - 33.0 pg    MCHC 31.0 (L) 33.6 - 35.0 g/dL    RDW 48.4 35.9 - 50.0 fL    Platelet Count 306 164 - 446 K/uL    MPV 9.7 9.0 - 12.9 fL    Neutrophils-Polys 48.30 44.00 - 72.00 %    Lymphocytes 38.00 22.00 - 41.00 %    Monocytes 10.70 0.00 - 13.40 %    Eosinophils 1.60 0.00 - 6.90 %    Basophils 1.20 0.00 - 1.80 %    Immature Granulocytes 0.20 0.00 - 0.90 %    Nucleated RBC 0.00 /100 WBC    Neutrophils (Absolute) 2.43 2.00 - 7.15 K/uL    Lymphs (Absolute) 1.91 1.00 - 4.80 K/uL    Monos (Absolute) 0.54 0.00 - 0.85 K/uL    Eos (Absolute) 0.08 0.00 - 0.51 K/uL    Baso (Absolute) 0.06 0.00 - 0.12 K/uL    Immature Granulocytes (abs) 0.01 0.00 - 0.11 K/uL    NRBC (Absolute) 0.00 K/uL   ESTIMATED GFR   Result Value Ref Range    GFR If   American >60 >60 mL/min/1.73 m 2    GFR If Non African American >60 >60 mL/min/1.73 m 2         Impression/Plan:  1.  Rheumatoid arthritis  - CCP positive. with end stage disease in the right wrist s/p fusion    2.  High risk medication use      She has been well maintained on Actemra 162 mg weekly and sulfasalazine 1000 mg BID.  She does report flaring every couple of months that will last a few days that is mainly triggered by stress and will resolve spontaneously.  Overall she feels that her disease is well controlled and CDAI - 5 low disease activity today.  I do not feel escalation of therapy is warranted at this time we will continue to monitor for any worsening of disease.  I did discuss with the patient that both sulfasalazine and Actemra may cause anemia, however she has been on these medications for longer.  And it seems that most likely her anemia is from GI bleeding that has never been addressed.  However we will keep this in mind moving forward.     Labs 11/2019 reviewed:  CBC with anemia to 8.7 (prior 9.4 2 months ago, baseline around 10), MCV low at 77, otherwise normal  CMP normal  ESR and CRP normal     Plan:  - Continue Actemra 162 mg weekly  - Continue SSZ 1000mg BID- if continues to flare may consider increasing to 1500mg BID  - CBC, CMP, ESR and CRP Q 3 months-patient will have done in January so I am able to follow-up results  -Repeat lipid profile with next labs in annually while on Actemra  - May consider updating xrays at next visit  -Patient aware to hold Actemra for infections  -Advised annual flu shot      3.  Anemia  4.  Hemorrhoids - with bleeding  Most recent lab with hemoglobin down to 8.7, prior baseline was around 10  She does endorse it fatigue but is otherwise asymptomatic and is able to exercise on a regular basis without chest pain or shortness of breath.  This does seem more chronic in nature.    Had colonoscopy in past however would like to see another GI specialist, I have  placed a new referral however she did not see.  New referral placed again today as she does continue to have rectal bleeding and suspect she may have a fissure.  She is aware to make an appointment ASAP.    We will check iron panel with next labs, I have asked her to double her iron dose at this time  Check G6PD with next labs                           Patient will be in contact with me for any questions or concerns, will otherwise follow up as scheduled in 6 months or sooner as needed.    This note was generated using voice recognition software which has a small chance of producing errors of grammar and possibly content.  I have made every reasonable attempt to find and correct any obvious errors, but expect that some may not be found prior to finalization of this note.             Heidi Winkler MD

## 2020-01-09 ENCOUNTER — HOSPITAL ENCOUNTER (OUTPATIENT)
Dept: LAB | Facility: MEDICAL CENTER | Age: 40
End: 2020-01-09
Attending: INTERNAL MEDICINE
Payer: COMMERCIAL

## 2020-01-09 DIAGNOSIS — M06.9 RHEUMATOID ARTHRITIS, INVOLVING UNSPECIFIED SITE, UNSPECIFIED RHEUMATOID FACTOR PRESENCE: ICD-10-CM

## 2020-01-09 DIAGNOSIS — D64.9 ANEMIA, UNSPECIFIED TYPE: ICD-10-CM

## 2020-01-09 DIAGNOSIS — Z79.899 HIGH RISK MEDICATION USE: ICD-10-CM

## 2020-01-09 LAB
ALBUMIN SERPL BCP-MCNC: 4.2 G/DL (ref 3.2–4.9)
ALBUMIN/GLOB SERPL: 2.3 G/DL
ALP SERPL-CCNC: 31 U/L (ref 30–99)
ALT SERPL-CCNC: 14 U/L (ref 2–50)
ANION GAP SERPL CALC-SCNC: 7 MMOL/L (ref 0–11.9)
ANISOCYTOSIS BLD QL SMEAR: ABNORMAL
AST SERPL-CCNC: 19 U/L (ref 12–45)
BASOPHILS # BLD AUTO: 1.5 % (ref 0–1.8)
BASOPHILS # BLD: 0.06 K/UL (ref 0–0.12)
BILIRUB SERPL-MCNC: 0.3 MG/DL (ref 0.1–1.5)
BUN SERPL-MCNC: 14 MG/DL (ref 8–22)
BURR CELLS BLD QL SMEAR: NORMAL
CALCIUM SERPL-MCNC: 8.8 MG/DL (ref 8.5–10.5)
CHLORIDE SERPL-SCNC: 105 MMOL/L (ref 96–112)
CHOLEST SERPL-MCNC: 208 MG/DL (ref 100–199)
CO2 SERPL-SCNC: 26 MMOL/L (ref 20–33)
COMMENT 1642: NORMAL
CREAT SERPL-MCNC: 0.82 MG/DL (ref 0.5–1.4)
EOSINOPHIL # BLD AUTO: 0.13 K/UL (ref 0–0.51)
EOSINOPHIL NFR BLD: 3.3 % (ref 0–6.9)
ERYTHROCYTE [DISTWIDTH] IN BLOOD BY AUTOMATED COUNT: 69.1 FL (ref 35.9–50)
FASTING STATUS PATIENT QL REPORTED: NORMAL
GLOBULIN SER CALC-MCNC: 1.8 G/DL (ref 1.9–3.5)
GLUCOSE SERPL-MCNC: 83 MG/DL (ref 65–99)
HCT VFR BLD AUTO: 36.5 % (ref 37–47)
HDLC SERPL-MCNC: 104 MG/DL
HGB BLD-MCNC: 11.1 G/DL (ref 12–16)
HYPOCHROMIA BLD QL SMEAR: ABNORMAL
IMM GRANULOCYTES # BLD AUTO: 0.01 K/UL (ref 0–0.11)
IMM GRANULOCYTES NFR BLD AUTO: 0.3 % (ref 0–0.9)
IRON SATN MFR SERPL: 11 % (ref 15–55)
IRON SERPL-MCNC: 47 UG/DL (ref 40–170)
LDLC SERPL CALC-MCNC: 94 MG/DL
LYMPHOCYTES # BLD AUTO: 1.56 K/UL (ref 1–4.8)
LYMPHOCYTES NFR BLD: 39.2 % (ref 22–41)
MCH RBC QN AUTO: 26.2 PG (ref 27–33)
MCHC RBC AUTO-ENTMCNC: 30.4 G/DL (ref 33.6–35)
MCV RBC AUTO: 86.3 FL (ref 81.4–97.8)
MICROCYTES BLD QL SMEAR: ABNORMAL
MONOCYTES # BLD AUTO: 0.4 K/UL (ref 0–0.85)
MONOCYTES NFR BLD AUTO: 10.1 % (ref 0–13.4)
MORPHOLOGY BLD-IMP: NORMAL
NEUTROPHILS # BLD AUTO: 1.82 K/UL (ref 2–7.15)
NEUTROPHILS NFR BLD: 45.6 % (ref 44–72)
NRBC # BLD AUTO: 0 K/UL
NRBC BLD-RTO: 0 /100 WBC
OVALOCYTES BLD QL SMEAR: NORMAL
PLATELET # BLD AUTO: 276 K/UL (ref 164–446)
PLATELET BLD QL SMEAR: NORMAL
PMV BLD AUTO: 9.9 FL (ref 9–12.9)
POIKILOCYTOSIS BLD QL SMEAR: NORMAL
POTASSIUM SERPL-SCNC: 4 MMOL/L (ref 3.6–5.5)
PROT SERPL-MCNC: 6 G/DL (ref 6–8.2)
RBC # BLD AUTO: 4.23 M/UL (ref 4.2–5.4)
RBC BLD AUTO: PRESENT
SCHISTOCYTES BLD QL SMEAR: NORMAL
SODIUM SERPL-SCNC: 138 MMOL/L (ref 135–145)
TIBC SERPL-MCNC: 423 UG/DL (ref 250–450)
TRIGL SERPL-MCNC: 48 MG/DL (ref 0–149)
WBC # BLD AUTO: 4 K/UL (ref 4.8–10.8)

## 2020-01-09 PROCEDURE — 85025 COMPLETE CBC W/AUTO DIFF WBC: CPT

## 2020-01-09 PROCEDURE — 83540 ASSAY OF IRON: CPT

## 2020-01-09 PROCEDURE — 80053 COMPREHEN METABOLIC PANEL: CPT

## 2020-01-09 PROCEDURE — 82955 ASSAY OF G6PD ENZYME: CPT

## 2020-01-09 PROCEDURE — 80061 LIPID PANEL: CPT

## 2020-01-09 PROCEDURE — 83550 IRON BINDING TEST: CPT

## 2020-01-09 PROCEDURE — 36415 COLL VENOUS BLD VENIPUNCTURE: CPT

## 2020-01-09 PROCEDURE — 82728 ASSAY OF FERRITIN: CPT

## 2020-01-10 DIAGNOSIS — M06.9 RHEUMATOID ARTHRITIS, INVOLVING UNSPECIFIED SITE, UNSPECIFIED RHEUMATOID FACTOR PRESENCE: ICD-10-CM

## 2020-01-10 LAB — FERRITIN SERPL-MCNC: 8.9 NG/ML (ref 10–291)

## 2020-01-10 RX ORDER — SULFASALAZINE 500 MG/1
1000 TABLET ORAL 2 TIMES DAILY
Qty: 360 TAB | Refills: 0 | Status: SHIPPED | OUTPATIENT
Start: 2020-01-10 | End: 2020-05-13 | Stop reason: SDUPTHER

## 2020-01-11 LAB — G6PD RBC-CCNC: 12.4 U/G HB (ref 9.9–16.6)

## 2020-03-13 ENCOUNTER — HOSPITAL ENCOUNTER (OUTPATIENT)
Facility: MEDICAL CENTER | Age: 40
End: 2020-03-13
Attending: PHYSICIAN ASSISTANT
Payer: COMMERCIAL

## 2020-03-13 PROCEDURE — 83993 ASSAY FOR CALPROTECTIN FECAL: CPT

## 2020-03-17 LAB — CALPROTECTIN STL-MCNT: <16 UG/G

## 2020-05-08 ENCOUNTER — PATIENT MESSAGE (OUTPATIENT)
Dept: MEDICAL GROUP | Facility: MEDICAL CENTER | Age: 40
End: 2020-05-08

## 2020-05-08 DIAGNOSIS — M06.9 RHEUMATOID ARTHRITIS, INVOLVING UNSPECIFIED SITE, UNSPECIFIED RHEUMATOID FACTOR PRESENCE: ICD-10-CM

## 2020-05-08 RX ORDER — SULFASALAZINE 500 MG/1
1000 TABLET ORAL 2 TIMES DAILY
Qty: 360 TAB | Refills: 1 | Status: CANCELLED | OUTPATIENT
Start: 2020-05-08

## 2020-05-08 NOTE — TELEPHONE ENCOUNTER
From: Sunshine Miller  To: Fe Ortiz P.A.-C.  Sent: 5/8/2020 12:35 PM PDT  Subject: Non-Urgent Medical Question    Hello Dr. Ortiz,  I am in need of a refill of my RA medication Sulfasalazine. My previous Rheumatologist left [facility] in January and has not been replaced yet. I am waiting for [facility] to call me to set up an appointment with a new rheumatologist. Can you please refill my prescriptions while I am waiting to see a new rheumatologist for my rheumatoid arthritis?   Thank you for your time!  Best,   Sunshine Miller

## 2020-05-13 ENCOUNTER — OFFICE VISIT (OUTPATIENT)
Dept: MEDICAL GROUP | Facility: MEDICAL CENTER | Age: 40
End: 2020-05-13
Payer: COMMERCIAL

## 2020-05-13 VITALS
TEMPERATURE: 98.3 F | BODY MASS INDEX: 21.71 KG/M2 | OXYGEN SATURATION: 96 % | DIASTOLIC BLOOD PRESSURE: 70 MMHG | SYSTOLIC BLOOD PRESSURE: 110 MMHG | HEART RATE: 64 BPM | WEIGHT: 130.29 LBS | HEIGHT: 65 IN

## 2020-05-13 DIAGNOSIS — M06.09 RHEUMATOID ARTHRITIS OF MULTIPLE SITES WITH NEGATIVE RHEUMATOID FACTOR (HCC): ICD-10-CM

## 2020-05-13 DIAGNOSIS — D50.8 OTHER IRON DEFICIENCY ANEMIA: ICD-10-CM

## 2020-05-13 DIAGNOSIS — M06.9 RHEUMATOID ARTHRITIS, INVOLVING UNSPECIFIED SITE, UNSPECIFIED RHEUMATOID FACTOR PRESENCE: ICD-10-CM

## 2020-05-13 PROBLEM — D50.9 IRON DEFICIENCY ANEMIA: Status: ACTIVE | Noted: 2020-05-13

## 2020-05-13 PROCEDURE — 99213 OFFICE O/P EST LOW 20 MIN: CPT | Performed by: PHYSICIAN ASSISTANT

## 2020-05-13 RX ORDER — SULFASALAZINE 500 MG/1
1000 TABLET ORAL 2 TIMES DAILY
Qty: 120 TAB | Refills: 2 | Status: SHIPPED | OUTPATIENT
Start: 2020-05-13 | End: 2020-08-07

## 2020-05-13 ASSESSMENT — FIBROSIS 4 INDEX: FIB4 SCORE: 0.7

## 2020-05-13 ASSESSMENT — PATIENT HEALTH QUESTIONNAIRE - PHQ9: CLINICAL INTERPRETATION OF PHQ2 SCORE: 0

## 2020-05-14 NOTE — PROGRESS NOTES
"Chief Complaint   Patient presents with   • Medication Refill     sulfaSALAzine       HPI  Sunshine Miller is a 39 y.o. female here today for RA. Pt was under care of rheumatology, but he has retired and she need a new one.  Currently takes sulfaSalazine and ACTEMRA injectable.   Is going to establish w new rheum office next month.     Pt has had iron deficiency anemia since she was a child, worse in adulthood, no blood in stool but has hemorrhoid w occasional bleeding. Has been taking iron supplements daily and hb, hct and iron levels have improved         Past medical, surgical, family, and social history is reviewed in Epic chart by me today.   Medications and allergies reviewed and updated in Epic chart by me today.     ROS:   As documented in history of present illness above    Exam:  /70 (BP Location: Right arm, Patient Position: Sitting, BP Cuff Size: Adult)   Pulse 64   Temp 36.8 °C (98.3 °F) (Temporal)   Ht 1.651 m (5' 5\")   Wt 59.1 kg (130 lb 4.7 oz)   SpO2 96%   Constitutional: Alert, no distress, plus 3 vital signs  Skin:  Warm, dry, no rashes invisible areas  Eye: Equal, round and reactive, conjunctiva clear  Psych: Alert, pleasant, well-groomed, normal affect    Last lab results were reviewed by me today and discussed w patient.    A/P:  1. Rheumatoid arthritis of multiple sites with negative rheumatoid factor (HCC)    - REFERRAL TO RHEUMATOLOGY    2. Other iron deficiency anemia      3. Rheumatoid arthritis, involving unspecified site, unspecified rheumatoid factor presence (HCC)    - sulfaSALAzine (AZULFIDINE) 500 MG Tab; Take 2 Tabs by mouth 2 times a day.  Dispense: 120 Tab; Refill: 2    F/u prn     "

## 2020-07-31 ENCOUNTER — HOSPITAL ENCOUNTER (OUTPATIENT)
Dept: LAB | Facility: MEDICAL CENTER | Age: 40
End: 2020-07-31
Attending: INTERNAL MEDICINE
Payer: COMMERCIAL

## 2020-07-31 ENCOUNTER — HOSPITAL ENCOUNTER (OUTPATIENT)
Dept: LAB | Facility: MEDICAL CENTER | Age: 40
End: 2020-07-31
Attending: PHYSICIAN ASSISTANT
Payer: COMMERCIAL

## 2020-07-31 LAB
BASOPHILS # BLD AUTO: 0.7 % (ref 0–1.8)
BASOPHILS # BLD AUTO: 1.3 % (ref 0–1.8)
BASOPHILS # BLD: 0.04 K/UL (ref 0–0.12)
BASOPHILS # BLD: 0.07 K/UL (ref 0–0.12)
EOSINOPHIL # BLD AUTO: 0.15 K/UL (ref 0–0.51)
EOSINOPHIL # BLD AUTO: 0.15 K/UL (ref 0–0.51)
EOSINOPHIL NFR BLD: 2.7 % (ref 0–6.9)
EOSINOPHIL NFR BLD: 2.8 % (ref 0–6.9)
ERYTHROCYTE [DISTWIDTH] IN BLOOD BY AUTOMATED COUNT: 44.8 FL (ref 35.9–50)
ERYTHROCYTE [DISTWIDTH] IN BLOOD BY AUTOMATED COUNT: 45.3 FL (ref 35.9–50)
HCT VFR BLD AUTO: 38.7 % (ref 37–47)
HCT VFR BLD AUTO: 38.7 % (ref 37–47)
HGB BLD-MCNC: 12.5 G/DL (ref 12–16)
HGB BLD-MCNC: 12.6 G/DL (ref 12–16)
IMM GRANULOCYTES # BLD AUTO: 0.01 K/UL (ref 0–0.11)
IMM GRANULOCYTES # BLD AUTO: 0.01 K/UL (ref 0–0.11)
IMM GRANULOCYTES NFR BLD AUTO: 0.2 % (ref 0–0.9)
IMM GRANULOCYTES NFR BLD AUTO: 0.2 % (ref 0–0.9)
IRON SATN MFR SERPL: 49 % (ref 15–55)
IRON SERPL-MCNC: 134 UG/DL (ref 40–170)
LYMPHOCYTES # BLD AUTO: 1.78 K/UL (ref 1–4.8)
LYMPHOCYTES # BLD AUTO: 1.8 K/UL (ref 1–4.8)
LYMPHOCYTES NFR BLD: 31.8 % (ref 22–41)
LYMPHOCYTES NFR BLD: 33.1 % (ref 22–41)
MCH RBC QN AUTO: 32.8 PG (ref 27–33)
MCH RBC QN AUTO: 33.2 PG (ref 27–33)
MCHC RBC AUTO-ENTMCNC: 32.3 G/DL (ref 33.6–35)
MCHC RBC AUTO-ENTMCNC: 32.6 G/DL (ref 33.6–35)
MCV RBC AUTO: 101.6 FL (ref 81.4–97.8)
MCV RBC AUTO: 101.8 FL (ref 81.4–97.8)
MONOCYTES # BLD AUTO: 0.44 K/UL (ref 0–0.85)
MONOCYTES # BLD AUTO: 0.46 K/UL (ref 0–0.85)
MONOCYTES NFR BLD AUTO: 8.1 % (ref 0–13.4)
MONOCYTES NFR BLD AUTO: 8.2 % (ref 0–13.4)
NEUTROPHILS # BLD AUTO: 2.96 K/UL (ref 2–7.15)
NEUTROPHILS # BLD AUTO: 3.15 K/UL (ref 2–7.15)
NEUTROPHILS NFR BLD: 54.5 % (ref 44–72)
NEUTROPHILS NFR BLD: 56.4 % (ref 44–72)
NRBC # BLD AUTO: 0 K/UL
NRBC # BLD AUTO: 0 K/UL
NRBC BLD-RTO: 0 /100 WBC
NRBC BLD-RTO: 0 /100 WBC
PLATELET # BLD AUTO: 239 K/UL (ref 164–446)
PLATELET # BLD AUTO: 239 K/UL (ref 164–446)
PMV BLD AUTO: 10.1 FL (ref 9–12.9)
PMV BLD AUTO: 10.2 FL (ref 9–12.9)
RBC # BLD AUTO: 3.8 M/UL (ref 4.2–5.4)
RBC # BLD AUTO: 3.81 M/UL (ref 4.2–5.4)
TIBC SERPL-MCNC: 273 UG/DL (ref 250–450)
UIBC SERPL-MCNC: 139 UG/DL (ref 110–370)
WBC # BLD AUTO: 5.4 K/UL (ref 4.8–10.8)
WBC # BLD AUTO: 5.6 K/UL (ref 4.8–10.8)

## 2020-07-31 PROCEDURE — 82728 ASSAY OF FERRITIN: CPT

## 2020-07-31 PROCEDURE — 82565 ASSAY OF CREATININE: CPT

## 2020-07-31 PROCEDURE — 84466 ASSAY OF TRANSFERRIN: CPT

## 2020-07-31 PROCEDURE — 83550 IRON BINDING TEST: CPT

## 2020-07-31 PROCEDURE — 85025 COMPLETE CBC W/AUTO DIFF WBC: CPT | Mod: 91

## 2020-07-31 PROCEDURE — 85025 COMPLETE CBC W/AUTO DIFF WBC: CPT

## 2020-07-31 PROCEDURE — 85652 RBC SED RATE AUTOMATED: CPT

## 2020-07-31 PROCEDURE — 80076 HEPATIC FUNCTION PANEL: CPT

## 2020-07-31 PROCEDURE — 83540 ASSAY OF IRON: CPT

## 2020-07-31 PROCEDURE — 36415 COLL VENOUS BLD VENIPUNCTURE: CPT

## 2020-07-31 PROCEDURE — 86480 TB TEST CELL IMMUN MEASURE: CPT

## 2020-08-01 LAB
ALBUMIN SERPL BCP-MCNC: 4.4 G/DL (ref 3.2–4.9)
ALP SERPL-CCNC: 36 U/L (ref 30–99)
ALT SERPL-CCNC: 21 U/L (ref 2–50)
AST SERPL-CCNC: 26 U/L (ref 12–45)
BILIRUB CONJ SERPL-MCNC: <0.2 MG/DL (ref 0.1–0.5)
BILIRUB INDIRECT SERPL-MCNC: NORMAL MG/DL (ref 0–1)
BILIRUB SERPL-MCNC: 0.5 MG/DL (ref 0.1–1.5)
CREAT SERPL-MCNC: 0.85 MG/DL (ref 0.5–1.4)
ERYTHROCYTE [SEDIMENTATION RATE] IN BLOOD BY WESTERGREN METHOD: 0 MM/HOUR (ref 0–20)
FERRITIN SERPL-MCNC: 49.3 NG/ML (ref 10–291)
PROT SERPL-MCNC: 6.2 G/DL (ref 6–8.2)
TRANSFERRIN SERPL-MCNC: 219 MG/DL (ref 200–370)

## 2020-08-03 LAB
GAMMA INTERFERON BACKGROUND BLD IA-ACNC: 0.03 IU/ML
M TB IFN-G BLD-IMP: NEGATIVE
M TB IFN-G CD4+ BCKGRND COR BLD-ACNC: 0.03 IU/ML
MITOGEN IGNF BCKGRD COR BLD-ACNC: >10 IU/ML
QFT TB2 - NIL TBQ2: 0 IU/ML

## 2020-08-07 DIAGNOSIS — M06.9 RHEUMATOID ARTHRITIS, INVOLVING UNSPECIFIED SITE, UNSPECIFIED RHEUMATOID FACTOR PRESENCE: ICD-10-CM

## 2020-08-07 RX ORDER — SULFASALAZINE 500 MG/1
TABLET ORAL
Qty: 360 TAB | Refills: 0 | Status: SHIPPED | OUTPATIENT
Start: 2020-08-07 | End: 2020-11-10

## 2020-11-09 DIAGNOSIS — M06.9 RHEUMATOID ARTHRITIS (HCC): ICD-10-CM

## 2020-11-10 RX ORDER — SULFASALAZINE 500 MG/1
TABLET ORAL
Qty: 360 TAB | Refills: 0 | Status: SHIPPED | OUTPATIENT
Start: 2020-11-10

## 2021-01-20 ENCOUNTER — HOSPITAL ENCOUNTER (OUTPATIENT)
Dept: LAB | Facility: MEDICAL CENTER | Age: 41
End: 2021-01-20
Attending: INTERNAL MEDICINE
Payer: COMMERCIAL

## 2021-01-20 LAB
ALT SERPL-CCNC: 27 U/L (ref 2–50)
AST SERPL-CCNC: 23 U/L (ref 12–45)
BASOPHILS # BLD AUTO: 1.3 % (ref 0–1.8)
BASOPHILS # BLD: 0.07 K/UL (ref 0–0.12)
CREAT SERPL-MCNC: 0.57 MG/DL (ref 0.5–1.4)
EOSINOPHIL # BLD AUTO: 0.29 K/UL (ref 0–0.51)
EOSINOPHIL NFR BLD: 5.5 % (ref 0–6.9)
ERYTHROCYTE [DISTWIDTH] IN BLOOD BY AUTOMATED COUNT: 45.2 FL (ref 35.9–50)
ERYTHROCYTE [SEDIMENTATION RATE] IN BLOOD BY WESTERGREN METHOD: <1 MM/HOUR (ref 0–20)
HCT VFR BLD AUTO: 38.7 % (ref 37–47)
HGB BLD-MCNC: 12.8 G/DL (ref 12–16)
IMM GRANULOCYTES # BLD AUTO: 0.02 K/UL (ref 0–0.11)
IMM GRANULOCYTES NFR BLD AUTO: 0.4 % (ref 0–0.9)
LYMPHOCYTES # BLD AUTO: 1.75 K/UL (ref 1–4.8)
LYMPHOCYTES NFR BLD: 33 % (ref 22–41)
MCH RBC QN AUTO: 33.8 PG (ref 27–33)
MCHC RBC AUTO-ENTMCNC: 33.1 G/DL (ref 33.6–35)
MCV RBC AUTO: 102.1 FL (ref 81.4–97.8)
MONOCYTES # BLD AUTO: 0.48 K/UL (ref 0–0.85)
MONOCYTES NFR BLD AUTO: 9 % (ref 0–13.4)
NEUTROPHILS # BLD AUTO: 2.7 K/UL (ref 2–7.15)
NEUTROPHILS NFR BLD: 50.8 % (ref 44–72)
NRBC # BLD AUTO: 0 K/UL
NRBC BLD-RTO: 0 /100 WBC
PLATELET # BLD AUTO: 292 K/UL (ref 164–446)
PMV BLD AUTO: 9.6 FL (ref 9–12.9)
RBC # BLD AUTO: 3.79 M/UL (ref 4.2–5.4)
WBC # BLD AUTO: 5.3 K/UL (ref 4.8–10.8)

## 2021-01-20 PROCEDURE — 84450 TRANSFERASE (AST) (SGOT): CPT

## 2021-01-20 PROCEDURE — 85025 COMPLETE CBC W/AUTO DIFF WBC: CPT

## 2021-01-20 PROCEDURE — 84460 ALANINE AMINO (ALT) (SGPT): CPT

## 2021-01-20 PROCEDURE — 85652 RBC SED RATE AUTOMATED: CPT

## 2021-01-20 PROCEDURE — 36415 COLL VENOUS BLD VENIPUNCTURE: CPT

## 2021-01-20 PROCEDURE — 82565 ASSAY OF CREATININE: CPT

## 2021-06-07 ENCOUNTER — HOSPITAL ENCOUNTER (OUTPATIENT)
Dept: LAB | Facility: MEDICAL CENTER | Age: 41
End: 2021-06-07
Attending: INTERNAL MEDICINE
Payer: COMMERCIAL

## 2021-06-07 LAB
ALT SERPL-CCNC: 18 U/L (ref 2–50)
AST SERPL-CCNC: 21 U/L (ref 12–45)
BASOPHILS # BLD AUTO: 1.7 % (ref 0–1.8)
BASOPHILS # BLD: 0.08 K/UL (ref 0–0.12)
CREAT SERPL-MCNC: 0.82 MG/DL (ref 0.5–1.4)
EOSINOPHIL # BLD AUTO: 0.23 K/UL (ref 0–0.51)
EOSINOPHIL NFR BLD: 5 % (ref 0–6.9)
ERYTHROCYTE [DISTWIDTH] IN BLOOD BY AUTOMATED COUNT: 47.1 FL (ref 35.9–50)
ERYTHROCYTE [SEDIMENTATION RATE] IN BLOOD BY WESTERGREN METHOD: 3 MM/HOUR (ref 0–25)
HCT VFR BLD AUTO: 37.3 % (ref 37–47)
HGB BLD-MCNC: 12.3 G/DL (ref 12–16)
IMM GRANULOCYTES # BLD AUTO: 0.01 K/UL (ref 0–0.11)
IMM GRANULOCYTES NFR BLD AUTO: 0.2 % (ref 0–0.9)
LYMPHOCYTES # BLD AUTO: 1.89 K/UL (ref 1–4.8)
LYMPHOCYTES NFR BLD: 41.1 % (ref 22–41)
MCH RBC QN AUTO: 33.6 PG (ref 27–33)
MCHC RBC AUTO-ENTMCNC: 33 G/DL (ref 33.6–35)
MCV RBC AUTO: 101.9 FL (ref 81.4–97.8)
MONOCYTES # BLD AUTO: 0.42 K/UL (ref 0–0.85)
MONOCYTES NFR BLD AUTO: 9.1 % (ref 0–13.4)
NEUTROPHILS # BLD AUTO: 1.97 K/UL (ref 2–7.15)
NEUTROPHILS NFR BLD: 42.9 % (ref 44–72)
NRBC # BLD AUTO: 0 K/UL
NRBC BLD-RTO: 0 /100 WBC
PLATELET # BLD AUTO: 249 K/UL (ref 164–446)
PMV BLD AUTO: 10.1 FL (ref 9–12.9)
RBC # BLD AUTO: 3.66 M/UL (ref 4.2–5.4)
WBC # BLD AUTO: 4.6 K/UL (ref 4.8–10.8)

## 2021-06-07 PROCEDURE — 85025 COMPLETE CBC W/AUTO DIFF WBC: CPT

## 2021-06-07 PROCEDURE — 84450 TRANSFERASE (AST) (SGOT): CPT

## 2021-06-07 PROCEDURE — 36415 COLL VENOUS BLD VENIPUNCTURE: CPT

## 2021-06-07 PROCEDURE — 85652 RBC SED RATE AUTOMATED: CPT

## 2021-06-07 PROCEDURE — 84460 ALANINE AMINO (ALT) (SGPT): CPT

## 2021-06-07 PROCEDURE — 82565 ASSAY OF CREATININE: CPT

## 2021-09-27 ENCOUNTER — HOSPITAL ENCOUNTER (OUTPATIENT)
Dept: LAB | Facility: MEDICAL CENTER | Age: 41
End: 2021-09-27
Attending: INTERNAL MEDICINE
Payer: COMMERCIAL

## 2021-09-27 LAB
ALT SERPL-CCNC: 40 U/L (ref 2–50)
AST SERPL-CCNC: 34 U/L (ref 12–45)
BASOPHILS # BLD AUTO: 1.2 % (ref 0–1.8)
BASOPHILS # BLD: 0.07 K/UL (ref 0–0.12)
CREAT SERPL-MCNC: 0.78 MG/DL (ref 0.5–1.4)
EOSINOPHIL # BLD AUTO: 0.15 K/UL (ref 0–0.51)
EOSINOPHIL NFR BLD: 2.6 % (ref 0–6.9)
ERYTHROCYTE [DISTWIDTH] IN BLOOD BY AUTOMATED COUNT: 46.4 FL (ref 35.9–50)
ERYTHROCYTE [SEDIMENTATION RATE] IN BLOOD BY WESTERGREN METHOD: 5 MM/HOUR (ref 0–25)
HCT VFR BLD AUTO: 36.5 % (ref 37–47)
HGB BLD-MCNC: 11.8 G/DL (ref 12–16)
IMM GRANULOCYTES # BLD AUTO: 0.02 K/UL (ref 0–0.11)
IMM GRANULOCYTES NFR BLD AUTO: 0.4 % (ref 0–0.9)
LYMPHOCYTES # BLD AUTO: 1.53 K/UL (ref 1–4.8)
LYMPHOCYTES NFR BLD: 26.9 % (ref 22–41)
MCH RBC QN AUTO: 33.2 PG (ref 27–33)
MCHC RBC AUTO-ENTMCNC: 32.3 G/DL (ref 33.6–35)
MCV RBC AUTO: 102.8 FL (ref 81.4–97.8)
MONOCYTES # BLD AUTO: 0.46 K/UL (ref 0–0.85)
MONOCYTES NFR BLD AUTO: 8.1 % (ref 0–13.4)
NEUTROPHILS # BLD AUTO: 3.45 K/UL (ref 2–7.15)
NEUTROPHILS NFR BLD: 60.8 % (ref 44–72)
NRBC # BLD AUTO: 0 K/UL
NRBC BLD-RTO: 0 /100 WBC
PLATELET # BLD AUTO: 249 K/UL (ref 164–446)
PMV BLD AUTO: 9.9 FL (ref 9–12.9)
RBC # BLD AUTO: 3.55 M/UL (ref 4.2–5.4)
WBC # BLD AUTO: 5.7 K/UL (ref 4.8–10.8)

## 2021-09-27 PROCEDURE — 85652 RBC SED RATE AUTOMATED: CPT

## 2021-09-27 PROCEDURE — 84460 ALANINE AMINO (ALT) (SGPT): CPT

## 2021-09-27 PROCEDURE — 82565 ASSAY OF CREATININE: CPT

## 2021-09-27 PROCEDURE — 36415 COLL VENOUS BLD VENIPUNCTURE: CPT

## 2021-09-27 PROCEDURE — 84450 TRANSFERASE (AST) (SGOT): CPT

## 2021-09-27 PROCEDURE — 85025 COMPLETE CBC W/AUTO DIFF WBC: CPT

## 2021-09-29 ENCOUNTER — OFFICE VISIT (OUTPATIENT)
Dept: MEDICAL GROUP | Facility: MEDICAL CENTER | Age: 41
End: 2021-09-29
Payer: COMMERCIAL

## 2021-09-29 VITALS
DIASTOLIC BLOOD PRESSURE: 62 MMHG | BODY MASS INDEX: 20.83 KG/M2 | RESPIRATION RATE: 16 BRPM | HEIGHT: 65 IN | SYSTOLIC BLOOD PRESSURE: 90 MMHG | HEART RATE: 76 BPM | TEMPERATURE: 97.9 F | OXYGEN SATURATION: 97 % | WEIGHT: 125 LBS

## 2021-09-29 DIAGNOSIS — Z00.00 PREVENTATIVE HEALTH CARE: ICD-10-CM

## 2021-09-29 DIAGNOSIS — Z23 NEED FOR VACCINATION: ICD-10-CM

## 2021-09-29 DIAGNOSIS — D64.9 ANEMIA, UNSPECIFIED TYPE: ICD-10-CM

## 2021-09-29 DIAGNOSIS — Z00.00 ANNUAL PHYSICAL EXAM: ICD-10-CM

## 2021-09-29 DIAGNOSIS — M06.09 RHEUMATOID ARTHRITIS OF MULTIPLE SITES WITH NEGATIVE RHEUMATOID FACTOR (HCC): ICD-10-CM

## 2021-09-29 PROCEDURE — 99396 PREV VISIT EST AGE 40-64: CPT | Mod: 25 | Performed by: PHYSICIAN ASSISTANT

## 2021-09-29 PROCEDURE — 90686 IIV4 VACC NO PRSV 0.5 ML IM: CPT | Performed by: PHYSICIAN ASSISTANT

## 2021-09-29 PROCEDURE — 90471 IMMUNIZATION ADMIN: CPT | Performed by: PHYSICIAN ASSISTANT

## 2021-09-29 RX ORDER — TIZANIDINE 4 MG/1
TABLET ORAL
COMMUNITY
Start: 2021-09-28

## 2021-09-29 ASSESSMENT — PATIENT HEALTH QUESTIONNAIRE - PHQ9: CLINICAL INTERPRETATION OF PHQ2 SCORE: 0

## 2021-09-29 ASSESSMENT — FIBROSIS 4 INDEX: FIB4 SCORE: 0.86

## 2021-09-29 NOTE — LETTER
[facility]  Fe Ortiz P.A.-C.  [address]  [place] [address]  Fax: [phone]   Authorization for Release/Disclosure of   Protected Health Information   Name: SUNSHINE PEERZ : 1980 SSN: [id number]   Address: [address] [place] [address]  [place] [address] Phone:    [phone] (home) [phone] (work)   I authorize the entity listed below to release/disclose the PHI below to:   [facility]/Fe Ortiz P.A.-C. and Fe Ortiz P.A.-C.   Provider or Entity Name: [facility]     Address   City, State, [facility]   Phone:      Fax:     Reason for request: continuity of care   Information to be released:    [  ] LAST COLONOSCOPY,  including any PATH REPORT and follow-up  [  ] LAST FIT/COLOGUARD RESULT [  ] LAST DEXA  [  ] LAST MAMMOGRAM  [xxx] LAST PAP  [  ] LAST LABS [  ] RETINA EXAM REPORT  [  ] IMMUNIZATION RECORDS  [  ] Release all info      [  ] Check here and initial the line next to each item to release ALL health information INCLUDING  _____ Care and treatment for drug and / or alcohol abuse  _____ HIV testing, infection status, or AIDS  _____ Genetic Testing    DATES OF SERVICE OR TIME PERIOD TO BE DISCLOSED: _____________  I understand and acknowledge that:  * This Authorization may be revoked at any time by you in writing, except if your health information has already been used or disclosed.  * Your health information that will be used or disclosed as a result of you signing this authorization could be re-disclosed by the recipient. If this occurs, your re-disclosed health information may no longer be protected by State or Federal laws.  * You may refuse to sign this Authorization. Your refusal will not affect your ability to obtain treatment.  * This Authorization becomes effective upon signing and will  on (date) __________.      If no date is indicated, this Authorization will  one (1) year from the signature date.    Name: Sunshine Brar  Paul    Signature:   Date:     9/29/2021       PLEASE FAX REQUESTED RECORDS BACK TO: [phone]

## 2021-09-29 NOTE — PROGRESS NOTES
"Subjective:   CC:   Chief Complaint   Patient presents with   • Annual Exam       Sunshine Miller is a 41 y.o. female here today for annual exam      HPI:     Patient has rheumatoid arthritis, under care of Dr. Shaffer, continues on meds without any significant changes in RA.  States home with 3 kids and is busy all the time.  Tries to workout especially in the morning, yoga.  Had mammogram done this year ordered by OBGYN    Current medicines (including changes today)  Current Outpatient Medications   Medication Sig Dispense Refill   • sulfaSALAzine (AZULFIDINE) 500 MG Tab TAKE 2 TABLETS BY MOUTH TWICE A  Tab 0   • Ferrous Gluconate (IRON 27 PO) Take  by mouth.     • Tocilizumab (ACTEMRA) 162 MG/0.9ML Solution Prefilled Syringe Inject 162 mg as instructed every 7 days. 10.8 mL 3   • Diclofenac Sodium 1 % Gel Apply 1 Application to skin as directed 3 times a day as needed. We have a concern for GI bleeding 100 g 1   • meclizine (ANTIVERT) 25 MG Tab Take 1 Tab by mouth 3 times a day as needed. 20 Tab 0   • calcium carbonate (OS-MARY 500) 1250 (500 Ca) MG Tab Take 500 mg by mouth every day.     • Zinc Sulfate (ZINC 15 PO) Take  by mouth.     • Magnesium Gluconate (MAGNESIUM 27 PO) Take  by mouth.     • Cholecalciferol (VITAMIN D-3) 1000 units Cap Take  by mouth.     • tizanidine (ZANAFLEX) 4 MG Tab        No current facility-administered medications for this visit.         Past medical, surgical, family, and social history are reviewed in Epic chart by me today.   Medications and allergies reviewed in Epic chart by me today.         ROS   No chest pain, no shortness of breath, no abdominal pain  As documented in history of present illness above     Objective:     BP (!) 90/62 (BP Location: Left arm, Patient Position: Sitting)   Pulse 76   Temp 36.6 °C (97.9 °F) (Temporal)   Resp 16   Ht 1.651 m (5' 5\")   Wt 56.7 kg (125 lb)   SpO2 97%  Body mass index is 20.8 kg/m².   Physical Exam:  Constitutional: Alert, " oriented in no acute distress.  Psych: Eye contact is good, speech goal directed, affect calm  Eyes: Conjunctiva non-injected, sclera non-icteric.  ENMT: Ears:Pinna normal. TM pearly gray.               Lips without lesions, Clear oropharynx, mucous membranes pink and moist.  Neck: No cervical or supraclavicular lymphadenopathy,Trachea midline, no thyromegaly, no masses  Lungs: Unlabored respiratory effort, clear to auscultation bilaterally with good excursion, no wheez or rhonci  CV: regular rate and rhythm. No lower extremity edema  Abdomen: soft, nontender, No CVAT  Skin: no lesions in visible areas.  Ext: no edema, color normal, vascularity normal, temperature normal        Assessment and Plan:   The following treatment plan was discussed    1. Need for vaccination    - INFLUENZA VACCINE QUAD INJ (PF)    2. Preventative health care    - Lipid Profile; Future  - TSH WITH REFLEX TO FT4; Future  - VITAMIN D,25 HYDROXY; Future  - Basic Metabolic Panel; Future    3. Anemia, unspecified type    - VITAMIN B12; Future    4. Rheumatoid arthritis of multiple sites with negative rheumatoid factor (HCC)    5. Annual physical exam        Followup:  Prn          Please note that this dictation was created using voice recognition software. I have made every reasonable attempt to correct obvious errors, but I expect that there are errors of grammar and possibly content that I did not discover before finalizing the note.

## 2022-02-02 ENCOUNTER — HOSPITAL ENCOUNTER (OUTPATIENT)
Dept: LAB | Facility: MEDICAL CENTER | Age: 42
End: 2022-02-02
Attending: PHYSICIAN ASSISTANT
Payer: COMMERCIAL

## 2022-02-02 ENCOUNTER — HOSPITAL ENCOUNTER (OUTPATIENT)
Dept: LAB | Facility: MEDICAL CENTER | Age: 42
End: 2022-02-02
Attending: INTERNAL MEDICINE
Payer: COMMERCIAL

## 2022-02-02 DIAGNOSIS — D64.9 ANEMIA, UNSPECIFIED TYPE: ICD-10-CM

## 2022-02-02 DIAGNOSIS — Z00.00 PREVENTATIVE HEALTH CARE: ICD-10-CM

## 2022-02-02 LAB
25(OH)D3 SERPL-MCNC: 61 NG/ML (ref 30–100)
ALBUMIN SERPL BCP-MCNC: 4.5 G/DL (ref 3.2–4.9)
ALT SERPL-CCNC: 23 U/L (ref 2–50)
ANION GAP SERPL CALC-SCNC: 11 MMOL/L (ref 7–16)
AST SERPL-CCNC: 25 U/L (ref 12–45)
BASOPHILS # BLD AUTO: 1.5 % (ref 0–1.8)
BASOPHILS # BLD: 0.07 K/UL (ref 0–0.12)
BUN SERPL-MCNC: 22 MG/DL (ref 8–22)
CALCIUM SERPL-MCNC: 9.2 MG/DL (ref 8.5–10.5)
CHLORIDE SERPL-SCNC: 107 MMOL/L (ref 96–112)
CHOLEST SERPL-MCNC: 219 MG/DL (ref 100–199)
CO2 SERPL-SCNC: 21 MMOL/L (ref 20–33)
CREAT SERPL-MCNC: 0.71 MG/DL (ref 0.5–1.4)
CREAT SERPL-MCNC: 0.75 MG/DL (ref 0.5–1.4)
EOSINOPHIL # BLD AUTO: 0.13 K/UL (ref 0–0.51)
EOSINOPHIL NFR BLD: 2.7 % (ref 0–6.9)
ERYTHROCYTE [DISTWIDTH] IN BLOOD BY AUTOMATED COUNT: 46.5 FL (ref 35.9–50)
ERYTHROCYTE [SEDIMENTATION RATE] IN BLOOD BY WESTERGREN METHOD: 6 MM/HOUR (ref 0–25)
FASTING STATUS PATIENT QL REPORTED: NORMAL
GLUCOSE SERPL-MCNC: 87 MG/DL (ref 65–99)
HCT VFR BLD AUTO: 36 % (ref 37–47)
HDLC SERPL-MCNC: 102 MG/DL
HGB BLD-MCNC: 12 G/DL (ref 12–16)
IMM GRANULOCYTES # BLD AUTO: 0.01 K/UL (ref 0–0.11)
IMM GRANULOCYTES NFR BLD AUTO: 0.2 % (ref 0–0.9)
LDLC SERPL CALC-MCNC: 111 MG/DL
LYMPHOCYTES # BLD AUTO: 1.4 K/UL (ref 1–4.8)
LYMPHOCYTES NFR BLD: 29.5 % (ref 22–41)
MCH RBC QN AUTO: 33.7 PG (ref 27–33)
MCHC RBC AUTO-ENTMCNC: 33.3 G/DL (ref 33.6–35)
MCV RBC AUTO: 101.1 FL (ref 81.4–97.8)
MONOCYTES # BLD AUTO: 0.38 K/UL (ref 0–0.85)
MONOCYTES NFR BLD AUTO: 8 % (ref 0–13.4)
NEUTROPHILS # BLD AUTO: 2.76 K/UL (ref 2–7.15)
NEUTROPHILS NFR BLD: 58.1 % (ref 44–72)
NRBC # BLD AUTO: 0 K/UL
NRBC BLD-RTO: 0 /100 WBC
PLATELET # BLD AUTO: 240 K/UL (ref 164–446)
PMV BLD AUTO: 9.7 FL (ref 9–12.9)
POTASSIUM SERPL-SCNC: 4.5 MMOL/L (ref 3.6–5.5)
RBC # BLD AUTO: 3.56 M/UL (ref 4.2–5.4)
SODIUM SERPL-SCNC: 139 MMOL/L (ref 135–145)
TRIGL SERPL-MCNC: 31 MG/DL (ref 0–149)
TSH SERPL DL<=0.005 MIU/L-ACNC: 1.79 UIU/ML (ref 0.38–5.33)
VIT B12 SERPL-MCNC: 1466 PG/ML (ref 211–911)
WBC # BLD AUTO: 4.8 K/UL (ref 4.8–10.8)

## 2022-02-02 PROCEDURE — 82565 ASSAY OF CREATININE: CPT

## 2022-02-02 PROCEDURE — 82607 VITAMIN B-12: CPT

## 2022-02-02 PROCEDURE — 80048 BASIC METABOLIC PNL TOTAL CA: CPT

## 2022-02-02 PROCEDURE — 36415 COLL VENOUS BLD VENIPUNCTURE: CPT

## 2022-02-02 PROCEDURE — 84450 TRANSFERASE (AST) (SGOT): CPT

## 2022-02-02 PROCEDURE — 84443 ASSAY THYROID STIM HORMONE: CPT

## 2022-02-02 PROCEDURE — 82040 ASSAY OF SERUM ALBUMIN: CPT

## 2022-02-02 PROCEDURE — 85652 RBC SED RATE AUTOMATED: CPT

## 2022-02-02 PROCEDURE — 84460 ALANINE AMINO (ALT) (SGPT): CPT

## 2022-02-02 PROCEDURE — 85025 COMPLETE CBC W/AUTO DIFF WBC: CPT

## 2022-02-02 PROCEDURE — 82306 VITAMIN D 25 HYDROXY: CPT

## 2022-02-02 PROCEDURE — 80061 LIPID PANEL: CPT

## 2022-02-15 ENCOUNTER — HOSPITAL ENCOUNTER (OUTPATIENT)
Dept: LAB | Facility: MEDICAL CENTER | Age: 42
End: 2022-02-15
Attending: NURSE PRACTITIONER
Payer: COMMERCIAL

## 2022-02-15 LAB
T3FREE SERPL-MCNC: 2.06 PG/ML (ref 2–4.4)
T4 FREE SERPL-MCNC: 1.14 NG/DL (ref 0.93–1.7)
THYROPEROXIDASE AB SERPL-ACNC: <9 IU/ML (ref 0–9)
TSH SERPL DL<=0.005 MIU/L-ACNC: 1.64 UIU/ML (ref 0.38–5.33)

## 2022-02-15 PROCEDURE — 84481 FREE ASSAY (FT-3): CPT

## 2022-02-15 PROCEDURE — 36415 COLL VENOUS BLD VENIPUNCTURE: CPT

## 2022-02-15 PROCEDURE — 84439 ASSAY OF FREE THYROXINE: CPT

## 2022-02-15 PROCEDURE — 86376 MICROSOMAL ANTIBODY EACH: CPT

## 2022-02-15 PROCEDURE — 84443 ASSAY THYROID STIM HORMONE: CPT

## 2022-02-15 PROCEDURE — 86800 THYROGLOBULIN ANTIBODY: CPT

## 2022-02-17 DIAGNOSIS — R42 VERTIGO: ICD-10-CM

## 2022-02-18 LAB — THYROGLOB AB SERPL-ACNC: <0.9 IU/ML (ref 0–4)

## 2022-06-30 ENCOUNTER — HOSPITAL ENCOUNTER (OUTPATIENT)
Dept: LAB | Facility: MEDICAL CENTER | Age: 42
End: 2022-06-30
Attending: INTERNAL MEDICINE
Payer: COMMERCIAL

## 2022-06-30 LAB
ALT SERPL-CCNC: 14 U/L (ref 2–50)
AST SERPL-CCNC: 19 U/L (ref 12–45)
BASOPHILS # BLD AUTO: 1.4 % (ref 0–1.8)
BASOPHILS # BLD: 0.07 K/UL (ref 0–0.12)
CREAT SERPL-MCNC: 0.72 MG/DL (ref 0.5–1.4)
EOSINOPHIL # BLD AUTO: 0.19 K/UL (ref 0–0.51)
EOSINOPHIL NFR BLD: 3.8 % (ref 0–6.9)
ERYTHROCYTE [DISTWIDTH] IN BLOOD BY AUTOMATED COUNT: 47.6 FL (ref 35.9–50)
ERYTHROCYTE [SEDIMENTATION RATE] IN BLOOD BY WESTERGREN METHOD: 4 MM/HOUR (ref 0–25)
GFR SERPLBLD CREATININE-BSD FMLA CKD-EPI: 108 ML/MIN/1.73 M 2
HCT VFR BLD AUTO: 37.7 % (ref 37–47)
HGB BLD-MCNC: 12.5 G/DL (ref 12–16)
IMM GRANULOCYTES # BLD AUTO: 0.01 K/UL (ref 0–0.11)
IMM GRANULOCYTES NFR BLD AUTO: 0.2 % (ref 0–0.9)
LYMPHOCYTES # BLD AUTO: 1.74 K/UL (ref 1–4.8)
LYMPHOCYTES NFR BLD: 35.2 % (ref 22–41)
MCH RBC QN AUTO: 33.3 PG (ref 27–33)
MCHC RBC AUTO-ENTMCNC: 33.2 G/DL (ref 33.6–35)
MCV RBC AUTO: 100.5 FL (ref 81.4–97.8)
MONOCYTES # BLD AUTO: 0.47 K/UL (ref 0–0.85)
MONOCYTES NFR BLD AUTO: 9.5 % (ref 0–13.4)
NEUTROPHILS # BLD AUTO: 2.46 K/UL (ref 2–7.15)
NEUTROPHILS NFR BLD: 49.9 % (ref 44–72)
NRBC # BLD AUTO: 0 K/UL
NRBC BLD-RTO: 0 /100 WBC
PLATELET # BLD AUTO: 239 K/UL (ref 164–446)
PMV BLD AUTO: 10.7 FL (ref 9–12.9)
RBC # BLD AUTO: 3.75 M/UL (ref 4.2–5.4)
WBC # BLD AUTO: 4.9 K/UL (ref 4.8–10.8)

## 2022-06-30 PROCEDURE — 84450 TRANSFERASE (AST) (SGOT): CPT

## 2022-06-30 PROCEDURE — 82565 ASSAY OF CREATININE: CPT

## 2022-06-30 PROCEDURE — 85025 COMPLETE CBC W/AUTO DIFF WBC: CPT

## 2022-06-30 PROCEDURE — 36415 COLL VENOUS BLD VENIPUNCTURE: CPT

## 2022-06-30 PROCEDURE — 85652 RBC SED RATE AUTOMATED: CPT

## 2022-06-30 PROCEDURE — 84460 ALANINE AMINO (ALT) (SGPT): CPT

## 2023-01-19 ENCOUNTER — HOSPITAL ENCOUNTER (OUTPATIENT)
Dept: LAB | Facility: MEDICAL CENTER | Age: 43
End: 2023-01-19
Attending: INTERNAL MEDICINE
Payer: COMMERCIAL

## 2023-01-19 LAB
ALBUMIN SERPL BCP-MCNC: 4.2 G/DL (ref 3.2–4.9)
ALT SERPL-CCNC: 13 U/L (ref 2–50)
AST SERPL-CCNC: 12 U/L (ref 12–45)
BASOPHILS # BLD AUTO: 0.7 % (ref 0–1.8)
BASOPHILS # BLD: 0.05 K/UL (ref 0–0.12)
CREAT SERPL-MCNC: 0.85 MG/DL (ref 0.5–1.4)
EOSINOPHIL # BLD AUTO: 0.05 K/UL (ref 0–0.51)
EOSINOPHIL NFR BLD: 0.7 % (ref 0–6.9)
ERYTHROCYTE [DISTWIDTH] IN BLOOD BY AUTOMATED COUNT: 51.2 FL (ref 35.9–50)
ERYTHROCYTE [SEDIMENTATION RATE] IN BLOOD BY WESTERGREN METHOD: 4 MM/HOUR (ref 0–25)
GFR SERPLBLD CREATININE-BSD FMLA CKD-EPI: 88 ML/MIN/1.73 M 2
HCT VFR BLD AUTO: 37.7 % (ref 37–47)
HGB BLD-MCNC: 11.9 G/DL (ref 12–16)
IMM GRANULOCYTES # BLD AUTO: 0.02 K/UL (ref 0–0.11)
IMM GRANULOCYTES NFR BLD AUTO: 0.3 % (ref 0–0.9)
LYMPHOCYTES # BLD AUTO: 1.77 K/UL (ref 1–4.8)
LYMPHOCYTES NFR BLD: 24.1 % (ref 22–41)
MCH RBC QN AUTO: 28.8 PG (ref 27–33)
MCHC RBC AUTO-ENTMCNC: 31.6 G/DL (ref 33.6–35)
MCV RBC AUTO: 91.3 FL (ref 81.4–97.8)
MONOCYTES # BLD AUTO: 0.5 K/UL (ref 0–0.85)
MONOCYTES NFR BLD AUTO: 6.8 % (ref 0–13.4)
NEUTROPHILS # BLD AUTO: 4.94 K/UL (ref 2–7.15)
NEUTROPHILS NFR BLD: 67.4 % (ref 44–72)
NRBC # BLD AUTO: 0 K/UL
NRBC BLD-RTO: 0 /100 WBC
PLATELET # BLD AUTO: 359 K/UL (ref 164–446)
PMV BLD AUTO: 9.6 FL (ref 9–12.9)
RBC # BLD AUTO: 4.13 M/UL (ref 4.2–5.4)
WBC # BLD AUTO: 7.3 K/UL (ref 4.8–10.8)

## 2023-01-19 PROCEDURE — 82040 ASSAY OF SERUM ALBUMIN: CPT

## 2023-01-19 PROCEDURE — 82565 ASSAY OF CREATININE: CPT

## 2023-01-19 PROCEDURE — 85025 COMPLETE CBC W/AUTO DIFF WBC: CPT

## 2023-01-19 PROCEDURE — 36415 COLL VENOUS BLD VENIPUNCTURE: CPT

## 2023-01-19 PROCEDURE — 84450 TRANSFERASE (AST) (SGOT): CPT

## 2023-01-19 PROCEDURE — 85652 RBC SED RATE AUTOMATED: CPT

## 2023-01-19 PROCEDURE — 84460 ALANINE AMINO (ALT) (SGPT): CPT

## 2023-01-29 SDOH — ECONOMIC STABILITY: FOOD INSECURITY: WITHIN THE PAST 12 MONTHS, THE FOOD YOU BOUGHT JUST DIDN'T LAST AND YOU DIDN'T HAVE MONEY TO GET MORE.: NEVER TRUE

## 2023-01-29 SDOH — ECONOMIC STABILITY: HOUSING INSECURITY
IN THE LAST 12 MONTHS, WAS THERE A TIME WHEN YOU DID NOT HAVE A STEADY PLACE TO SLEEP OR SLEPT IN A SHELTER (INCLUDING NOW)?: NO

## 2023-01-29 SDOH — ECONOMIC STABILITY: INCOME INSECURITY: HOW HARD IS IT FOR YOU TO PAY FOR THE VERY BASICS LIKE FOOD, HOUSING, MEDICAL CARE, AND HEATING?: NOT VERY HARD

## 2023-01-29 SDOH — ECONOMIC STABILITY: FOOD INSECURITY: WITHIN THE PAST 12 MONTHS, YOU WORRIED THAT YOUR FOOD WOULD RUN OUT BEFORE YOU GOT MONEY TO BUY MORE.: NEVER TRUE

## 2023-01-29 SDOH — ECONOMIC STABILITY: TRANSPORTATION INSECURITY
IN THE PAST 12 MONTHS, HAS LACK OF TRANSPORTATION KEPT YOU FROM MEETINGS, WORK, OR FROM GETTING THINGS NEEDED FOR DAILY LIVING?: NO

## 2023-01-29 SDOH — HEALTH STABILITY: PHYSICAL HEALTH: ON AVERAGE, HOW MANY MINUTES DO YOU ENGAGE IN EXERCISE AT THIS LEVEL?: 70 MIN

## 2023-01-29 SDOH — ECONOMIC STABILITY: INCOME INSECURITY: IN THE LAST 12 MONTHS, WAS THERE A TIME WHEN YOU WERE NOT ABLE TO PAY THE MORTGAGE OR RENT ON TIME?: NO

## 2023-01-29 SDOH — HEALTH STABILITY: MENTAL HEALTH
STRESS IS WHEN SOMEONE FEELS TENSE, NERVOUS, ANXIOUS, OR CAN'T SLEEP AT NIGHT BECAUSE THEIR MIND IS TROUBLED. HOW STRESSED ARE YOU?: VERY MUCH

## 2023-01-29 SDOH — ECONOMIC STABILITY: TRANSPORTATION INSECURITY
IN THE PAST 12 MONTHS, HAS LACK OF RELIABLE TRANSPORTATION KEPT YOU FROM MEDICAL APPOINTMENTS, MEETINGS, WORK OR FROM GETTING THINGS NEEDED FOR DAILY LIVING?: NO

## 2023-01-29 SDOH — HEALTH STABILITY: PHYSICAL HEALTH: ON AVERAGE, HOW MANY DAYS PER WEEK DO YOU ENGAGE IN MODERATE TO STRENUOUS EXERCISE (LIKE A BRISK WALK)?: 6 DAYS

## 2023-01-29 SDOH — ECONOMIC STABILITY: TRANSPORTATION INSECURITY
IN THE PAST 12 MONTHS, HAS THE LACK OF TRANSPORTATION KEPT YOU FROM MEDICAL APPOINTMENTS OR FROM GETTING MEDICATIONS?: NO

## 2023-01-29 SDOH — ECONOMIC STABILITY: HOUSING INSECURITY: IN THE LAST 12 MONTHS, HOW MANY PLACES HAVE YOU LIVED?: 1

## 2023-01-29 ASSESSMENT — SOCIAL DETERMINANTS OF HEALTH (SDOH)
DO YOU BELONG TO ANY CLUBS OR ORGANIZATIONS SUCH AS CHURCH GROUPS UNIONS, FRATERNAL OR ATHLETIC GROUPS, OR SCHOOL GROUPS?: NO
WITHIN THE PAST 12 MONTHS, YOU WORRIED THAT YOUR FOOD WOULD RUN OUT BEFORE YOU GOT THE MONEY TO BUY MORE: NEVER TRUE
HOW OFTEN DO YOU ATTEND CHURCH OR RELIGIOUS SERVICES?: NEVER
IN A TYPICAL WEEK, HOW MANY TIMES DO YOU TALK ON THE PHONE WITH FAMILY, FRIENDS, OR NEIGHBORS?: THREE TIMES A WEEK
HOW OFTEN DO YOU HAVE SIX OR MORE DRINKS ON ONE OCCASION: NEVER
HOW OFTEN DO YOU HAVE A DRINK CONTAINING ALCOHOL: 2-4 TIMES A MONTH
DO YOU BELONG TO ANY CLUBS OR ORGANIZATIONS SUCH AS CHURCH GROUPS UNIONS, FRATERNAL OR ATHLETIC GROUPS, OR SCHOOL GROUPS?: NO
HOW OFTEN DO YOU ATTENT MEETINGS OF THE CLUB OR ORGANIZATION YOU BELONG TO?: NEVER
IN A TYPICAL WEEK, HOW MANY TIMES DO YOU TALK ON THE PHONE WITH FAMILY, FRIENDS, OR NEIGHBORS?: THREE TIMES A WEEK
HOW OFTEN DO YOU GET TOGETHER WITH FRIENDS OR RELATIVES?: ONCE A WEEK
HOW HARD IS IT FOR YOU TO PAY FOR THE VERY BASICS LIKE FOOD, HOUSING, MEDICAL CARE, AND HEATING?: NOT VERY HARD
HOW OFTEN DO YOU ATTEND CHURCH OR RELIGIOUS SERVICES?: NEVER
HOW MANY DRINKS CONTAINING ALCOHOL DO YOU HAVE ON A TYPICAL DAY WHEN YOU ARE DRINKING: 1 OR 2
HOW OFTEN DO YOU ATTENT MEETINGS OF THE CLUB OR ORGANIZATION YOU BELONG TO?: NEVER
HOW OFTEN DO YOU GET TOGETHER WITH FRIENDS OR RELATIVES?: ONCE A WEEK

## 2023-01-29 ASSESSMENT — LIFESTYLE VARIABLES
HOW OFTEN DO YOU HAVE A DRINK CONTAINING ALCOHOL: 2-4 TIMES A MONTH
HOW MANY STANDARD DRINKS CONTAINING ALCOHOL DO YOU HAVE ON A TYPICAL DAY: 1 OR 2
SKIP TO QUESTIONS 9-10: 1
HOW OFTEN DO YOU HAVE SIX OR MORE DRINKS ON ONE OCCASION: NEVER
AUDIT-C TOTAL SCORE: 2

## 2023-01-31 ENCOUNTER — OFFICE VISIT (OUTPATIENT)
Dept: MEDICAL GROUP | Facility: MEDICAL CENTER | Age: 43
End: 2023-01-31
Payer: COMMERCIAL

## 2023-01-31 VITALS
BODY MASS INDEX: 23.14 KG/M2 | WEIGHT: 138.89 LBS | TEMPERATURE: 97.5 F | HEIGHT: 65 IN | SYSTOLIC BLOOD PRESSURE: 110 MMHG | RESPIRATION RATE: 16 BRPM | OXYGEN SATURATION: 99 % | HEART RATE: 71 BPM | DIASTOLIC BLOOD PRESSURE: 62 MMHG

## 2023-01-31 DIAGNOSIS — M06.09 RHEUMATOID ARTHRITIS OF MULTIPLE SITES WITH NEGATIVE RHEUMATOID FACTOR (HCC): ICD-10-CM

## 2023-01-31 DIAGNOSIS — R23.2 HOT FLASHES: ICD-10-CM

## 2023-01-31 DIAGNOSIS — Z00.00 PREVENTATIVE HEALTH CARE: ICD-10-CM

## 2023-01-31 DIAGNOSIS — E03.9 HYPOTHYROIDISM, UNSPECIFIED TYPE: ICD-10-CM

## 2023-01-31 DIAGNOSIS — N92.6 IRREGULAR MENSES: ICD-10-CM

## 2023-01-31 DIAGNOSIS — R63.5 WEIGHT GAIN: ICD-10-CM

## 2023-01-31 PROCEDURE — 99214 OFFICE O/P EST MOD 30 MIN: CPT | Performed by: PHYSICIAN ASSISTANT

## 2023-01-31 RX ORDER — UPADACITINIB 15 MG/1
TABLET, EXTENDED RELEASE ORAL
COMMUNITY
Start: 2023-01-24

## 2023-01-31 ASSESSMENT — PATIENT HEALTH QUESTIONNAIRE - PHQ9: CLINICAL INTERPRETATION OF PHQ2 SCORE: 0

## 2023-01-31 ASSESSMENT — FIBROSIS 4 INDEX: FIB4 SCORE: 0.38

## 2023-01-31 NOTE — PROGRESS NOTES
"Chief Complaint   Patient presents with    Weight Gain     Discuss hormones        HPI  Sunshine Miller is a 42 y.o. female here today for weight gain and night sweats and irregular periods.    Patient states in the past couple of months she has had about 15 pounds weight gain, no changes in diet or physical activity, also has been getting hot flashes and night sweats that wakes her up at night, her periods is becoming irregular, gets them more frequently however lighter and shorter.  Does not know at what age her movements are menopause.  Currently no hair loss, no new fatigue or tiredness            Exam:  /62 (BP Location: Right arm, Patient Position: Sitting)   Pulse 71   Temp 36.4 °C (97.5 °F) (Temporal)   Resp 16   Ht 1.651 m (5' 5\")   Wt 63 kg (138 lb 14.2 oz)   SpO2 99%       Constitutional: Alert, oriented in no acute distress.  Psych: Eye contact is good, speech goal directed, affect calm  Eyes: Conjunctiva non-injected, sclera non-icteric.  Lungs: Unlabored respiratory effort, clear to auscultation bilaterally with good excursion, no wheez or rhonci  CV: regular rate and rhythm. No lower extremity edema        A/P:  1. Hypothyroidism, unspecified type  Patient with RA, under care of rheumatologist.  Concerned about Hashimoto's  - TSH; Future  - FREE THYROXINE; Future  - TRIIDOTHYRONINE; Future  - THYROID PEROXIDASE  (TPO) AB    2. Preventative health care    - Lipid Profile; Future  - Comp Metabolic Panel; Future  - VITAMIN D,25 HYDROXY (DEFICIENCY); Future  - VITAMIN B12; Future  - FOLATE; Future    3. Weight gain  Patient has had about 15 pounds weight gain.    4. Irregular menses  Menses are shorter and interval however lighter    5. Hot flashes        F/U: after blood work     "

## 2023-01-31 NOTE — Clinical Note
Do we have her last pap results? If not please let her know she is due and schedule.  Fe Ortiz P.A.-C.

## 2023-02-17 ENCOUNTER — HOSPITAL ENCOUNTER (OUTPATIENT)
Dept: LAB | Facility: MEDICAL CENTER | Age: 43
End: 2023-02-17
Attending: PHYSICIAN ASSISTANT
Payer: COMMERCIAL

## 2023-02-17 DIAGNOSIS — E03.9 HYPOTHYROIDISM, UNSPECIFIED TYPE: ICD-10-CM

## 2023-02-17 DIAGNOSIS — Z00.00 PREVENTATIVE HEALTH CARE: ICD-10-CM

## 2023-02-17 LAB
25(OH)D3 SERPL-MCNC: 59 NG/ML (ref 30–100)
ALBUMIN SERPL BCP-MCNC: 4.1 G/DL (ref 3.2–4.9)
ALBUMIN/GLOB SERPL: 1.6 G/DL
ALP SERPL-CCNC: 49 U/L (ref 30–99)
ALT SERPL-CCNC: 11 U/L (ref 2–50)
ANION GAP SERPL CALC-SCNC: 9 MMOL/L (ref 7–16)
AST SERPL-CCNC: 17 U/L (ref 12–45)
BILIRUB SERPL-MCNC: 0.2 MG/DL (ref 0.1–1.5)
BUN SERPL-MCNC: 20 MG/DL (ref 8–22)
CALCIUM ALBUM COR SERPL-MCNC: 8.7 MG/DL (ref 8.5–10.5)
CALCIUM SERPL-MCNC: 8.8 MG/DL (ref 8.5–10.5)
CHLORIDE SERPL-SCNC: 109 MMOL/L (ref 96–112)
CHOLEST SERPL-MCNC: 211 MG/DL (ref 100–199)
CO2 SERPL-SCNC: 22 MMOL/L (ref 20–33)
CREAT SERPL-MCNC: 0.78 MG/DL (ref 0.5–1.4)
FASTING STATUS PATIENT QL REPORTED: NORMAL
FOLATE SERPL-MCNC: 12.6 NG/ML
GFR SERPLBLD CREATININE-BSD FMLA CKD-EPI: 97 ML/MIN/1.73 M 2
GLOBULIN SER CALC-MCNC: 2.5 G/DL (ref 1.9–3.5)
GLUCOSE SERPL-MCNC: 85 MG/DL (ref 65–99)
HDLC SERPL-MCNC: 106 MG/DL
LDLC SERPL CALC-MCNC: 101 MG/DL
POTASSIUM SERPL-SCNC: 4.3 MMOL/L (ref 3.6–5.5)
PROT SERPL-MCNC: 6.6 G/DL (ref 6–8.2)
SODIUM SERPL-SCNC: 140 MMOL/L (ref 135–145)
T3 SERPL-MCNC: 64.6 NG/DL (ref 60–181)
T4 FREE SERPL-MCNC: 1.01 NG/DL (ref 0.93–1.7)
TRIGL SERPL-MCNC: 20 MG/DL (ref 0–149)
TSH SERPL DL<=0.005 MIU/L-ACNC: 0.89 UIU/ML (ref 0.38–5.33)
VIT B12 SERPL-MCNC: 970 PG/ML (ref 211–911)

## 2023-02-17 PROCEDURE — 84443 ASSAY THYROID STIM HORMONE: CPT

## 2023-02-17 PROCEDURE — 82746 ASSAY OF FOLIC ACID SERUM: CPT

## 2023-02-17 PROCEDURE — 80053 COMPREHEN METABOLIC PANEL: CPT

## 2023-02-17 PROCEDURE — 36415 COLL VENOUS BLD VENIPUNCTURE: CPT

## 2023-02-17 PROCEDURE — 84480 ASSAY TRIIODOTHYRONINE (T3): CPT

## 2023-02-17 PROCEDURE — 84439 ASSAY OF FREE THYROXINE: CPT

## 2023-02-17 PROCEDURE — 80061 LIPID PANEL: CPT

## 2023-02-17 PROCEDURE — 82306 VITAMIN D 25 HYDROXY: CPT

## 2023-02-17 PROCEDURE — 82607 VITAMIN B-12: CPT

## 2023-09-27 ENCOUNTER — HOSPITAL ENCOUNTER (OUTPATIENT)
Dept: LAB | Facility: MEDICAL CENTER | Age: 43
End: 2023-09-27
Attending: INTERNAL MEDICINE
Payer: COMMERCIAL

## 2023-09-27 LAB
ALBUMIN SERPL BCP-MCNC: 4.1 G/DL (ref 3.2–4.9)
ALT SERPL-CCNC: 17 U/L (ref 2–50)
AST SERPL-CCNC: 25 U/L (ref 12–45)
BASOPHILS # BLD AUTO: 0.8 % (ref 0–1.8)
BASOPHILS # BLD: 0.05 K/UL (ref 0–0.12)
CREAT SERPL-MCNC: 0.74 MG/DL (ref 0.5–1.4)
EOSINOPHIL # BLD AUTO: 0.08 K/UL (ref 0–0.51)
EOSINOPHIL NFR BLD: 1.3 % (ref 0–6.9)
ERYTHROCYTE [DISTWIDTH] IN BLOOD BY AUTOMATED COUNT: 50.6 FL (ref 35.9–50)
ERYTHROCYTE [SEDIMENTATION RATE] IN BLOOD BY WESTERGREN METHOD: 7 MM/HOUR (ref 0–25)
GFR SERPLBLD CREATININE-BSD FMLA CKD-EPI: 103 ML/MIN/1.73 M 2
HCT VFR BLD AUTO: 35.4 % (ref 37–47)
HGB BLD-MCNC: 10.9 G/DL (ref 12–16)
IMM GRANULOCYTES # BLD AUTO: 0.01 K/UL (ref 0–0.11)
IMM GRANULOCYTES NFR BLD AUTO: 0.2 % (ref 0–0.9)
LYMPHOCYTES # BLD AUTO: 2.06 K/UL (ref 1–4.8)
LYMPHOCYTES NFR BLD: 32.9 % (ref 22–41)
MCH RBC QN AUTO: 26 PG (ref 27–33)
MCHC RBC AUTO-ENTMCNC: 30.8 G/DL (ref 32.2–35.5)
MCV RBC AUTO: 84.5 FL (ref 81.4–97.8)
MONOCYTES # BLD AUTO: 0.37 K/UL (ref 0–0.85)
MONOCYTES NFR BLD AUTO: 5.9 % (ref 0–13.4)
NEUTROPHILS # BLD AUTO: 3.69 K/UL (ref 1.82–7.42)
NEUTROPHILS NFR BLD: 58.9 % (ref 44–72)
NRBC # BLD AUTO: 0 K/UL
NRBC BLD-RTO: 0 /100 WBC (ref 0–0.2)
PLATELET # BLD AUTO: 379 K/UL (ref 164–446)
PMV BLD AUTO: 10.6 FL (ref 9–12.9)
RBC # BLD AUTO: 4.19 M/UL (ref 4.2–5.4)
WBC # BLD AUTO: 6.3 K/UL (ref 4.8–10.8)

## 2023-09-27 PROCEDURE — 84460 ALANINE AMINO (ALT) (SGPT): CPT

## 2023-09-27 PROCEDURE — 82040 ASSAY OF SERUM ALBUMIN: CPT

## 2023-09-27 PROCEDURE — 84450 TRANSFERASE (AST) (SGOT): CPT

## 2023-09-27 PROCEDURE — 82565 ASSAY OF CREATININE: CPT

## 2023-09-27 PROCEDURE — 85025 COMPLETE CBC W/AUTO DIFF WBC: CPT

## 2023-09-27 PROCEDURE — 85652 RBC SED RATE AUTOMATED: CPT

## 2023-09-27 PROCEDURE — 36415 COLL VENOUS BLD VENIPUNCTURE: CPT

## 2024-03-15 ENCOUNTER — HOSPITAL ENCOUNTER (OUTPATIENT)
Dept: LAB | Facility: MEDICAL CENTER | Age: 44
End: 2024-03-15
Attending: INTERNAL MEDICINE
Payer: COMMERCIAL

## 2024-03-15 LAB
ALBUMIN SERPL BCP-MCNC: 4.4 G/DL (ref 3.2–4.9)
ALT SERPL-CCNC: 20 U/L (ref 2–50)
AST SERPL-CCNC: 21 U/L (ref 12–45)
BASOPHILS # BLD AUTO: 0.6 % (ref 0–1.8)
BASOPHILS # BLD: 0.03 K/UL (ref 0–0.12)
CREAT SERPL-MCNC: 0.74 MG/DL (ref 0.5–1.4)
EOSINOPHIL # BLD AUTO: 0.08 K/UL (ref 0–0.51)
EOSINOPHIL NFR BLD: 1.5 % (ref 0–6.9)
ERYTHROCYTE [DISTWIDTH] IN BLOOD BY AUTOMATED COUNT: 48.1 FL (ref 35.9–50)
ERYTHROCYTE [SEDIMENTATION RATE] IN BLOOD BY WESTERGREN METHOD: 7 MM/HOUR (ref 0–25)
GFR SERPLBLD CREATININE-BSD FMLA CKD-EPI: 103 ML/MIN/1.73 M 2
HCT VFR BLD AUTO: 37.9 % (ref 37–47)
HGB BLD-MCNC: 13.1 G/DL (ref 12–16)
IMM GRANULOCYTES # BLD AUTO: 0.01 K/UL (ref 0–0.11)
IMM GRANULOCYTES NFR BLD AUTO: 0.2 % (ref 0–0.9)
IRON SATN MFR SERPL: 32 % (ref 15–55)
IRON SERPL-MCNC: 105 UG/DL (ref 40–170)
LYMPHOCYTES # BLD AUTO: 1.71 K/UL (ref 1–4.8)
LYMPHOCYTES NFR BLD: 32.8 % (ref 22–41)
MCH RBC QN AUTO: 31.4 PG (ref 27–33)
MCHC RBC AUTO-ENTMCNC: 34.6 G/DL (ref 32.2–35.5)
MCV RBC AUTO: 90.9 FL (ref 81.4–97.8)
MONOCYTES # BLD AUTO: 0.34 K/UL (ref 0–0.85)
MONOCYTES NFR BLD AUTO: 6.5 % (ref 0–13.4)
NEUTROPHILS # BLD AUTO: 3.05 K/UL (ref 1.82–7.42)
NEUTROPHILS NFR BLD: 58.4 % (ref 44–72)
NRBC # BLD AUTO: 0 K/UL
NRBC BLD-RTO: 0 /100 WBC (ref 0–0.2)
PLATELET # BLD AUTO: 304 K/UL (ref 164–446)
PMV BLD AUTO: 10.2 FL (ref 9–12.9)
RBC # BLD AUTO: 4.17 M/UL (ref 4.2–5.4)
TIBC SERPL-MCNC: 331 UG/DL (ref 250–450)
UIBC SERPL-MCNC: 226 UG/DL (ref 110–370)
WBC # BLD AUTO: 5.2 K/UL (ref 4.8–10.8)

## 2024-03-15 PROCEDURE — 84460 ALANINE AMINO (ALT) (SGPT): CPT

## 2024-03-15 PROCEDURE — 82728 ASSAY OF FERRITIN: CPT

## 2024-03-15 PROCEDURE — 86706 HEP B SURFACE ANTIBODY: CPT

## 2024-03-15 PROCEDURE — 85025 COMPLETE CBC W/AUTO DIFF WBC: CPT

## 2024-03-15 PROCEDURE — 86803 HEPATITIS C AB TEST: CPT

## 2024-03-15 PROCEDURE — 82565 ASSAY OF CREATININE: CPT

## 2024-03-15 PROCEDURE — 83540 ASSAY OF IRON: CPT

## 2024-03-15 PROCEDURE — 87340 HEPATITIS B SURFACE AG IA: CPT

## 2024-03-15 PROCEDURE — 85652 RBC SED RATE AUTOMATED: CPT

## 2024-03-15 PROCEDURE — 84450 TRANSFERASE (AST) (SGOT): CPT

## 2024-03-15 PROCEDURE — 82040 ASSAY OF SERUM ALBUMIN: CPT

## 2024-03-15 PROCEDURE — 83550 IRON BINDING TEST: CPT

## 2024-03-15 PROCEDURE — 86704 HEP B CORE ANTIBODY TOTAL: CPT

## 2024-03-15 PROCEDURE — 36415 COLL VENOUS BLD VENIPUNCTURE: CPT

## 2024-03-15 PROCEDURE — 86480 TB TEST CELL IMMUN MEASURE: CPT

## 2024-03-16 LAB
FERRITIN SERPL-MCNC: 33.6 NG/ML (ref 10–291)
HBV CORE AB SERPL QL IA: NONREACTIVE
HBV SURFACE AB SERPL IA-ACNC: 1519 MIU/ML (ref 0–10)
HBV SURFACE AG SER QL: NORMAL
HCV AB SER QL: NORMAL

## 2024-03-18 LAB
GAMMA INTERFERON BACKGROUND BLD IA-ACNC: 0.03 IU/ML
M TB IFN-G BLD-IMP: NEGATIVE
M TB IFN-G CD4+ BCKGRND COR BLD-ACNC: 0.01 IU/ML
MITOGEN IGNF BCKGRD COR BLD-ACNC: 4.41 IU/ML
QFT TB2 - NIL TBQ2: 0.01 IU/ML

## 2024-09-04 ENCOUNTER — HOSPITAL ENCOUNTER (OUTPATIENT)
Dept: LAB | Facility: MEDICAL CENTER | Age: 44
End: 2024-09-04
Attending: INTERNAL MEDICINE
Payer: COMMERCIAL

## 2024-09-04 LAB
ALBUMIN SERPL BCP-MCNC: 4.2 G/DL (ref 3.2–4.9)
ALT SERPL-CCNC: 16 U/L (ref 2–50)
AST SERPL-CCNC: 20 U/L (ref 12–45)
BASOPHILS # BLD AUTO: 1.8 % (ref 0–1.8)
BASOPHILS # BLD: 0.06 K/UL (ref 0–0.12)
CREAT SERPL-MCNC: 0.76 MG/DL (ref 0.5–1.4)
EOSINOPHIL # BLD AUTO: 0.18 K/UL (ref 0–0.51)
EOSINOPHIL NFR BLD: 5.4 % (ref 0–6.9)
ERYTHROCYTE [DISTWIDTH] IN BLOOD BY AUTOMATED COUNT: 48.4 FL (ref 35.9–50)
ERYTHROCYTE [SEDIMENTATION RATE] IN BLOOD BY WESTERGREN METHOD: 4 MM/HOUR (ref 0–25)
GFR SERPLBLD CREATININE-BSD FMLA CKD-EPI: 100 ML/MIN/1.73 M 2
HCT VFR BLD AUTO: 39.8 % (ref 37–47)
HGB BLD-MCNC: 13.5 G/DL (ref 12–16)
IMM GRANULOCYTES # BLD AUTO: 0.01 K/UL (ref 0–0.11)
IMM GRANULOCYTES NFR BLD AUTO: 0.3 % (ref 0–0.9)
LYMPHOCYTES # BLD AUTO: 1.24 K/UL (ref 1–4.8)
LYMPHOCYTES NFR BLD: 37.1 % (ref 22–41)
MCH RBC QN AUTO: 31.3 PG (ref 27–33)
MCHC RBC AUTO-ENTMCNC: 33.9 G/DL (ref 32.2–35.5)
MCV RBC AUTO: 92.1 FL (ref 81.4–97.8)
MONOCYTES # BLD AUTO: 0.34 K/UL (ref 0–0.85)
MONOCYTES NFR BLD AUTO: 10.2 % (ref 0–13.4)
NEUTROPHILS # BLD AUTO: 1.51 K/UL (ref 1.82–7.42)
NEUTROPHILS NFR BLD: 45.2 % (ref 44–72)
NRBC # BLD AUTO: 0 K/UL
NRBC BLD-RTO: 0 /100 WBC (ref 0–0.2)
PLATELET # BLD AUTO: 243 K/UL (ref 164–446)
PMV BLD AUTO: 10.3 FL (ref 9–12.9)
RBC # BLD AUTO: 4.32 M/UL (ref 4.2–5.4)
WBC # BLD AUTO: 3.3 K/UL (ref 4.8–10.8)

## 2024-09-04 PROCEDURE — 85652 RBC SED RATE AUTOMATED: CPT

## 2024-09-04 PROCEDURE — 84460 ALANINE AMINO (ALT) (SGPT): CPT

## 2024-09-04 PROCEDURE — 82565 ASSAY OF CREATININE: CPT

## 2024-09-04 PROCEDURE — 82040 ASSAY OF SERUM ALBUMIN: CPT

## 2024-09-04 PROCEDURE — 84450 TRANSFERASE (AST) (SGOT): CPT

## 2024-09-04 PROCEDURE — 36415 COLL VENOUS BLD VENIPUNCTURE: CPT

## 2024-09-04 PROCEDURE — 85025 COMPLETE CBC W/AUTO DIFF WBC: CPT

## 2025-01-07 ENCOUNTER — OFFICE VISIT (OUTPATIENT)
Dept: MEDICAL GROUP | Facility: MEDICAL CENTER | Age: 45
End: 2025-01-07
Payer: COMMERCIAL

## 2025-01-07 VITALS
HEIGHT: 65 IN | WEIGHT: 130.6 LBS | SYSTOLIC BLOOD PRESSURE: 102 MMHG | HEART RATE: 63 BPM | OXYGEN SATURATION: 97 % | TEMPERATURE: 97.3 F | DIASTOLIC BLOOD PRESSURE: 60 MMHG | BODY MASS INDEX: 21.76 KG/M2

## 2025-01-07 DIAGNOSIS — D50.8 OTHER IRON DEFICIENCY ANEMIA: ICD-10-CM

## 2025-01-07 DIAGNOSIS — L65.9 HAIR LOSS: ICD-10-CM

## 2025-01-07 DIAGNOSIS — R63.5 WEIGHT GAIN: ICD-10-CM

## 2025-01-07 DIAGNOSIS — M06.09 RHEUMATOID ARTHRITIS OF MULTIPLE SITES WITH NEGATIVE RHEUMATOID FACTOR (HCC): ICD-10-CM

## 2025-01-07 DIAGNOSIS — Z00.00 PREVENTATIVE HEALTH CARE: ICD-10-CM

## 2025-01-07 PROCEDURE — 3078F DIAST BP <80 MM HG: CPT | Performed by: PHYSICIAN ASSISTANT

## 2025-01-07 PROCEDURE — 3074F SYST BP LT 130 MM HG: CPT | Performed by: PHYSICIAN ASSISTANT

## 2025-01-07 PROCEDURE — 99214 OFFICE O/P EST MOD 30 MIN: CPT | Performed by: PHYSICIAN ASSISTANT

## 2025-01-07 RX ORDER — SARILUMAB 150 MG/1.14ML
INJECTION, SOLUTION SUBCUTANEOUS
Status: SHIPPED
Start: 2025-01-07

## 2025-01-07 ASSESSMENT — FIBROSIS 4 INDEX: FIB4 SCORE: 0.88

## 2025-01-07 ASSESSMENT — PATIENT HEALTH QUESTIONNAIRE - PHQ9: CLINICAL INTERPRETATION OF PHQ2 SCORE: 0

## 2025-01-07 NOTE — PROGRESS NOTES
"Chief Complaint   Patient presents with    Other     Health concerns on thyroid, order labs       Other      Sunshine Miller is a 44 y.o. female here today for  History of Present Illness  hair loss, weight gain, fatigue, and f/u  rheumatoid arthritis.    She has been experiencing significant hair loss since mid-October 2024, with clumps of hair falling out during her weekly washes.   Concurrently, she has gained approximately 10 pounds over the past 3 months, despite maintaining her regular diet and exercise routine. She is interested in having her cholesterol levels checked.    She reports persistent fatigue, although there has been some improvement over the past month due to efforts to enhance her sleep habits. She does not believe she has sleep apnea or snores, but she uses a mouthguard at night.     Patient with history of iron deficiency anemia and also IBS.   her diet is primarily carnivorous, as she has found that vegetables exacerbate her irritable bowel syndrome (IBS).        She is currently on Kevzara, a biologic for her RA, administered as a self-injection every other week. She also takes sulfasalazine and a Chinese herb prescribed by her acupuncturist. She is under the care of Dr. Sera Chawla at [facility] for her RA.    Supplemental Information  She is scheduled to see her OB/GYN for a Pap smear in a couple of weeks and believes an order for a mammogram has already been placed.    SOCIAL HISTORY  She is working part-time managing an acupuncturist's office.    MEDICATIONS  Kevzara, sulfasalazine              Exam:  /60 (BP Location: Right arm, Patient Position: Sitting, BP Cuff Size: Adult)   Pulse 63   Temp 36.3 °C (97.3 °F) (Temporal)   Ht 1.651 m (5' 5\")   Wt 59.2 kg (130 lb 9.6 oz)   SpO2 97%       Constitutional: Alert, oriented in no acute distress.  Psych: Eye contact is good, speech goal directed, affect calm  Eyes: Conjunctiva non-injected, sclera non-icteric.  Lungs: " Unlabored respiratory effort, clear to auscultation bilaterally with good excursion, no wheez or rhonci  CV: regular rate and rhythm. No lower extremity edema        A/P:  1. Preventative health to rheumatology    - Comp Metabolic Panel; Future  - CBC WITH DIFFERENTIAL; Future  - Lipid Profile; Future  - VITAMIN D,25 HYDROXY (DEFICIENCY); Future  - TSH; Future  - FREE THYROXINE; Future  - TRIIDOTHYRONINE; Future    2. Other iron deficiency anemia  Chronic, patient does not report any annual SOB or CP, has had some fatigue however this is not new.  Menses regular, denies heavy bleeding  Check patient's iron levels  - IRON/TOTAL IRON BIND; Future  - FERRITIN; Future  - VITAMIN B12; Future  - FOLATE; Future    3. Hair loss    This is a new problem to discuss, we are checking patient thyroid specially since patient also has had weight gain    4. Rheumatoid arthritis of multiple sites with negative rheumatoid factor (HCC)  Chronic, stable, continues on Kevzara, continue care under rheumatology    5.  Weight gain  Patient has had some weight gain however BMI is still normal,   Does not report any changes in diet or lifestyle.  We are checking patient TSH, T3 and T4,         F/U:4 wks for annual exam

## 2025-01-14 ENCOUNTER — APPOINTMENT (OUTPATIENT)
Dept: MEDICAL GROUP | Facility: MEDICAL CENTER | Age: 45
End: 2025-01-14
Payer: COMMERCIAL

## 2025-01-15 ENCOUNTER — HOSPITAL ENCOUNTER (OUTPATIENT)
Dept: LAB | Facility: MEDICAL CENTER | Age: 45
End: 2025-01-15
Attending: PHYSICIAN ASSISTANT
Payer: COMMERCIAL

## 2025-01-15 DIAGNOSIS — Z00.00 PREVENTATIVE HEALTH CARE: ICD-10-CM

## 2025-01-15 DIAGNOSIS — D50.8 OTHER IRON DEFICIENCY ANEMIA: ICD-10-CM

## 2025-01-15 LAB
25(OH)D3 SERPL-MCNC: 65 NG/ML (ref 30–100)
ALBUMIN SERPL BCP-MCNC: 4.4 G/DL (ref 3.2–4.9)
ALBUMIN/GLOB SERPL: 2 G/DL
ALP SERPL-CCNC: 48 U/L (ref 30–99)
ALT SERPL-CCNC: 19 U/L (ref 2–50)
ANION GAP SERPL CALC-SCNC: 13 MMOL/L (ref 7–16)
AST SERPL-CCNC: 22 U/L (ref 12–45)
BASOPHILS # BLD AUTO: 1.3 % (ref 0–1.8)
BASOPHILS # BLD: 0.05 K/UL (ref 0–0.12)
BILIRUB SERPL-MCNC: 0.4 MG/DL (ref 0.1–1.5)
BUN SERPL-MCNC: 22 MG/DL (ref 8–22)
CALCIUM ALBUM COR SERPL-MCNC: 8.7 MG/DL (ref 8.5–10.5)
CALCIUM SERPL-MCNC: 9 MG/DL (ref 8.5–10.5)
CHLORIDE SERPL-SCNC: 100 MMOL/L (ref 96–112)
CHOLEST SERPL-MCNC: 584 MG/DL (ref 100–199)
CO2 SERPL-SCNC: 22 MMOL/L (ref 20–33)
CREAT SERPL-MCNC: 0.66 MG/DL (ref 0.5–1.4)
EOSINOPHIL # BLD AUTO: 0.25 K/UL (ref 0–0.51)
EOSINOPHIL NFR BLD: 6.3 % (ref 0–6.9)
ERYTHROCYTE [DISTWIDTH] IN BLOOD BY AUTOMATED COUNT: 47 FL (ref 35.9–50)
FERRITIN SERPL-MCNC: 30 NG/ML (ref 10–291)
FOLATE SERPL-MCNC: 5.6 NG/ML
GFR SERPLBLD CREATININE-BSD FMLA CKD-EPI: 111 ML/MIN/1.73 M 2
GLOBULIN SER CALC-MCNC: 2.2 G/DL (ref 1.9–3.5)
GLUCOSE SERPL-MCNC: 85 MG/DL (ref 65–99)
HCT VFR BLD AUTO: 38.4 % (ref 37–47)
HDLC SERPL-MCNC: 118 MG/DL
HGB BLD-MCNC: 12.6 G/DL (ref 12–16)
IMM GRANULOCYTES # BLD AUTO: 0.01 K/UL (ref 0–0.11)
IMM GRANULOCYTES NFR BLD AUTO: 0.3 % (ref 0–0.9)
IRON SATN MFR SERPL: 32 % (ref 15–55)
IRON SERPL-MCNC: 109 UG/DL (ref 40–170)
LDLC SERPL CALC-MCNC: 456 MG/DL
LYMPHOCYTES # BLD AUTO: 1.48 K/UL (ref 1–4.8)
LYMPHOCYTES NFR BLD: 37.5 % (ref 22–41)
MCH RBC QN AUTO: 30.4 PG (ref 27–33)
MCHC RBC AUTO-ENTMCNC: 32.8 G/DL (ref 32.2–35.5)
MCV RBC AUTO: 92.5 FL (ref 81.4–97.8)
MONOCYTES # BLD AUTO: 0.45 K/UL (ref 0–0.85)
MONOCYTES NFR BLD AUTO: 11.4 % (ref 0–13.4)
NEUTROPHILS # BLD AUTO: 1.71 K/UL (ref 1.82–7.42)
NEUTROPHILS NFR BLD: 43.2 % (ref 44–72)
NRBC # BLD AUTO: 0 K/UL
NRBC BLD-RTO: 0 /100 WBC (ref 0–0.2)
PLATELET # BLD AUTO: 283 K/UL (ref 164–446)
PMV BLD AUTO: 9.7 FL (ref 9–12.9)
POTASSIUM SERPL-SCNC: 4.1 MMOL/L (ref 3.6–5.5)
PROT SERPL-MCNC: 6.6 G/DL (ref 6–8.2)
RBC # BLD AUTO: 4.15 M/UL (ref 4.2–5.4)
SODIUM SERPL-SCNC: 135 MMOL/L (ref 135–145)
T3 SERPL-MCNC: 42.9 NG/DL (ref 60–181)
T4 FREE SERPL-MCNC: 1.02 NG/DL (ref 0.93–1.7)
TIBC SERPL-MCNC: 337 UG/DL (ref 250–450)
TRIGL SERPL-MCNC: 49 MG/DL (ref 0–149)
TSH SERPL-ACNC: 1.26 UIU/ML (ref 0.35–5.5)
UIBC SERPL-MCNC: 228 UG/DL (ref 110–370)
VIT B12 SERPL-MCNC: 1362 PG/ML (ref 211–911)
WBC # BLD AUTO: 4 K/UL (ref 4.8–10.8)

## 2025-01-15 PROCEDURE — 80061 LIPID PANEL: CPT

## 2025-01-15 PROCEDURE — 83550 IRON BINDING TEST: CPT

## 2025-01-15 PROCEDURE — 80053 COMPREHEN METABOLIC PANEL: CPT

## 2025-01-15 PROCEDURE — 36415 COLL VENOUS BLD VENIPUNCTURE: CPT

## 2025-01-15 PROCEDURE — 84443 ASSAY THYROID STIM HORMONE: CPT

## 2025-01-15 PROCEDURE — 82728 ASSAY OF FERRITIN: CPT

## 2025-01-15 PROCEDURE — 82746 ASSAY OF FOLIC ACID SERUM: CPT

## 2025-01-15 PROCEDURE — 84439 ASSAY OF FREE THYROXINE: CPT

## 2025-01-15 PROCEDURE — 82607 VITAMIN B-12: CPT

## 2025-01-15 PROCEDURE — 83540 ASSAY OF IRON: CPT

## 2025-01-15 PROCEDURE — 84480 ASSAY TRIIODOTHYRONINE (T3): CPT

## 2025-01-15 PROCEDURE — 85025 COMPLETE CBC W/AUTO DIFF WBC: CPT

## 2025-01-15 PROCEDURE — 82306 VITAMIN D 25 HYDROXY: CPT

## 2025-01-16 DIAGNOSIS — E78.00 ELEVATED LDL CHOLESTEROL LEVEL: ICD-10-CM

## 2025-01-29 ENCOUNTER — HOSPITAL ENCOUNTER (OUTPATIENT)
Dept: LAB | Facility: MEDICAL CENTER | Age: 45
End: 2025-01-29
Attending: NURSE PRACTITIONER
Payer: COMMERCIAL

## 2025-01-29 LAB
ALBUMIN SERPL BCP-MCNC: 4.5 G/DL (ref 3.2–4.9)
ALBUMIN/GLOB SERPL: 1.8 G/DL
ALP SERPL-CCNC: 48 U/L (ref 30–99)
ALT SERPL-CCNC: 13 U/L (ref 2–50)
ANION GAP SERPL CALC-SCNC: 14 MMOL/L (ref 7–16)
AST SERPL-CCNC: 16 U/L (ref 12–45)
BASOPHILS # BLD AUTO: 2 % (ref 0–1.8)
BASOPHILS # BLD: 0.08 K/UL (ref 0–0.12)
BILIRUB SERPL-MCNC: 0.5 MG/DL (ref 0.1–1.5)
BUN SERPL-MCNC: 25 MG/DL (ref 8–22)
CALCIUM ALBUM COR SERPL-MCNC: 8.8 MG/DL (ref 8.5–10.5)
CALCIUM SERPL-MCNC: 9.2 MG/DL (ref 8.5–10.5)
CHLORIDE SERPL-SCNC: 102 MMOL/L (ref 96–112)
CHOLEST SERPL-MCNC: 566 MG/DL (ref 100–199)
CO2 SERPL-SCNC: 20 MMOL/L (ref 20–33)
CREAT SERPL-MCNC: 0.71 MG/DL (ref 0.5–1.4)
EOSINOPHIL # BLD AUTO: 0.18 K/UL (ref 0–0.51)
EOSINOPHIL NFR BLD: 4.6 % (ref 0–6.9)
ERYTHROCYTE [DISTWIDTH] IN BLOOD BY AUTOMATED COUNT: 48.1 FL (ref 35.9–50)
GFR SERPLBLD CREATININE-BSD FMLA CKD-EPI: 108 ML/MIN/1.73 M 2
GLOBULIN SER CALC-MCNC: 2.5 G/DL (ref 1.9–3.5)
GLUCOSE SERPL-MCNC: 84 MG/DL (ref 65–99)
HCT VFR BLD AUTO: 39.8 % (ref 37–47)
HDLC SERPL-MCNC: 119 MG/DL
HGB BLD-MCNC: 13.2 G/DL (ref 12–16)
IMM GRANULOCYTES # BLD AUTO: 0 K/UL (ref 0–0.11)
IMM GRANULOCYTES NFR BLD AUTO: 0 % (ref 0–0.9)
LDLC SERPL CALC-MCNC: 436 MG/DL
LYMPHOCYTES # BLD AUTO: 1.33 K/UL (ref 1–4.8)
LYMPHOCYTES NFR BLD: 33.8 % (ref 22–41)
MCH RBC QN AUTO: 31.1 PG (ref 27–33)
MCHC RBC AUTO-ENTMCNC: 33.2 G/DL (ref 32.2–35.5)
MCV RBC AUTO: 93.6 FL (ref 81.4–97.8)
MONOCYTES # BLD AUTO: 0.56 K/UL (ref 0–0.85)
MONOCYTES NFR BLD AUTO: 14.2 % (ref 0–13.4)
NEUTROPHILS # BLD AUTO: 1.79 K/UL (ref 1.82–7.42)
NEUTROPHILS NFR BLD: 45.4 % (ref 44–72)
NRBC # BLD AUTO: 0 K/UL
NRBC BLD-RTO: 0 /100 WBC (ref 0–0.2)
PLATELET # BLD AUTO: 268 K/UL (ref 164–446)
PMV BLD AUTO: 9.7 FL (ref 9–12.9)
POTASSIUM SERPL-SCNC: 4.6 MMOL/L (ref 3.6–5.5)
PROT SERPL-MCNC: 7 G/DL (ref 6–8.2)
RBC # BLD AUTO: 4.25 M/UL (ref 4.2–5.4)
SODIUM SERPL-SCNC: 136 MMOL/L (ref 135–145)
T3FREE SERPL-MCNC: 1.41 PG/ML (ref 2–4.4)
T4 FREE SERPL-MCNC: 0.95 NG/DL (ref 0.93–1.7)
THYROPEROXIDASE AB SERPL-ACNC: 10 IU/ML (ref 0–9)
TRIGL SERPL-MCNC: 56 MG/DL (ref 0–149)
WBC # BLD AUTO: 3.9 K/UL (ref 4.8–10.8)

## 2025-01-29 PROCEDURE — 84445 ASSAY OF TSI GLOBULIN: CPT

## 2025-01-29 PROCEDURE — 84481 FREE ASSAY (FT-3): CPT

## 2025-01-29 PROCEDURE — 80053 COMPREHEN METABOLIC PANEL: CPT

## 2025-01-29 PROCEDURE — 80061 LIPID PANEL: CPT

## 2025-01-29 PROCEDURE — 36415 COLL VENOUS BLD VENIPUNCTURE: CPT

## 2025-01-29 PROCEDURE — 84439 ASSAY OF FREE THYROXINE: CPT

## 2025-01-29 PROCEDURE — 84443 ASSAY THYROID STIM HORMONE: CPT

## 2025-01-29 PROCEDURE — 86376 MICROSOMAL ANTIBODY EACH: CPT

## 2025-01-29 PROCEDURE — 84482 T3 REVERSE: CPT

## 2025-01-29 PROCEDURE — 85025 COMPLETE CBC W/AUTO DIFF WBC: CPT

## 2025-01-30 LAB — TSH SERPL-ACNC: 1.17 UIU/ML (ref 0.35–5.5)

## 2025-01-31 LAB — TSI SER-ACNC: <0.1 IU/L

## 2025-02-03 LAB — T3REVERSE SERPL-MCNC: 19.8 NG/DL (ref 9–27)

## 2025-04-05 ENCOUNTER — HOSPITAL ENCOUNTER (OUTPATIENT)
Dept: LAB | Facility: MEDICAL CENTER | Age: 45
End: 2025-04-05
Attending: INTERNAL MEDICINE
Payer: COMMERCIAL

## 2025-04-05 LAB
CRP SERPL HS-MCNC: <0.3 MG/DL (ref 0–0.75)
ERYTHROCYTE [SEDIMENTATION RATE] IN BLOOD BY WESTERGREN METHOD: 4 MM/HOUR (ref 0–25)
HBV CORE AB SERPL QL IA: NONREACTIVE
HBV SURFACE AB SERPL IA-ACNC: 411 MIU/ML (ref 0–10)
HBV SURFACE AG SER QL: NORMAL
HCV AB SER QL: NORMAL

## 2025-04-05 PROCEDURE — 85652 RBC SED RATE AUTOMATED: CPT

## 2025-04-05 PROCEDURE — 86706 HEP B SURFACE ANTIBODY: CPT

## 2025-04-05 PROCEDURE — 86480 TB TEST CELL IMMUN MEASURE: CPT

## 2025-04-05 PROCEDURE — 86140 C-REACTIVE PROTEIN: CPT

## 2025-04-05 PROCEDURE — 86803 HEPATITIS C AB TEST: CPT

## 2025-04-05 PROCEDURE — 36415 COLL VENOUS BLD VENIPUNCTURE: CPT

## 2025-04-05 PROCEDURE — 86704 HEP B CORE ANTIBODY TOTAL: CPT

## 2025-04-05 PROCEDURE — 87340 HEPATITIS B SURFACE AG IA: CPT

## 2025-04-07 LAB
GAMMA INTERFERON BACKGROUND BLD IA-ACNC: 0.04 IU/ML
M TB IFN-G BLD-IMP: NEGATIVE
M TB IFN-G CD4+ BCKGRND COR BLD-ACNC: 0.03 IU/ML
MITOGEN IGNF BCKGRD COR BLD-ACNC: >10 IU/ML
QFT TB2 - NIL TBQ2: 0.05 IU/ML

## 2025-05-10 ENCOUNTER — HOSPITAL ENCOUNTER (OUTPATIENT)
Dept: LAB | Facility: MEDICAL CENTER | Age: 45
End: 2025-05-10
Attending: NURSE PRACTITIONER
Payer: COMMERCIAL

## 2025-05-10 LAB
FERRITIN SERPL-MCNC: 11.4 NG/ML (ref 10–291)
T3FREE SERPL-MCNC: 2.32 PG/ML (ref 2–4.4)
T4 FREE SERPL-MCNC: 1.22 NG/DL (ref 0.93–1.7)
TSH SERPL-ACNC: 0.61 UIU/ML (ref 0.38–5.33)

## 2025-05-10 PROCEDURE — 36415 COLL VENOUS BLD VENIPUNCTURE: CPT

## 2025-05-10 PROCEDURE — 80061 LIPID PANEL: CPT

## 2025-05-10 PROCEDURE — 82728 ASSAY OF FERRITIN: CPT

## 2025-05-10 PROCEDURE — 84443 ASSAY THYROID STIM HORMONE: CPT

## 2025-05-10 PROCEDURE — 84439 ASSAY OF FREE THYROXINE: CPT

## 2025-05-10 PROCEDURE — 84481 FREE ASSAY (FT-3): CPT

## 2025-05-10 PROCEDURE — 86800 THYROGLOBULIN ANTIBODY: CPT

## 2025-05-10 PROCEDURE — 83704 LIPOPROTEIN BLD QUAN PART: CPT

## 2025-05-12 LAB — THYROGLOB AB SERPL-ACNC: <1.5 IU/ML (ref 0–4)

## 2025-05-13 LAB
CHOLEST SERPL-MCNC: 289 MG/DL
HDL PARTICAL NO Q4363: >41 UMOL/L
HDL SIZE Q4361: 10.3 NM
HDLC SERPL-MCNC: 92 MG/DL (ref 40–59)
HLD.LARGE SERPL-SCNC: >17 UMOL/L
L VLDL PART NO Q4357: 1.9 NMOL/L
LDL SERPL QN: 21.4 NM
LDL SERPL-SCNC: 1914 NMOL/L
LDL SMALL SERPL-SCNC: 266 NMOL/L
LDLC SERPL CALC-MCNC: 189 MG/DL
PATHOLOGY STUDY: ABNORMAL
TRIGL SERPL-MCNC: 40 MG/DL (ref 30–149)
VLDL SIZE Q4362: 45.2 NM

## 2025-06-16 NOTE — TELEPHONE ENCOUNTER
1. Caller Name: Sunshine                                         Call Back Number: [phone] (home)       Patient approves a detailed voicemail message: no and N\A    2. SPECIFIC Action To Be Taken: Referral pending, please sign.    3. Diagnosis/Clinical Reason for Request: Hemorids and bleeding     4. Specialty & Provider Name/Lab/Imaging Location: [facility]    5. Is appointment scheduled for requested order/referral: yes - 6/28/18    Patient was informed they will receive a return phone call from the office ONLY if there are any questions before processing their request. Advised to call back if they haven't received a call from the referral department in 5 days.     [facility] Interventional Pain Medicine     Interventional Procedure Note    Procedure:   Therapeutic& Diagnostic Injection  Lumbar epidural steroid injection    Level: R PM L5-S1    Approach: Intralaminar     Procedure Date: 2025  Patient Name: Pito Chacko  : 1944  Allergies: ALLERGIES:  Latex and Latex   (environmental)  Patient allergies confirmed: Yes    Time Out Performed: Yes  Attending Physician present for time-out: Rosendo Gonzalez MD  Performed by Rosendo Gonzalez MD, Xray-technicianObtained and Documented: Confirmed  Correct patient: Confirmed  Correct procedure: Confirmed  Correct side verified and marked: Confirmed  Correct patient position: Confirmed  Correct side/site marking visible:Confirmed  Correct images, implants, and equipment available: Confirmed  Fire risk assessment and special precautions taken: Yes  Code Status addressed: Yes  Antibiotic Dose: Not applicable for this procedure.  Beta blocker: Verified took home dose if part of patient home medication list.  Team invited to voice any concerns: Yes    Patient Position: Prone  Sterile prep with: Chloroprep and draped in the standard fashion  Desired interspace identified under fluoroscopic guidance  Lidocaine 1% administered at the skin  Needle advanced under fluoroscopic guidance until loss of resistance obtained    Depth to Loss of Resistance: n/a cm    Needle(s):  Length: 20g Tuohy  Fluoroscopy used for image guidance for this procedures  Fluoro time & dose: Please see epic  The injection was begun by anesthetizing the skin and soft tissues with approximately 5 mL of 1% preservative-free lidocaine using a sterile 27 gauge, 1.5 inch needle. Using fluoroscopic guidance, a sterile 20 gauge 3.5 inch Touhy needle was then positioned into targeted level. Using PARISH technique, epidural space was accessed. Precise needle placement was confirmed with 3 mL of Isovue-180 contrast dye injected under live fluoroscopy. There was no  intravascular uptake or evidence of intrathecal flow. There was flow observed into the epidural space. The following solution was then injected: 80mg of Depomedrol mixed with 1ml of 1% lidocaine and 1mL preservative free saline. The needle was withdrawn uneventfully and a sterile bandage applied.  Images saved (Epic)    Pre-Injection VAS 8  Post-Injection VAS 2  80% pain relief following injection    DRUGS ADMINISTERED: 80mg Depomedrol Epidural, 3cc Isovue 180, 3cc Normal Saline    AdditionalDrugs/Substances Administered: 1% lidocaine 3 cc for local infiltration  Anesthesia for procedure: Local; Pulse oximetry,NIBP monitoring for entire procedure; No sedation administered  Please refer to flow sheets for VS      SIGN OUT:  Final Procedure performed as above: Yes  Complications: None  Specimens removed: NA  Specimen labeled with correct patient identifiers: NA  Correct specimen testing ordered: NA  Condition on Discharge: Stable and unchanged from admission    Disposition/Discharge: Home  Post-op or Final Diagnosis: Lumbar Radiculitis/ Radiculopathy   Post-Procedure Instructions with Emergency Contact Information Were Given to Patient Prior to Discharge, patient or caregiver acknowledged receipt.

## 2025-06-20 ENCOUNTER — APPOINTMENT (OUTPATIENT)
Dept: URBAN - METROPOLITAN AREA CLINIC 4 | Facility: CLINIC | Age: 45
Setting detail: DERMATOLOGY
End: 2025-06-20

## 2025-06-20 DIAGNOSIS — L81.4 OTHER MELANIN HYPERPIGMENTATION: ICD-10-CM

## 2025-06-20 DIAGNOSIS — L82.1 OTHER SEBORRHEIC KERATOSIS: ICD-10-CM

## 2025-06-20 DIAGNOSIS — D485 NEOPLASM OF UNCERTAIN BEHAVIOR OF SKIN: ICD-10-CM

## 2025-06-20 DIAGNOSIS — Z71.89 OTHER SPECIFIED COUNSELING: ICD-10-CM

## 2025-06-20 DIAGNOSIS — D18.0 HEMANGIOMA: ICD-10-CM

## 2025-06-20 DIAGNOSIS — D22 MELANOCYTIC NEVI: ICD-10-CM

## 2025-06-20 DIAGNOSIS — L85.3 XEROSIS CUTIS: ICD-10-CM

## 2025-06-20 PROBLEM — D22.5 MELANOCYTIC NEVI OF TRUNK: Status: ACTIVE | Noted: 2025-06-20

## 2025-06-20 PROBLEM — D48.5 NEOPLASM OF UNCERTAIN BEHAVIOR OF SKIN: Status: ACTIVE | Noted: 2025-06-20

## 2025-06-20 PROBLEM — D18.01 HEMANGIOMA OF SKIN AND SUBCUTANEOUS TISSUE: Status: ACTIVE | Noted: 2025-06-20

## 2025-06-20 PROCEDURE — ? COUNSELING

## 2025-06-20 PROCEDURE — ? BIOPSY BY SHAVE METHOD

## 2025-06-20 PROCEDURE — ? ADDITIONAL NOTES

## 2025-06-20 ASSESSMENT — LOCATION SIMPLE DESCRIPTION DERM
LOCATION SIMPLE: RIGHT PRETIBIAL REGION
LOCATION SIMPLE: CHEST
LOCATION SIMPLE: RIGHT UPPER BACK
LOCATION SIMPLE: RIGHT CHEEK
LOCATION SIMPLE: UPPER BACK
LOCATION SIMPLE: LEFT PRETIBIAL REGION
LOCATION SIMPLE: LEFT THIGH

## 2025-06-20 ASSESSMENT — LOCATION DETAILED DESCRIPTION DERM
LOCATION DETAILED: RIGHT MEDIAL UPPER BACK
LOCATION DETAILED: RIGHT PROXIMAL PRETIBIAL REGION
LOCATION DETAILED: LEFT PROXIMAL PRETIBIAL REGION
LOCATION DETAILED: UPPER STERNUM
LOCATION DETAILED: LEFT MEDIAL SUPERIOR CHEST
LOCATION DETAILED: RIGHT SUPERIOR MEDIAL UPPER BACK
LOCATION DETAILED: LEFT ANTERIOR DISTAL THIGH
LOCATION DETAILED: INFERIOR THORACIC SPINE
LOCATION DETAILED: RIGHT CENTRAL BUCCAL CHEEK

## 2025-06-20 ASSESSMENT — LOCATION ZONE DERM
LOCATION ZONE: TRUNK
LOCATION ZONE: LEG
LOCATION ZONE: FACE

## 2025-07-08 ENCOUNTER — HOSPITAL ENCOUNTER (OUTPATIENT)
Dept: LAB | Facility: MEDICAL CENTER | Age: 45
End: 2025-07-08
Attending: INTERNAL MEDICINE
Payer: COMMERCIAL

## 2025-07-08 LAB
ALT SERPL-CCNC: 10 U/L (ref 2–50)
AST SERPL-CCNC: 15 U/L (ref 12–45)
BASOPHILS # BLD AUTO: 0.8 % (ref 0–1.8)
BASOPHILS # BLD: 0.06 K/UL (ref 0–0.12)
CREAT SERPL-MCNC: 0.95 MG/DL (ref 0.5–1.4)
EOSINOPHIL # BLD AUTO: 0.23 K/UL (ref 0–0.51)
EOSINOPHIL NFR BLD: 3.1 % (ref 0–6.9)
ERYTHROCYTE [DISTWIDTH] IN BLOOD BY AUTOMATED COUNT: 44.3 FL (ref 35.9–50)
ERYTHROCYTE [SEDIMENTATION RATE] IN BLOOD BY WESTERGREN METHOD: 16 MM/HOUR (ref 0–25)
GFR SERPLBLD CREATININE-BSD FMLA CKD-EPI: 76 ML/MIN/1.73 M 2
HCT VFR BLD AUTO: 34.8 % (ref 37–47)
HGB BLD-MCNC: 11.2 G/DL (ref 12–16)
IMM GRANULOCYTES # BLD AUTO: 0.03 K/UL (ref 0–0.11)
IMM GRANULOCYTES NFR BLD AUTO: 0.4 % (ref 0–0.9)
LYMPHOCYTES # BLD AUTO: 1.76 K/UL (ref 1–4.8)
LYMPHOCYTES NFR BLD: 23.7 % (ref 22–41)
MCH RBC QN AUTO: 27.9 PG (ref 27–33)
MCHC RBC AUTO-ENTMCNC: 32.2 G/DL (ref 32.2–35.5)
MCV RBC AUTO: 86.8 FL (ref 81.4–97.8)
MONOCYTES # BLD AUTO: 0.66 K/UL (ref 0–0.85)
MONOCYTES NFR BLD AUTO: 8.9 % (ref 0–13.4)
NEUTROPHILS # BLD AUTO: 4.68 K/UL (ref 1.82–7.42)
NEUTROPHILS NFR BLD: 63.1 % (ref 44–72)
NRBC # BLD AUTO: 0 K/UL
NRBC BLD-RTO: 0 /100 WBC (ref 0–0.2)
PLATELET # BLD AUTO: 418 K/UL (ref 164–446)
PMV BLD AUTO: 9.7 FL (ref 9–12.9)
RBC # BLD AUTO: 4.01 M/UL (ref 4.2–5.4)
WBC # BLD AUTO: 7.4 K/UL (ref 4.8–10.8)

## 2025-07-08 PROCEDURE — 82565 ASSAY OF CREATININE: CPT

## 2025-07-08 PROCEDURE — 85025 COMPLETE CBC W/AUTO DIFF WBC: CPT

## 2025-07-08 PROCEDURE — 84460 ALANINE AMINO (ALT) (SGPT): CPT

## 2025-07-08 PROCEDURE — 85652 RBC SED RATE AUTOMATED: CPT

## 2025-07-08 PROCEDURE — 36415 COLL VENOUS BLD VENIPUNCTURE: CPT

## 2025-07-08 PROCEDURE — 81382 HLA II TYPING 1 LOC HR: CPT

## 2025-07-08 PROCEDURE — 84450 TRANSFERASE (AST) (SGOT): CPT

## 2025-07-16 ENCOUNTER — APPOINTMENT (OUTPATIENT)
Dept: URBAN - METROPOLITAN AREA CLINIC 15 | Facility: CLINIC | Age: 45
Setting detail: DERMATOLOGY
End: 2025-07-16

## 2025-07-16 PROBLEM — D03.72 MELANOMA IN SITU OF LEFT LOWER LIMB, INCLUDING HIP: Status: ACTIVE | Noted: 2025-07-16

## 2025-07-16 PROCEDURE — ? COUNSELING

## 2025-07-16 PROCEDURE — ? EXCISION

## 2025-07-16 NOTE — PROCEDURE: EXCISION
Referring Physician (Optional): Susana Cesar PA-C
Surgeon (Optional): Sveta
Biopsy Photograph Reviewed: Yes
Previous Accession (Optional): [id number].
Date Of Previous Biopsy (Optional): 6/20/2025
Size Of Lesion In Cm: 2.6
Size Of Margin In Cm: 0.5
Anesthesia Volume In Cc: 17.7
Was An Eye Clamp Used?: No
Eye Clamp Note Details: An eye clamp was used during the procedure.
Excision Method: Elliptical
Saucerization Depth: dermis and superficial adipose tissue
Repair Type: Complex
Intermediate / Complex Repair - Final Wound Length In Cm: 9
Deep Sutures: 2-0 Vicryl
Epidermal Sutures: 4-0 Vicryl Rapide
Suturegard Retention Suture: 2-0 Nylon
Retention Suture Bite Size: 3 mm
Length To Time In Minutes Device Was In Place: 10
Number Of Hemigard Strips Per Side: 1
Width Of Defect Perpendicular To Closure In Cm (Required): 2.9
Distance Of Undermining In Cm (Required): 3.2
Undermining Type: Entire Wound
Debridement Text: The wound edges were debrided prior to proceeding with the closure to facilitate wound healing.
Helical Rim Text: The closure involved the helical rim.
Vermilion Border Text: The closure involved the vermilion border.
Nostril Rim Text: The closure involved the nostril rim.
Retention Suture Text: Retention sutures were placed to support the closure and prevent dehiscence.
Primary Defect Length (In Cm): 0
Lab: [id number]
Lab Facility: 
Graft Donor Site Bandage (Optional-Leave Blank If You Don't Want In Note): Steri-strips and a pressure bandage were applied to the donor site.
Epidermal Closure Graft Donor Site (Optional): simple interrupted
Billing Type: Third-Party Bill
Excision Depth: adipose tissue
Scalpel Size: 15 blade
Anesthesia Type: 1% lidocaine with epinephrine
Additional Anesthesia Volume In Cc: 6
Hemostasis: Electrocautery
Estimated Blood Loss (Cc): minimal
Detail Level: Detailed
Repair Depth: use same depth as excision depth
Anesthesia Volume In Cc: 17.3
Dermal Closure: buried vertical mattress
Epidermal Closure: running subcuticular
Wound Care: Bacitracin
Dressing: dry sterile dressing
Suturegard Intro: Intraoperative tissue expansion was performed, utilizing the SUTUREGARD device, in order to reduce wound tension.
Suturegard Body: The suture ends were repeatedly re-tightened and re-clamped to achieve the desired tissue expansion.
Hemigard Intro: Due to skin fragility and wound tension, it was decided to use HEMIGARD adhesive retention suture devices to permit a linear closure. The skin was cleaned and dried for a 6cm distance away from the wound. Excessive hair, if present, was removed to allow for adhesion.
Hemigard Postcare Instructions: The HEMIGARD strips are to remain completely dry for at least 5-7 days.
Positioning (Leave Blank If You Do Not Want): The patient was placed in a comfortable position exposing the surgical site.
Pre-Excision Curettage Text (Leave Blank If You Do Not Want): Prior to drawing the surgical margin the visible lesion was removed with electrodesiccation and curettage to clearly define the lesion size.
Complex Repair Preamble Text (Leave Blank If You Do Not Want): Extensive wide undermining was performed.
Intermediate Repair Preamble Text (Leave Blank If You Do Not Want): Undermining was performed with blunt dissection.
Curvilinear Excision Additional Text (Leave Blank If You Do Not Want): The margin was drawn around the clinically apparent lesion.  A curvilinear shape was then drawn on the skin incorporating the lesion and margins.  Incisions were then made along these lines to the appropriate tissue plane and the lesion was extirpated.
Fusiform Excision Additional Text (Leave Blank If You Do Not Want): The margin was drawn around the clinically apparent lesion.  A fusiform shape was then drawn on the skin incorporating the lesion and margins.  Incisions were then made along these lines to the appropriate tissue plane and the lesion was extirpated.
Elliptical Excision Additional Text (Leave Blank If You Do Not Want): The margin was drawn around the clinically apparent lesion.  An elliptical shape was then drawn on the skin incorporating the lesion and margins.  Incisions were then made along these lines to the appropriate tissue plane and the lesion was extirpated.
Saucerization Excision Additional Text (Leave Blank If You Do Not Want): The margin was drawn around the clinically apparent lesion.  Incisions were then made along these lines, in a tangential fashion, to the appropriate tissue plane and the lesion was extirpated.
Slit Excision Additional Text (Leave Blank If You Do Not Want): A linear line was drawn on the skin overlying the lesion. An incision was made slowly until the lesion was visualized.  Once visualized, the lesion was removed with blunt dissection.
Excisional Biopsy Additional Text (Leave Blank If You Do Not Want): The margin was drawn around the clinically apparent lesion. An elliptical shape was then drawn on the skin incorporating the lesion and margins.  Incisions were then made along these lines to the appropriate tissue plane and the lesion was extirpated.
Perilesional Excision Additional Text (Leave Blank If You Do Not Want): The margin was drawn around the clinically apparent lesion. Incisions were then made along these lines to the appropriate tissue plane and the lesion was extirpated.
Repair Performed By Another Provider Text (Leave Blank If You Do Not Want): After the tissue was excised the defect was repaired by another provider.
No Repair - Repaired With Adjacent Surgical Defect Text (Leave Blank If You Do Not Want): After the excision the defect was repaired concurrently with another surgical defect which was in close approximation.
Adjacent Tissue Transfer Text: The defect edges were debeveled with a #15 scalpel blade. Given the location of the defect and the proximity to free margins an adjacent tissue transfer was deemed most appropriate. Using a sterile surgical marker, an appropriate flap was drawn incorporating the defect and placing the expected incisions within the relaxed skin tension lines where possible. The area thus outlined was incised deep to adipose tissue with a #15 scalpel blade. The skin margins were undermined to an appropriate distance in all directions utilizing iris scissors and carried over to close the primary defect.
Advancement Flap (Single) Text: The defect edges were debeveled with a #15 scalpel blade.  Given the location of the defect and the proximity to free margins a single advancement flap was deemed most appropriate.  Using a sterile surgical marker, an appropriate advancement flap was drawn incorporating the defect and placing the expected incisions within the relaxed skin tension lines where possible.    The area thus outlined was incised deep to adipose tissue with a #15 scalpel blade.  The skin margins were undermined to an appropriate distance in all directions utilizing iris scissors.
Advancement Flap (Double) Text: The defect edges were debeveled with a #15 scalpel blade.  Given the location of the defect and the proximity to free margins a double advancement flap was deemed most appropriate.  Using a sterile surgical marker, the appropriate advancement flaps were drawn incorporating the defect and placing the expected incisions within the relaxed skin tension lines where possible.    The area thus outlined was incised deep to adipose tissue with a #15 scalpel blade.  The skin margins were undermined to an appropriate distance in all directions utilizing iris scissors.
Burow's Advancement Flap Text: The defect edges were debeveled with a #15 scalpel blade.  Given the location of the defect and the proximity to free margins a Burow's advancement flap was deemed most appropriate.  Using a sterile surgical marker, the appropriate advancement flap was drawn incorporating the defect and placing the expected incisions within the relaxed skin tension lines where possible.    The area thus outlined was incised deep to adipose tissue with a #15 scalpel blade.  The skin margins were undermined to an appropriate distance in all directions utilizing iris scissors.
Chonodrocutaneous Helical Advancement Flap Text: The defect edges were debeveled with a #15 scalpel blade. Given the location of the defect and the proximity to free margins a chondrocutaneous helical advancement flap was deemed most appropriate. Using a sterile surgical marker, the appropriate advancement flap was drawn incorporating the defect and placing the expected incisions within the relaxed skin tension lines where possible. The area thus outlined was incised deep to adipose tissue with a #15 scalpel blade. The skin margins were undermined to an appropriate distance in all directions utilizing iris scissors. Following this, the designed flap was advanced and carried over into the primary defect and sutured into place.
Crescentic Advancement Flap Text: The defect edges were debeveled with a #15 scalpel blade.  Given the location of the defect and the proximity to free margins a crescentic advancement flap was deemed most appropriate.  Using a sterile surgical marker, the appropriate advancement flap was drawn incorporating the defect and placing the expected incisions within the relaxed skin tension lines where possible.    The area thus outlined was incised deep to adipose tissue with a #15 scalpel blade.  The skin margins were undermined to an appropriate distance in all directions utilizing iris scissors.
A-T Advancement Flap Text: The defect edges were debeveled with a #15 scalpel blade.  Given the location of the defect, shape of the defect and the proximity to free margins an A-T advancement flap was deemed most appropriate.  Using a sterile surgical marker, an appropriate advancement flap was drawn incorporating the defect and placing the expected incisions within the relaxed skin tension lines where possible.    The area thus outlined was incised deep to adipose tissue with a #15 scalpel blade.  The skin margins were undermined to an appropriate distance in all directions utilizing iris scissors.
O-T Advancement Flap Text: The defect edges were debeveled with a #15 scalpel blade.  Given the location of the defect, shape of the defect and the proximity to free margins an O-T advancement flap was deemed most appropriate.  Using a sterile surgical marker, an appropriate advancement flap was drawn incorporating the defect and placing the expected incisions within the relaxed skin tension lines where possible.    The area thus outlined was incised deep to adipose tissue with a #15 scalpel blade.  The skin margins were undermined to an appropriate distance in all directions utilizing iris scissors.
O-L Flap Text: The defect edges were debeveled with a #15 scalpel blade.  Given the location of the defect, shape of the defect and the proximity to free margins an O-L flap was deemed most appropriate.  Using a sterile surgical marker, an appropriate advancement flap was drawn incorporating the defect and placing the expected incisions within the relaxed skin tension lines where possible.    The area thus outlined was incised deep to adipose tissue with a #15 scalpel blade.  The skin margins were undermined to an appropriate distance in all directions utilizing iris scissors.
O-Z Flap Text: The defect edges were debeveled with a #15 scalpel blade. Given the location of the defect, shape of the defect and the proximity to free margins an O-Z flap was deemed most appropriate. Using a sterile surgical marker, an appropriate transposition flap was drawn incorporating the defect and placing the expected incisions within the relaxed skin tension lines where possible. The area thus outlined was incised deep to adipose tissue with a #15 scalpel blade. The skin margins were undermined to an appropriate distance in all directions utilizing iris scissors. Following this, the designed flap was carried over into the primary defect and sutured into place.
Double O-Z Flap Text: The defect edges were debeveled with a #15 scalpel blade. Given the location of the defect, shape of the defect and the proximity to free margins a Double O-Z flap was deemed most appropriate. Using a sterile surgical marker, an appropriate transposition flap was drawn incorporating the defect and placing the expected incisions within the relaxed skin tension lines where possible. The area thus outlined was incised deep to adipose tissue with a #15 scalpel blade. The skin margins were undermined to an appropriate distance in all directions utilizing iris scissors. Following this, the designed flap was carried over into the primary defect and sutured into place.
V-Y Flap Text: The defect edges were debeveled with a #15 scalpel blade.  Given the location of the defect, shape of the defect and the proximity to free margins a V-Y flap was deemed most appropriate.  Using a sterile surgical marker, an appropriate advancement flap was drawn incorporating the defect and placing the expected incisions within the relaxed skin tension lines where possible.    The area thus outlined was incised deep to adipose tissue with a #15 scalpel blade.  The skin margins were undermined to an appropriate distance in all directions utilizing iris scissors.
Advancement-Rotation Flap Text: The defect edges were debeveled with a #15 scalpel blade.  Given the location of the defect, shape of the defect and the proximity to free margins an advancement-rotation flap was deemed most appropriate.  Using a sterile surgical marker, an appropriate flap was drawn incorporating the defect and placing the expected incisions within the relaxed skin tension lines where possible. The area thus outlined was incised deep to adipose tissue with a #15 scalpel blade.  The skin margins were undermined to an appropriate distance in all directions utilizing iris scissors.
Mercedes Flap Text: The defect edges were debeveled with a #15 scalpel blade. Given the location of the defect, shape of the defect and the proximity to free margins a Mercedes flap was deemed most appropriate. Using a sterile surgical marker, an appropriate advancement flap was drawn incorporating the defect and placing the expected incisions within the relaxed skin tension lines where possible. The area thus outlined was incised deep to adipose tissue with a #15 scalpel blade. The skin margins were undermined to an appropriate distance in all directions utilizing iris scissors. Following this, the designed flap was advanced and carried over into the primary defect and sutured into place.
Modified Advancement Flap Text: The defect edges were debeveled with a #15 scalpel blade.  Given the location of the defect, shape of the defect and the proximity to free margins a modified advancement flap was deemed most appropriate.  Using a sterile surgical marker, an appropriate advancement flap was drawn incorporating the defect and placing the expected incisions within the relaxed skin tension lines where possible.    The area thus outlined was incised deep to adipose tissue with a #15 scalpel blade.  The skin margins were undermined to an appropriate distance in all directions utilizing iris scissors.
Mucosal Advancement Flap Text: Given the location of the defect, shape of the defect and the proximity to free margins a mucosal advancement flap was deemed most appropriate. Incisions were made with a 15 blade scalpel in the appropriate fashion along the cutaneous vermilion border and the mucosal lip. The remaining actinically damaged mucosal tissue was excised.  The mucosal advancement flap was then elevated to the gingival sulcus with care taken to preserve the neurovascular structures and advanced into the primary defect. Care was taken to ensure that precise realignment of the vermilion border was achieved.
Peng Advancement Flap Text: The defect edges were debeveled with a #15 scalpel blade. Given the location of the defect, shape of the defect and the proximity to free margins a Peng advancement flap was deemed most appropriate. Using a sterile surgical marker, an appropriate advancement flap was drawn incorporating the defect and placing the expected incisions within the relaxed skin tension lines where possible. The area thus outlined was incised deep to adipose tissue with a #15 scalpel blade. The skin margins were undermined to an appropriate distance in all directions utilizing iris scissors. Following this, the designed flap was advanced and carried over into the primary defect and sutured into place.
Hatchet Flap Text: The defect edges were debeveled with a #15 scalpel blade.  Given the location of the defect, shape of the defect and the proximity to free margins a hatchet flap was deemed most appropriate.  Using a sterile surgical marker, an appropriate hatchet flap was drawn incorporating the defect and placing the expected incisions within the relaxed skin tension lines where possible.    The area thus outlined was incised deep to adipose tissue with a #15 scalpel blade.  The skin margins were undermined to an appropriate distance in all directions utilizing iris scissors.
Rotation Flap Text: The defect edges were debeveled with a #15 scalpel blade.  Given the location of the defect, shape of the defect and the proximity to free margins a rotation flap was deemed most appropriate.  Using a sterile surgical marker, an appropriate rotation flap was drawn incorporating the defect and placing the expected incisions within the relaxed skin tension lines where possible.    The area thus outlined was incised deep to adipose tissue with a #15 scalpel blade.  The skin margins were undermined to an appropriate distance in all directions utilizing iris scissors.
Bilateral Rotation Flap Text: The defect edges were debeveled with a #15 scalpel blade. Given the location of the defect, shape of the defect and the proximity to free margins a bilateral rotation flap was deemed most appropriate. Using a sterile surgical marker, an appropriate rotation flap was drawn incorporating the defect and placing the expected incisions within the relaxed skin tension lines where possible. The area thus outlined was incised deep to adipose tissue with a #15 scalpel blade. The skin margins were undermined to an appropriate distance in all directions utilizing iris scissors. Following this, the designed flap was carried over into the primary defect and sutured into place.
Spiral Flap Text: The defect edges were debeveled with a #15 scalpel blade.  Given the location of the defect, shape of the defect and the proximity to free margins a spiral flap was deemed most appropriate.  Using a sterile surgical marker, an appropriate rotation flap was drawn incorporating the defect and placing the expected incisions within the relaxed skin tension lines where possible. The area thus outlined was incised deep to adipose tissue with a #15 scalpel blade.  The skin margins were undermined to an appropriate distance in all directions utilizing iris scissors.
Staged Advancement Flap Text: The defect edges were debeveled with a #15 scalpel blade. Given the location of the defect, shape of the defect and the proximity to free margins a staged advancement flap was deemed most appropriate. Using a sterile surgical marker, an appropriate advancement flap was drawn incorporating the defect and placing the expected incisions within the relaxed skin tension lines where possible. The area thus outlined was incised deep to adipose tissue with a #15 scalpel blade. The skin margins were undermined to an appropriate distance in all directions utilizing iris scissors. Following this, the designed flap was carried over into the primary defect and sutured into place.
Star Wedge Flap Text: The defect edges were debeveled with a #15 scalpel blade.  Given the location of the defect, shape of the defect and the proximity to free margins a star wedge flap was deemed most appropriate.  Using a sterile surgical marker, an appropriate rotation flap was drawn incorporating the defect and placing the expected incisions within the relaxed skin tension lines where possible. The area thus outlined was incised deep to adipose tissue with a #15 scalpel blade.  The skin margins were undermined to an appropriate distance in all directions utilizing iris scissors.
Transposition Flap Text: The defect edges were debeveled with a #15 scalpel blade.  Given the location of the defect and the proximity to free margins a transposition flap was deemed most appropriate.  Using a sterile surgical marker, an appropriate transposition flap was drawn incorporating the defect.    The area thus outlined was incised deep to adipose tissue with a #15 scalpel blade.  The skin margins were undermined to an appropriate distance in all directions utilizing iris scissors.
Muscle Hinge Flap Text: The defect edges were debeveled with a #15 scalpel blade.  Given the size, depth and location of the defect and the proximity to free margins a muscle hinge flap was deemed most appropriate.  Using a sterile surgical marker, an appropriate hinge flap was drawn incorporating the defect. The area thus outlined was incised with a #15 scalpel blade.  The skin margins were undermined to an appropriate distance in all directions utilizing iris scissors.
Mustarde Flap Text: The defect edges were debeveled with a #15 scalpel blade.  Given the size, depth and location of the defect and the proximity to free margins a Mustarde flap was deemed most appropriate. Using a sterile surgical marker, an appropriate flap was drawn incorporating the defect. The area thus outlined was incised with a #15 scalpel blade. The skin margins were undermined to an appropriate distance in all directions utilizing iris scissors. Following this, the designed flap was carried into the primary defect and sutured into place.
Nasal Turnover Hinge Flap Text: The defect edges were debeveled with a #15 scalpel blade.  Given the size, depth, location of the defect and the defect being full thickness a nasal turnover hinge flap was deemed most appropriate. Using a sterile surgical marker, an appropriate hinge flap was drawn incorporating the defect. The area thus outlined was incised with a #15 scalpel blade. The flap was designed to recreate the nasal mucosal lining and the alar rim. The skin margins were undermined to an appropriate distance in all directions utilizing iris scissors. Following this, the designed flap was carried over into the primary defect and sutured into place
Nasalis-Muscle-Based Myocutaneous Island Pedicle Flap Text: Using a #15 blade, an incision was made around the donor flap to the level of the nasalis muscle. Wide lateral undermining was then performed in both the subcutaneous plane above the nasalis muscle, and in a submuscular plane just above periosteum. This allowed the formation of a free nasalis muscle axial pedicle (based on the angular artery) which was still attached to the actual cutaneous flap, increasing its mobility and vascular viability. Hemostasis was obtained with pinpoint electrocoagulation. The flap was mobilized into position and the pivotal anchor points positioned and stabilized with buried interrupted sutures. Subcutaneous and dermal tissues were closed in a multilayered fashion with sutures. Tissue redundancies were excised, and the epidermal edges were apposed without significant tension and sutured with sutures.
Nasalis Myocutaneous Flap Text: Using a #15 blade, an incision was made around the donor flap to the level of the nasalis muscle. Wide lateral undermining was then performed in both the subcutaneous plane above the nasalis muscle, and in a submuscular plane just above periosteum. This allowed the formation of a free nasalis muscle axial pedicle which was still attached to the actual cutaneous flap, increasing its mobility and vascular viability. Hemostasis was obtained with pinpoint electrocoagulation. The flap was mobilized into position and the pivotal anchor points positioned and stabilized with buried interrupted sutures. Subcutaneous and dermal tissues were closed in a multilayered fashion with sutures. Tissue redundancies were excised, and the epidermal edges were apposed without significant tension and sutured with sutures.
Nasolabial Transposition Flap Text: The defect edges were debeveled with a #15 scalpel blade.  Given the size, depth and location of the defect and the proximity to free margins a nasolabial transposition flap was deemed most appropriate. Using a sterile surgical marker, an appropriate flap was drawn incorporating the defect. The area thus outlined was incised with a #15 scalpel blade. The skin margins were undermined to an appropriate distance in all directions utilizing iris scissors. Following this, the designed flap was carried into the primary defect and sutured into place.
Orbicularis Oris Muscle Flap Text: The defect edges were debeveled with a #15 scalpel blade.  Given that the defect affected the competency of the oral sphincter an orbicularis oris muscle flap was deemed most appropriate to restore this competency and normal muscle function.  Using a sterile surgical marker, an appropriate flap was drawn incorporating the defect. The area thus outlined was incised with a #15 scalpel blade. Following this, the designed flap was carried over into the primary defect and sutured into place.
Melolabial Transposition Flap Text: The defect edges were debeveled with a #15 scalpel blade.  Given the location of the defect and the proximity to free margins a melolabial flap was deemed most appropriate.  Using a sterile surgical marker, an appropriate melolabial transposition flap was drawn incorporating the defect.    The area thus outlined was incised deep to adipose tissue with a #15 scalpel blade.  The skin margins were undermined to an appropriate distance in all directions utilizing iris scissors.
Rectangular Flap Text: The defect edges were debeveled with a #15 scalpel blade. Given the location of the defect and the proximity to free margins a rectangular flap was deemed most appropriate. Using a sterile surgical marker, an appropriate rectangular flap was drawn incorporating the defect. The area thus outlined was incised deep to adipose tissue with a #15 scalpel blade. The skin margins were undermined to an appropriate distance in all directions utilizing iris scissors. Following this, the designed flap was carried over into the primary defect and sutured into place.
Rhombic Flap Text: The defect edges were debeveled with a #15 scalpel blade.  Given the location of the defect and the proximity to free margins a rhombic flap was deemed most appropriate.  Using a sterile surgical marker, an appropriate rhombic flap was drawn incorporating the defect.    The area thus outlined was incised deep to adipose tissue with a #15 scalpel blade.  The skin margins were undermined to an appropriate distance in all directions utilizing iris scissors.
Rhomboid Transposition Flap Text: The defect edges were debeveled with a #15 scalpel blade. Given the location of the defect and the proximity to free margins a rhomboid transposition flap was deemed most appropriate. Using a sterile surgical marker, an appropriate rhomboid flap was drawn incorporating the defect. The area thus outlined was incised deep to adipose tissue with a #15 scalpel blade. The skin margins were undermined to an appropriate distance in all directions utilizing iris scissors. Following this, the designed flap was carried over into the primary defect and sutured into place.
Bi-Rhombic Flap Text: The defect edges were debeveled with a #15 scalpel blade.  Given the location of the defect and the proximity to free margins a bi-rhombic flap was deemed most appropriate.  Using a sterile surgical marker, an appropriate rhombic flap was drawn incorporating the defect. The area thus outlined was incised deep to adipose tissue with a #15 scalpel blade.  The skin margins were undermined to an appropriate distance in all directions utilizing iris scissors.
Helical Rim Advancement Flap Text: The defect edges were debeveled with a #15 blade scalpel.  Given the location of the defect and the proximity to free margins (helical rim) a double helical rim advancement flap was deemed most appropriate.  Using a sterile surgical marker, the appropriate advancement flaps were drawn incorporating the defect and placing the expected incisions between the helical rim and antihelix where possible.  The area thus outlined was incised through and through with a #15 scalpel blade.  With a skin hook and iris scissors, the flaps were gently and sharply undermined and freed up.
Bilateral Helical Rim Advancement Flap Text: The defect edges were debeveled with a #15 blade scalpel.  Given the location of the defect and the proximity to free margins (helical rim) a bilateral helical rim advancement flap was deemed most appropriate.  Using a sterile surgical marker, the appropriate advancement flaps were drawn incorporating the defect and placing the expected incisions between the helical rim and antihelix where possible.  The area thus outlined was incised through and through with a #15 scalpel blade.  With a skin hook and iris scissors, the flaps were gently and sharply undermined and freed up.
Ear Star Wedge Flap Text: The defect edges were debeveled with a #15 blade scalpel.  Given the location of the defect and the proximity to free margins (helical rim) an ear star wedge flap was deemed most appropriate.  Using a sterile surgical marker, the appropriate flap was drawn incorporating the defect and placing the expected incisions between the helical rim and antihelix where possible.  The area thus outlined was incised through and through with a #15 scalpel blade.
Flip-Flop Flap Text: The defect edges were debeveled with a #15 blade scalpel.  Given the location of the defect and the proximity to free margins a flip-flop flap was deemed most appropriate. Using a sterile surgical marker, the appropriate flap was drawn incorporating the defect and placing the expected incisions between the helical rim and antihelix where possible.  The area thus outlined was incised through and through with a #15 scalpel blade. Following this, the designed flap was carried over into the primary defect and sutured into place.
Banner Transposition Flap Text: The defect edges were debeveled with a #15 scalpel blade. Given the location of the defect and the proximity to free margins a Banner transposition flap was deemed most appropriate. Using a sterile surgical marker, an appropriate flap was drawn around the defect. The area thus outlined was incised deep to adipose tissue with a #15 scalpel blade. The skin margins were undermined to an appropriate distance in all directions utilizing iris scissors. Following this, the designed flap was carried into the primary defect and sutured into place.
Bilobed Flap Text: The defect edges were debeveled with a #15 scalpel blade.  Given the location of the defect and the proximity to free margins a bilobe flap was deemed most appropriate.  Using a sterile surgical marker, an appropriate bilobe flap drawn around the defect.    The area thus outlined was incised deep to adipose tissue with a #15 scalpel blade.  The skin margins were undermined to an appropriate distance in all directions utilizing iris scissors.
Bilobed Transposition Flap Text: The defect edges were debeveled with a #15 scalpel blade.  Given the location of the defect and the proximity to free margins a bilobed transposition flap was deemed most appropriate.  Using a sterile surgical marker, an appropriate bilobe flap drawn around the defect.    The area thus outlined was incised deep to adipose tissue with a #15 scalpel blade.  The skin margins were undermined to an appropriate distance in all directions utilizing iris scissors.
Trilobed Flap Text: The defect edges were debeveled with a #15 scalpel blade.  Given the location of the defect and the proximity to free margins a trilobed flap was deemed most appropriate.  Using a sterile surgical marker, an appropriate trilobed flap drawn around the defect.    The area thus outlined was incised deep to adipose tissue with a #15 scalpel blade.  The skin margins were undermined to an appropriate distance in all directions utilizing iris scissors.
Dorsal Nasal Flap Text: The defect edges were debeveled with a #15 scalpel blade.  Given the location of the defect and the proximity to free margins a dorsal nasal flap was deemed most appropriate.  Using a sterile surgical marker, an appropriate dorsal nasal flap was drawn around the defect.    The area thus outlined was incised deep to adipose tissue with a #15 scalpel blade.  The skin margins were undermined to an appropriate distance in all directions utilizing iris scissors.
Island Pedicle Flap Text: The defect edges were debeveled with a #15 scalpel blade.  Given the location of the defect, shape of the defect and the proximity to free margins an island pedicle advancement flap was deemed most appropriate.  Using a sterile surgical marker, an appropriate advancement flap was drawn incorporating the defect, outlining the appropriate donor tissue and placing the expected incisions within the relaxed skin tension lines where possible.    The area thus outlined was incised deep to adipose tissue with a #15 scalpel blade.  The skin margins were undermined to an appropriate distance in all directions around the primary defect and laterally outward around the island pedicle utilizing iris scissors.  There was minimal undermining beneath the pedicle flap.
Island Pedicle Flap With Canthal Suspension Text: The defect edges were debeveled with a #15 scalpel blade.  Given the location of the defect, shape of the defect and the proximity to free margins an island pedicle advancement flap was deemed most appropriate.  Using a sterile surgical marker, an appropriate advancement flap was drawn incorporating the defect, outlining the appropriate donor tissue and placing the expected incisions within the relaxed skin tension lines where possible. The area thus outlined was incised deep to adipose tissue with a #15 scalpel blade.  The skin margins were undermined to an appropriate distance in all directions around the primary defect and laterally outward around the island pedicle utilizing iris scissors.  There was minimal undermining beneath the pedicle flap. A suspension suture was placed in the canthal tendon to prevent tension and prevent ectropion.
Alar Island Pedicle Flap Text: The defect edges were debeveled with a #15 scalpel blade.  Given the location of the defect, shape of the defect and the proximity to the alar rim an island pedicle advancement flap was deemed most appropriate.  Using a sterile surgical marker, an appropriate advancement flap was drawn incorporating the defect, outlining the appropriate donor tissue and placing the expected incisions within the nasal ala running parallel to the alar rim. The area thus outlined was incised with a #15 scalpel blade.  The skin margins were undermined minimally to an appropriate distance in all directions around the primary defect and laterally outward around the island pedicle utilizing iris scissors.  There was minimal undermining beneath the pedicle flap.
Double Island Pedicle Flap Text: The defect edges were debeveled with a #15 scalpel blade.  Given the location of the defect, shape of the defect and the proximity to free margins a double island pedicle advancement flap was deemed most appropriate.  Using a sterile surgical marker, an appropriate advancement flap was drawn incorporating the defect, outlining the appropriate donor tissue and placing the expected incisions within the relaxed skin tension lines where possible.    The area thus outlined was incised deep to adipose tissue with a #15 scalpel blade.  The skin margins were undermined to an appropriate distance in all directions around the primary defect and laterally outward around the island pedicle utilizing iris scissors.  There was minimal undermining beneath the pedicle flap.
Island Pedicle Flap-Requiring Vessel Identification Text: The defect edges were debeveled with a #15 scalpel blade.  Given the location of the defect, shape of the defect and the proximity to free margins an island pedicle advancement flap was deemed most appropriate.  Using a sterile surgical marker, an appropriate advancement flap was drawn, based on the axial vessel mentioned above, incorporating the defect, outlining the appropriate donor tissue and placing the expected incisions within the relaxed skin tension lines where possible.    The area thus outlined was incised deep to adipose tissue with a #15 scalpel blade.  The skin margins were undermined to an appropriate distance in all directions around the primary defect and laterally outward around the island pedicle utilizing iris scissors.  There was minimal undermining beneath the pedicle flap.
Keystone Flap Text: The defect edges were debeveled with a #15 scalpel blade.  Given the location of the defect, shape of the defect a keystone flap was deemed most appropriate.  Using a sterile surgical marker, an appropriate keystone flap was drawn incorporating the defect, outlining the appropriate donor tissue and placing the expected incisions within the relaxed skin tension lines where possible. The area thus outlined was incised deep to adipose tissue with a #15 scalpel blade.  The skin margins were undermined to an appropriate distance in all directions around the primary defect and laterally outward around the flap utilizing iris scissors.
O-T Plasty Text: The defect edges were debeveled with a #15 scalpel blade.  Given the location of the defect, shape of the defect and the proximity to free margins an O-T plasty was deemed most appropriate.  Using a sterile surgical marker, an appropriate O-T plasty was drawn incorporating the defect and placing the expected incisions within the relaxed skin tension lines where possible.    The area thus outlined was incised deep to adipose tissue with a #15 scalpel blade.  The skin margins were undermined to an appropriate distance in all directions utilizing iris scissors.
O-Z Plasty Text: The defect edges were debeveled with a #15 scalpel blade.  Given the location of the defect, shape of the defect and the proximity to free margins an O-Z plasty (double transposition flap) was deemed most appropriate.  Using a sterile surgical marker, the appropriate transposition flaps were drawn incorporating the defect and placing the expected incisions within the relaxed skin tension lines where possible.    The area thus outlined was incised deep to adipose tissue with a #15 scalpel blade.  The skin margins were undermined to an appropriate distance in all directions utilizing iris scissors.  Hemostasis was achieved with electrocautery.  The flaps were then transposed into place, one clockwise and the other counterclockwise, and anchored with interrupted buried subcutaneous sutures.
Double O-Z Plasty Text: The defect edges were debeveled with a #15 scalpel blade. Given the location of the defect, shape of the defect and the proximity to free margins a Double O-Z plasty (double transposition flap) was deemed most appropriate. Using a sterile surgical marker, the appropriate transposition flaps were drawn incorporating the defect and placing the expected incisions within the relaxed skin tension lines where possible. The area thus outlined was incised deep to adipose tissue with a #15 scalpel blade. The skin margins were undermined to an appropriate distance in all directions utilizing iris scissors. Hemostasis was achieved with electrocautery. The flaps were then transposed and carried over into place, one clockwise and the other counterclockwise, and anchored with interrupted buried subcutaneous sutures.
V-Y Plasty Text: The defect edges were debeveled with a #15 scalpel blade.  Given the location of the defect, shape of the defect and the proximity to free margins an V-Y advancement flap was deemed most appropriate.  Using a sterile surgical marker, an appropriate advancement flap was drawn incorporating the defect and placing the expected incisions within the relaxed skin tension lines where possible.    The area thus outlined was incised deep to adipose tissue with a #15 scalpel blade.  The skin margins were undermined to an appropriate distance in all directions utilizing iris scissors.
H Plasty Text: Given the location of the defect, shape of the defect and the proximity to free margins a H-plasty was deemed most appropriate for repair.  Using a sterile surgical marker, the appropriate advancement arms of the H-plasty were drawn incorporating the defect and placing the expected incisions within the relaxed skin tension lines where possible. The area thus outlined was incised deep to adipose tissue with a #15 scalpel blade. The skin margins were undermined to an appropriate distance in all directions utilizing iris scissors.  The opposing advancement arms were then advanced into place in opposite direction and anchored with interrupted buried subcutaneous sutures.
W Plasty Text: The lesion was extirpated to the level of the fat with a #15 scalpel blade.  Given the location of the defect, shape of the defect and the proximity to free margins a W-plasty was deemed most appropriate for repair.  Using a sterile surgical marker, the appropriate transposition arms of the W-plasty were drawn incorporating the defect and placing the expected incisions within the relaxed skin tension lines where possible.    The area thus outlined was incised deep to adipose tissue with a #15 scalpel blade.  The skin margins were undermined to an appropriate distance in all directions utilizing iris scissors.  The opposing transposition arms were then transposed into place in opposite direction and anchored with interrupted buried subcutaneous sutures.
Z Plasty Text: The lesion was extirpated to the level of the fat with a #15 scalpel blade.  Given the location of the defect, shape of the defect and the proximity to free margins a Z-plasty was deemed most appropriate for repair.  Using a sterile surgical marker, the appropriate transposition arms of the Z-plasty were drawn incorporating the defect and placing the expected incisions within the relaxed skin tension lines where possible.    The area thus outlined was incised deep to adipose tissue with a #15 scalpel blade.  The skin margins were undermined to an appropriate distance in all directions utilizing iris scissors.  The opposing transposition arms were then transposed into place in opposite direction and anchored with interrupted buried subcutaneous sutures.
Double Z Plasty Text: The lesion was extirpated to the level of the fat with a #15 scalpel blade. Given the location of the defect, shape of the defect and the proximity to free margins a double Z-plasty was deemed most appropriate for repair. Using a sterile surgical marker, the appropriate transposition arms of the double Z-plasty were drawn incorporating the defect and placing the expected incisions within the relaxed skin tension lines where possible. The area thus outlined was incised deep to adipose tissue with a #15 scalpel blade. The skin margins were undermined to an appropriate distance in all directions utilizing iris scissors. The opposing transposition arms were then transposed and carried over into place in opposite direction and anchored with interrupted buried subcutaneous sutures.
Zygomaticofacial Flap Text: Given the location of the defect, shape of the defect and the proximity to free margins a zygomaticofacial flap was deemed most appropriate for repair. Using a sterile surgical marker, the appropriate flap was drawn incorporating the defect and placing the expected incisions within the relaxed skin tension lines where possible. The area thus outlined was incised deep to adipose tissue with a #15 scalpel blade with preservation of a vascular pedicle.  The skin margins were undermined to an appropriate distance in all directions utilizing iris scissors. The flap was then carried over into the defect and anchored with interrupted buried subcutaneous sutures.
Cheek Interpolation Flap Text: A decision was made to reconstruct the defect utilizing an interpolation axial flap and a staged reconstruction.  A telfa template was made of the defect.  This telfa template was then used to outline the Cheek Interpolation flap.  The donor area for the pedicle flap was then injected with anesthesia.  The flap was excised through the skin and subcutaneous tissue down to the layer of the underlying musculature.  The interpolation flap was carefully excised within this deep plane to maintain its blood supply.  The edges of the donor site were undermined.   The donor site was closed in a primary fashion.  The pedicle was then rotated into position and sutured.  Once the tube was sutured into place, adequate blood supply was confirmed with blanching and refill.  The pedicle was then wrapped with xeroform gauze and dressed appropriately with a telfa and gauze bandage to ensure continued blood supply and protect the attached pedicle.
Cheek-To-Nose Interpolation Flap Text: A decision was made to reconstruct the defect utilizing an interpolation axial flap and a staged reconstruction.  A telfa template was made of the defect.  This telfa template was then used to outline the Cheek-To-Nose Interpolation flap.  The donor area for the pedicle flap was then injected with anesthesia.  The flap was excised through the skin and subcutaneous tissue down to the layer of the underlying musculature.  The interpolation flap was carefully excised within this deep plane to maintain its blood supply.  The edges of the donor site were undermined.   The donor site was closed in a primary fashion.  The pedicle was then rotated into position and sutured.  Once the tube was sutured into place, adequate blood supply was confirmed with blanching and refill.  The pedicle was then wrapped with xeroform gauze and dressed appropriately with a telfa and gauze bandage to ensure continued blood supply and protect the attached pedicle.
Interpolation Flap Text: A decision was made to reconstruct the defect utilizing an interpolation axial flap and a staged reconstruction.  A telfa template was made of the defect.  This telfa template was then used to outline the interpolation flap.  The donor area for the pedicle flap was then injected with anesthesia.  The flap was excised through the skin and subcutaneous tissue down to the layer of the underlying musculature.  The interpolation flap was carefully excised within this deep plane to maintain its blood supply.  The edges of the donor site were undermined.   The donor site was closed in a primary fashion.  The pedicle was then rotated into position and sutured.  Once the tube was sutured into place, adequate blood supply was confirmed with blanching and refill.  The pedicle was then wrapped with xeroform gauze and dressed appropriately with a telfa and gauze bandage to ensure continued blood supply and protect the attached pedicle.
Melolabial Interpolation Flap Text: A decision was made to reconstruct the defect utilizing an interpolation axial flap and a staged reconstruction.  A telfa template was made of the defect.  This telfa template was then used to outline the melolabial interpolation flap.  The donor area for the pedicle flap was then injected with anesthesia.  The flap was excised through the skin and subcutaneous tissue down to the layer of the underlying musculature.  The pedicle flap was carefully excised within this deep plane to maintain its blood supply.  The edges of the donor site were undermined.   The donor site was closed in a primary fashion.  The pedicle was then rotated into position and sutured.  Once the tube was sutured into place, adequate blood supply was confirmed with blanching and refill.  The pedicle was then wrapped with xeroform gauze and dressed appropriately with a telfa and gauze bandage to ensure continued blood supply and protect the attached pedicle.
Mastoid Interpolation Flap Text: A decision was made to reconstruct the defect utilizing an interpolation axial flap and a staged reconstruction.  A telfa template was made of the defect.  This telfa template was then used to outline the mastoid interpolation flap.  The donor area for the pedicle flap was then injected with anesthesia.  The flap was excised through the skin and subcutaneous tissue down to the layer of the underlying musculature.  The pedicle flap was carefully excised within this deep plane to maintain its blood supply.  The edges of the donor site were undermined.   The donor site was closed in a primary fashion.  The pedicle was then rotated into position and sutured.  Once the tube was sutured into place, adequate blood supply was confirmed with blanching and refill.  The pedicle was then wrapped with xeroform gauze and dressed appropriately with a telfa and gauze bandage to ensure continued blood supply and protect the attached pedicle.
Posterior Auricular Interpolation Flap Text: A decision was made to reconstruct the defect utilizing an interpolation axial flap and a staged reconstruction.  A telfa template was made of the defect.  This telfa template was then used to outline the posterior auricular interpolation flap.  The donor area for the pedicle flap was then injected with anesthesia.  The flap was excised through the skin and subcutaneous tissue down to the layer of the underlying musculature.  The pedicle flap was carefully excised within this deep plane to maintain its blood supply.  The edges of the donor site were undermined.   The donor site was closed in a primary fashion.  The pedicle was then rotated into position and sutured.  Once the tube was sutured into place, adequate blood supply was confirmed with blanching and refill.  The pedicle was then wrapped with xeroform gauze and dressed appropriately with a telfa and gauze bandage to ensure continued blood supply and protect the attached pedicle.
Paramedian Forehead Flap Text: A decision was made to reconstruct the defect utilizing an interpolation axial flap and a staged reconstruction.  A telfa template was made of the defect.  This telfa template was then used to outline the paramedian forehead pedicle flap.  The donor area for the pedicle flap was then injected with anesthesia.  The flap was excised through the skin and subcutaneous tissue down to the layer of the underlying musculature.  The pedicle flap was carefully excised within this deep plane to maintain its blood supply.  The edges of the donor site were undermined.   The donor site was closed in a primary fashion.  The pedicle was then rotated into position and sutured.  Once the tube was sutured into place, adequate blood supply was confirmed with blanching and refill.  The pedicle was then wrapped with xeroform gauze and dressed appropriately with a telfa and gauze bandage to ensure continued blood supply and protect the attached pedicle.
Abbe Flap (Upper To Lower Lip) Text: The defect of the lower lip was assessed and measured.  Given the location and size of the defect, an Abbe flap was deemed most appropriate. Using a sterile surgical marker, an appropriate Abbe flap was measured and drawn on the upper lip. Local anesthesia was then infiltrated.  A scalpel was then used to incise the upper lip through and through the skin, vermilion, muscle and mucosa, leaving the flap pedicled on the opposite side.  The flap was then rotated and transferred to the lower lip defect.  The flap was then sutured into place with a three layer technique, closing the orbicularis oris muscle layer with subcutaneous buried sutures, followed by a mucosal layer and an epidermal layer.
Abbe Flap (Lower To Upper Lip) Text: The defect of the upper lip was assessed and measured.  Given the location and size of the defect, an Abbe flap was deemed most appropriate. Using a sterile surgical marker, an appropriate Abbe flap was measured and drawn on the lower lip. Local anesthesia was then infiltrated. A scalpel was then used to incise the upper lip through and through the skin, vermilion, muscle and mucosa, leaving the flap pedicled on the opposite side.  The flap was then rotated and transferred to the lower lip defect.  The flap was then sutured into place with a three layer technique, closing the orbicularis oris muscle layer with subcutaneous buried sutures, followed by a mucosal layer and an epidermal layer.
Estlander Flap (Upper To Lower Lip) Text: The defect of the lower lip was assessed and measured.  Given the location and size of the defect, an Estlander flap was deemed most appropriate. Using a sterile surgical marker, an appropriate Estlander flap was measured and drawn on the upper lip. Local anesthesia was then infiltrated. A scalpel was then used to incise the lateral aspect of the flap, through skin, muscle and mucosa, leaving the flap pedicled medially.  The flap was then rotated and positioned to fill the lower lip defect.  The flap was then sutured into place with a three layer technique, closing the orbicularis oris muscle layer with subcutaneous buried sutures, followed by a mucosal layer and an epidermal layer.
Lip Wedge Excision Repair Text: Given the location of the defect and the proximity to free margins a full thickness wedge repair was deemed most appropriate.  Using a sterile surgical marker, the appropriate repair was drawn incorporating the defect and placing the expected incisions perpendicular to the vermilion border.  The vermilion border was also meticulously outlined to ensure appropriate reapproximation during the repair.  The area thus outlined was incised through and through with a #15 scalpel blade.  The muscularis and dermis were reaproximated with deep sutures following hemostasis. Care was taken to realign the vermilion border before proceeding with the superficial closure.  Once the vermilion was realigned the superfical and mucosal closure was finished.
Ftsg Text: The defect edges were debeveled with a #15 scalpel blade.  Given the location of the defect, shape of the defect and the proximity to free margins a full thickness skin graft was deemed most appropriate.  Using a sterile surgical marker, the primary defect shape was transferred to the donor site. The area thus outlined was incised deep to adipose tissue with a #15 scalpel blade.  The harvested graft was then trimmed of adipose tissue until only dermis and epidermis was left.  The skin margins of the secondary defect were undermined to an appropriate distance in all directions utilizing iris scissors.  The secondary defect was closed with interrupted buried subcutaneous sutures.  The skin edges were then re-apposed with running  sutures.  The skin graft was then placed in the primary defect and oriented appropriately.
Split-Thickness Skin Graft Text: The defect edges were debeveled with a #15 scalpel blade.  Given the location of the defect, shape of the defect and the proximity to free margins a split thickness skin graft was deemed most appropriate.  Using a sterile surgical marker, the primary defect shape was transferred to the donor site. The split thickness graft was then harvested.  The skin graft was then placed in the primary defect and oriented appropriately.
Pinch Graft Text: The defect edges were debeveled with a #15 scalpel blade. Given the location of the defect, shape of the defect and the proximity to free margins a pinch graft was deemed most appropriate. Using a sterile surgical marker, the primary defect shape was transferred to the donor site. The area thus outlined was incised deep to adipose tissue with a #15 scalpel blade.  The harvested graft was then trimmed of adipose tissue until only dermis and epidermis was left. The skin graft was then placed in the primary defect and oriented appropriately.
Burow's Graft Text: The defect edges were debeveled with a #15 scalpel blade. Given the location of the defect, shape of the defect, the proximity to free margins and the presence of a standing cone deformity a Burow's skin graft was deemed most appropriate. The standing cone was removed and this tissue was then trimmed to the shape of the primary defect. The adipose tissue was also removed until only dermis and epidermis were left.  The skin graft was then placed in the primary defect and oriented appropriately.
Cartilage Graft Text: The defect edges were debeveled with a #15 scalpel blade.  Given the location of the defect, shape of the defect, the fact the defect involved a full thickness cartilage defect a cartilage graft was deemed most appropriate.  An appropriate donor site was identified, cleansed, and anesthetized. The cartilage graft was then harvested and transferred to the recipient site, oriented appropriately and then sutured into place.  The secondary defect was then repaired using a primary closure.
Composite Graft Text: The defect edges were debeveled with a #15 scalpel blade.  Given the location of the defect, shape of the defect, the proximity to free margins and the fact the defect was full thickness a composite graft was deemed most appropriate.  The defect was outline and then transferred to the donor site.  A full thickness graft was then excised from the donor site. The graft was then placed in the primary defect, oriented appropriately and then sutured into place.  The secondary defect was then repaired using a primary closure.
Epidermal Autograft Text: The defect edges were debeveled with a #15 scalpel blade.  Given the location of the defect, shape of the defect and the proximity to free margins an epidermal autograft was deemed most appropriate.  Using a sterile surgical marker, the primary defect shape was transferred to the donor site. The epidermal graft was then harvested.  The skin graft was then placed in the primary defect and oriented appropriately.
Dermal Autograft Text: The defect edges were debeveled with a #15 scalpel blade.  Given the location of the defect, shape of the defect and the proximity to free margins a dermal autograft was deemed most appropriate.  Using a sterile surgical marker, the primary defect shape was transferred to the donor site. The area thus outlined was incised deep to adipose tissue with a #15 scalpel blade.  The harvested graft was then trimmed of adipose and epidermal tissue until only dermis was left.  The skin graft was then placed in the primary defect and oriented appropriately.
Skin Substitute Text: The defect edges were debeveled with a #15 scalpel blade.  Given the location of the defect, shape of the defect and the proximity to free margins a skin substitute graft was deemed most appropriate.  The graft material was trimmed to fit the size of the defect. The graft was then placed in the primary defect and oriented appropriately.
Tissue Cultured Epidermal Autograft Text: The defect edges were debeveled with a #15 scalpel blade.  Given the location of the defect, shape of the defect and the proximity to free margins a tissue cultured epidermal autograft was deemed most appropriate.  The graft was then trimmed to fit the size of the defect.  The graft was then placed in the primary defect and oriented appropriately.
Xenograft Text: The defect edges were debeveled with a #15 scalpel blade.  Given the location of the defect, shape of the defect and the proximity to free margins a xenograft was deemed most appropriate.  The graft was then trimmed to fit the size of the defect.  The graft was then placed in the primary defect and oriented appropriately.
Purse String (Intermediate) Text: Given the location of the defect and the characteristics of the surrounding skin a purse string intermediate closure was deemed most appropriate.  Undermining was performed circumfirentially around the surgical defect.  A purse string suture was then placed and tightened.
Purse String (Simple) Text: Given the location of the defect and the characteristics of the surrounding skin a purse string simple closure was deemed most appropriate.  Undermining was performed circumferentially around the surgical defect.  A purse string suture was then placed and tightened.
Partial Purse String (Intermediate) Text: Given the location of the defect and the characteristics of the surrounding skin an intermediate purse string closure was deemed most appropriate.  Undermining was performed circumferentially around the surgical defect.  A purse string suture was then placed and tightened. Wound tension of the circular defect prevented complete closure of the wound.
Partial Purse String (Simple) Text: Given the location of the defect and the characteristics of the surrounding skin a simple purse string closure was deemed most appropriate.  Undermining was performed circumferentially around the surgical defect.  A purse string suture was then placed and tightened. Wound tension of the circular defect prevented complete closure of the wound.
Complex Repair And Single Advancement Flap Text: The defect edges were debeveled with a #15 scalpel blade.  The primary defect was closed partially with a complex linear closure.  Given the location of the remaining defect, shape of the defect and the proximity to free margins a single advancement flap was deemed most appropriate for complete closure of the defect.  Using a sterile surgical marker, an appropriate advancement flap was drawn incorporating the defect and placing the expected incisions within the relaxed skin tension lines where possible.    The area thus outlined was incised deep to adipose tissue with a #15 scalpel blade.  The skin margins were undermined to an appropriate distance in all directions utilizing iris scissors.
Complex Repair And Double Advancement Flap Text: The defect edges were debeveled with a #15 scalpel blade.  The primary defect was closed partially with a complex linear closure.  Given the location of the remaining defect, shape of the defect and the proximity to free margins a double advancement flap was deemed most appropriate for complete closure of the defect.  Using a sterile surgical marker, an appropriate advancement flap was drawn incorporating the defect and placing the expected incisions within the relaxed skin tension lines where possible.    The area thus outlined was incised deep to adipose tissue with a #15 scalpel blade.  The skin margins were undermined to an appropriate distance in all directions utilizing iris scissors.
Complex Repair And Modified Advancement Flap Text: The defect edges were debeveled with a #15 scalpel blade.  The primary defect was closed partially with a complex linear closure.  Given the location of the remaining defect, shape of the defect and the proximity to free margins a modified advancement flap was deemed most appropriate for complete closure of the defect.  Using a sterile surgical marker, an appropriate advancement flap was drawn incorporating the defect and placing the expected incisions within the relaxed skin tension lines where possible.    The area thus outlined was incised deep to adipose tissue with a #15 scalpel blade.  The skin margins were undermined to an appropriate distance in all directions utilizing iris scissors.
Complex Repair And A-T Advancement Flap Text: The defect edges were debeveled with a #15 scalpel blade.  The primary defect was closed partially with a complex linear closure.  Given the location of the remaining defect, shape of the defect and the proximity to free margins an A-T advancement flap was deemed most appropriate for complete closure of the defect.  Using a sterile surgical marker, an appropriate advancement flap was drawn incorporating the defect and placing the expected incisions within the relaxed skin tension lines where possible.    The area thus outlined was incised deep to adipose tissue with a #15 scalpel blade.  The skin margins were undermined to an appropriate distance in all directions utilizing iris scissors.
Complex Repair And O-T Advancement Flap Text: The defect edges were debeveled with a #15 scalpel blade.  The primary defect was closed partially with a complex linear closure.  Given the location of the remaining defect, shape of the defect and the proximity to free margins an O-T advancement flap was deemed most appropriate for complete closure of the defect.  Using a sterile surgical marker, an appropriate advancement flap was drawn incorporating the defect and placing the expected incisions within the relaxed skin tension lines where possible.    The area thus outlined was incised deep to adipose tissue with a #15 scalpel blade.  The skin margins were undermined to an appropriate distance in all directions utilizing iris scissors.
Complex Repair And O-L Flap Text: The defect edges were debeveled with a #15 scalpel blade.  The primary defect was closed partially with a complex linear closure.  Given the location of the remaining defect, shape of the defect and the proximity to free margins an O-L flap was deemed most appropriate for complete closure of the defect.  Using a sterile surgical marker, an appropriate flap was drawn incorporating the defect and placing the expected incisions within the relaxed skin tension lines where possible.    The area thus outlined was incised deep to adipose tissue with a #15 scalpel blade.  The skin margins were undermined to an appropriate distance in all directions utilizing iris scissors.
Complex Repair And Bilobe Flap Text: The defect edges were debeveled with a #15 scalpel blade.  The primary defect was closed partially with a complex linear closure.  Given the location of the remaining defect, shape of the defect and the proximity to free margins a bilobe flap was deemed most appropriate for complete closure of the defect.  Using a sterile surgical marker, an appropriate advancement flap was drawn incorporating the defect and placing the expected incisions within the relaxed skin tension lines where possible.    The area thus outlined was incised deep to adipose tissue with a #15 scalpel blade.  The skin margins were undermined to an appropriate distance in all directions utilizing iris scissors.
Complex Repair And Melolabial Flap Text: The defect edges were debeveled with a #15 scalpel blade.  The primary defect was closed partially with a complex linear closure.  Given the location of the remaining defect, shape of the defect and the proximity to free margins a melolabial flap was deemed most appropriate for complete closure of the defect.  Using a sterile surgical marker, an appropriate advancement flap was drawn incorporating the defect and placing the expected incisions within the relaxed skin tension lines where possible.    The area thus outlined was incised deep to adipose tissue with a #15 scalpel blade.  The skin margins were undermined to an appropriate distance in all directions utilizing iris scissors.
Complex Repair And Rotation Flap Text: The defect edges were debeveled with a #15 scalpel blade.  The primary defect was closed partially with a complex linear closure.  Given the location of the remaining defect, shape of the defect and the proximity to free margins a rotation flap was deemed most appropriate for complete closure of the defect.  Using a sterile surgical marker, an appropriate advancement flap was drawn incorporating the defect and placing the expected incisions within the relaxed skin tension lines where possible.    The area thus outlined was incised deep to adipose tissue with a #15 scalpel blade.  The skin margins were undermined to an appropriate distance in all directions utilizing iris scissors.
Complex Repair And Rhombic Flap Text: The defect edges were debeveled with a #15 scalpel blade.  The primary defect was closed partially with a complex linear closure.  Given the location of the remaining defect, shape of the defect and the proximity to free margins a rhombic flap was deemed most appropriate for complete closure of the defect.  Using a sterile surgical marker, an appropriate advancement flap was drawn incorporating the defect and placing the expected incisions within the relaxed skin tension lines where possible.    The area thus outlined was incised deep to adipose tissue with a #15 scalpel blade.  The skin margins were undermined to an appropriate distance in all directions utilizing iris scissors.
Complex Repair And Transposition Flap Text: The defect edges were debeveled with a #15 scalpel blade.  The primary defect was closed partially with a complex linear closure.  Given the location of the remaining defect, shape of the defect and the proximity to free margins a transposition flap was deemed most appropriate for complete closure of the defect.  Using a sterile surgical marker, an appropriate advancement flap was drawn incorporating the defect and placing the expected incisions within the relaxed skin tension lines where possible.    The area thus outlined was incised deep to adipose tissue with a #15 scalpel blade.  The skin margins were undermined to an appropriate distance in all directions utilizing iris scissors.
Complex Repair And V-Y Plasty Text: The defect edges were debeveled with a #15 scalpel blade.  The primary defect was closed partially with a complex linear closure.  Given the location of the remaining defect, shape of the defect and the proximity to free margins a V-Y plasty was deemed most appropriate for complete closure of the defect.  Using a sterile surgical marker, an appropriate advancement flap was drawn incorporating the defect and placing the expected incisions within the relaxed skin tension lines where possible.    The area thus outlined was incised deep to adipose tissue with a #15 scalpel blade.  The skin margins were undermined to an appropriate distance in all directions utilizing iris scissors.
Complex Repair And M Plasty Text: The defect edges were debeveled with a #15 scalpel blade.  The primary defect was closed partially with a complex linear closure.  Given the location of the remaining defect, shape of the defect and the proximity to free margins an M plasty was deemed most appropriate for complete closure of the defect.  Using a sterile surgical marker, an appropriate advancement flap was drawn incorporating the defect and placing the expected incisions within the relaxed skin tension lines where possible.    The area thus outlined was incised deep to adipose tissue with a #15 scalpel blade.  The skin margins were undermined to an appropriate distance in all directions utilizing iris scissors.
Complex Repair And Double M Plasty Text: The defect edges were debeveled with a #15 scalpel blade.  The primary defect was closed partially with a complex linear closure.  Given the location of the remaining defect, shape of the defect and the proximity to free margins a double M plasty was deemed most appropriate for complete closure of the defect.  Using a sterile surgical marker, an appropriate advancement flap was drawn incorporating the defect and placing the expected incisions within the relaxed skin tension lines where possible.    The area thus outlined was incised deep to adipose tissue with a #15 scalpel blade.  The skin margins were undermined to an appropriate distance in all directions utilizing iris scissors.
Complex Repair And W Plasty Text: The defect edges were debeveled with a #15 scalpel blade.  The primary defect was closed partially with a complex linear closure.  Given the location of the remaining defect, shape of the defect and the proximity to free margins a W plasty was deemed most appropriate for complete closure of the defect.  Using a sterile surgical marker, an appropriate advancement flap was drawn incorporating the defect and placing the expected incisions within the relaxed skin tension lines where possible.    The area thus outlined was incised deep to adipose tissue with a #15 scalpel blade.  The skin margins were undermined to an appropriate distance in all directions utilizing iris scissors.
Complex Repair And Z Plasty Text: The defect edges were debeveled with a #15 scalpel blade.  The primary defect was closed partially with a complex linear closure.  Given the location of the remaining defect, shape of the defect and the proximity to free margins a Z plasty was deemed most appropriate for complete closure of the defect.  Using a sterile surgical marker, an appropriate advancement flap was drawn incorporating the defect and placing the expected incisions within the relaxed skin tension lines where possible.    The area thus outlined was incised deep to adipose tissue with a #15 scalpel blade.  The skin margins were undermined to an appropriate distance in all directions utilizing iris scissors.
Complex Repair And Dorsal Nasal Flap Text: The defect edges were debeveled with a #15 scalpel blade.  The primary defect was closed partially with a complex linear closure.  Given the location of the remaining defect, shape of the defect and the proximity to free margins a dorsal nasal flap was deemed most appropriate for complete closure of the defect.  Using a sterile surgical marker, an appropriate flap was drawn incorporating the defect and placing the expected incisions within the relaxed skin tension lines where possible.    The area thus outlined was incised deep to adipose tissue with a #15 scalpel blade.  The skin margins were undermined to an appropriate distance in all directions utilizing iris scissors.
Complex Repair And Ftsg Text: The defect edges were debeveled with a #15 scalpel blade.  The primary defect was closed partially with a complex linear closure.  Given the location of the defect, shape of the defect and the proximity to free margins a full thickness skin graft was deemed most appropriate to repair the remaining defect.  The graft was trimmed to fit the size of the remaining defect.  The graft was then placed in the primary defect, oriented appropriately, and sutured into place.
Complex Repair And Burow's Graft Text: The defect edges were debeveled with a #15 scalpel blade.  The primary defect was closed partially with a complex linear closure.  Given the location of the defect, shape of the defect, the proximity to free margins and the presence of a standing cone deformity a Burow's graft was deemed most appropriate to repair the remaining defect.  The graft was trimmed to fit the size of the remaining defect.  The graft was then placed in the primary defect, oriented appropriately, and sutured into place.
Complex Repair And Split-Thickness Skin Graft Text: The defect edges were debeveled with a #15 scalpel blade.  The primary defect was closed partially with a complex linear closure.  Given the location of the defect, shape of the defect and the proximity to free margins a split thickness skin graft was deemed most appropriate to repair the remaining defect.  The graft was trimmed to fit the size of the remaining defect.  The graft was then placed in the primary defect, oriented appropriately, and sutured into place.
Complex Repair And Epidermal Autograft Text: The defect edges were debeveled with a #15 scalpel blade.  The primary defect was closed partially with a complex linear closure.  Given the location of the defect, shape of the defect and the proximity to free margins an epidermal autograft was deemed most appropriate to repair the remaining defect.  The graft was trimmed to fit the size of the remaining defect.  The graft was then placed in the primary defect, oriented appropriately, and sutured into place.
Complex Repair And Dermal Autograft Text: The defect edges were debeveled with a #15 scalpel blade.  The primary defect was closed partially with a complex linear closure.  Given the location of the defect, shape of the defect and the proximity to free margins an dermal autograft was deemed most appropriate to repair the remaining defect.  The graft was trimmed to fit the size of the remaining defect.  The graft was then placed in the primary defect, oriented appropriately, and sutured into place.
Complex Repair And Tissue Cultured Epidermal Autograft Text: The defect edges were debeveled with a #15 scalpel blade.  The primary defect was closed partially with a complex linear closure.  Given the location of the defect, shape of the defect and the proximity to free margins an tissue cultured epidermal autograft was deemed most appropriate to repair the remaining defect.  The graft was trimmed to fit the size of the remaining defect.  The graft was then placed in the primary defect, oriented appropriately, and sutured into place.
Complex Repair And Xenograft Text: The defect edges were debeveled with a #15 scalpel blade.  The primary defect was closed partially with a complex linear closure.  Given the location of the defect, shape of the defect and the proximity to free margins a xenograft was deemed most appropriate to repair the remaining defect.  The graft was trimmed to fit the size of the remaining defect.  The graft was then placed in the primary defect, oriented appropriately, and sutured into place.
Complex Repair And Skin Substitute Graft Text: The defect edges were debeveled with a #15 scalpel blade.  The primary defect was closed partially with a complex linear closure.  Given the location of the remaining defect, shape of the defect and the proximity to free margins a skin substitute graft was deemed most appropriate to repair the remaining defect.  The graft was trimmed to fit the size of the remaining defect.  The graft was then placed in the primary defect, oriented appropriately, and sutured into place.
Include Anticoagulation In Mohs Note?: Please Select the Appropriate Response
Path Notes (To The Dermatopathologist): Please check margins.
Consent was obtained from the patient. The risks and benefits to therapy were discussed in detail. Specifically, the risks of infection, scarring, bleeding, prolonged wound healing, incomplete removal, allergy to anesthesia, nerve injury and recurrence were addressed. Prior to the procedure, the treatment site was clearly identified and confirmed by the patient. All components of Universal Protocol/PAUSE Rule completed.
Post-Care Instructions: I reviewed with the patient in detail post-care instructions:\\n1. Apply bacitracin over the steri-strips.  \\n2. Cut non-stick pad (Telfa) to cover the steri-strips\\n3. Apply tape (hypafix) over the non-stick pad\\n4. Change once per day for 5 days\\n5. Shower with bandage on, change bandage after shower\\n\\nPatient is not to engage in any heavy lifting, exercise, hot tub, or swimming for the next 14 days. Should the patient develop any fevers, chills, bleeding, severe pain patient will contact the office immediately.
Home Suture Removal Text: Patient was provided a home suture removal kit and will remove their sutures at home.  If they have any questions or difficulties they will call the office.
Where Do You Want The Question To Include Opioid Counseling Located?: Case Summary Tab
Information: Selecting Yes will display possible errors in your note based on the variables you have selected. This validation is only offered as a suggestion for you. PLEASE NOTE THAT THE VALIDATION TEXT WILL BE REMOVED WHEN YOU FINALIZE YOUR NOTE. IF YOU WANT TO FAX A PRELIMINARY NOTE YOU WILL NEED TO TOGGLE THIS TO 'NO' IF YOU DO NOT WANT IT IN YOUR FAXED NOTE.

## 2025-07-22 LAB
ANNOTATION COMMENT IMP: NORMAL
HLA-DQA1 HIGH RES: NORMAL
HLA-DQA1 HIGH RES: NORMAL
HLA-DQB1 HIGH RES: NORMAL
HLA-DQB1 HIGH RES: NORMAL